# Patient Record
Sex: FEMALE | Race: AMERICAN INDIAN OR ALASKA NATIVE | NOT HISPANIC OR LATINO | Employment: FULL TIME | URBAN - METROPOLITAN AREA
[De-identification: names, ages, dates, MRNs, and addresses within clinical notes are randomized per-mention and may not be internally consistent; named-entity substitution may affect disease eponyms.]

---

## 2017-03-27 ENCOUNTER — TRANSCRIBE ORDERS (OUTPATIENT)
Dept: LAB | Facility: OTHER | Age: 62
End: 2017-03-27

## 2017-03-27 ENCOUNTER — APPOINTMENT (OUTPATIENT)
Dept: LAB | Facility: OTHER | Age: 62
End: 2017-03-27
Payer: COMMERCIAL

## 2017-03-27 DIAGNOSIS — E78.5 HYPERLIPIDEMIA, UNSPECIFIED HYPERLIPIDEMIA TYPE: Primary | ICD-10-CM

## 2017-03-27 DIAGNOSIS — E78.5 HYPERLIPIDEMIA, UNSPECIFIED HYPERLIPIDEMIA TYPE: ICD-10-CM

## 2017-03-27 DIAGNOSIS — E55.9 UNSPECIFIED VITAMIN D DEFICIENCY: ICD-10-CM

## 2017-03-27 LAB
25(OH)D3 SERPL-MCNC: 13.1 NG/ML (ref 30–100)
ALBUMIN SERPL BCP-MCNC: 3.8 G/DL (ref 3.5–5)
ALP SERPL-CCNC: 116 U/L (ref 46–116)
ALT SERPL W P-5'-P-CCNC: 17 U/L (ref 12–78)
ANION GAP SERPL CALCULATED.3IONS-SCNC: 8 MMOL/L (ref 4–13)
AST SERPL W P-5'-P-CCNC: 9 U/L (ref 5–45)
BILIRUB SERPL-MCNC: 0.84 MG/DL (ref 0.2–1)
BUN SERPL-MCNC: 9 MG/DL (ref 5–25)
CALCIUM SERPL-MCNC: 9 MG/DL (ref 8.3–10.1)
CHLORIDE SERPL-SCNC: 103 MMOL/L (ref 100–108)
CHOLEST SERPL-MCNC: 208 MG/DL (ref 50–200)
CO2 SERPL-SCNC: 31 MMOL/L (ref 21–32)
CREAT SERPL-MCNC: 0.79 MG/DL (ref 0.6–1.3)
GFR SERPL CREATININE-BSD FRML MDRD: >60 ML/MIN/1.73SQ M
GLUCOSE P FAST SERPL-MCNC: 78 MG/DL (ref 65–99)
HDLC SERPL-MCNC: 85 MG/DL (ref 40–60)
LDLC SERPL CALC-MCNC: 107 MG/DL (ref 0–100)
POTASSIUM SERPL-SCNC: 3.2 MMOL/L (ref 3.5–5.3)
PROT SERPL-MCNC: 7.7 G/DL (ref 6.4–8.2)
SODIUM SERPL-SCNC: 142 MMOL/L (ref 136–145)
TRIGL SERPL-MCNC: 79 MG/DL

## 2017-03-27 PROCEDURE — 36415 COLL VENOUS BLD VENIPUNCTURE: CPT

## 2017-03-27 PROCEDURE — 80053 COMPREHEN METABOLIC PANEL: CPT

## 2017-03-27 PROCEDURE — 80061 LIPID PANEL: CPT

## 2017-03-27 PROCEDURE — 82306 VITAMIN D 25 HYDROXY: CPT

## 2017-06-01 ENCOUNTER — HOSPITAL ENCOUNTER (EMERGENCY)
Facility: HOSPITAL | Age: 62
Discharge: HOME | End: 2017-06-01
Attending: EMERGENCY MEDICINE
Payer: COMMERCIAL

## 2017-06-01 VITALS
HEIGHT: 63 IN | RESPIRATION RATE: 17 BRPM | DIASTOLIC BLOOD PRESSURE: 79 MMHG | TEMPERATURE: 98 F | HEART RATE: 71 BPM | BODY MASS INDEX: 33.28 KG/M2 | OXYGEN SATURATION: 97 % | WEIGHT: 187.83 LBS | SYSTOLIC BLOOD PRESSURE: 163 MMHG

## 2017-06-01 DIAGNOSIS — R59.0 ANTERIOR CERVICAL ADENOPATHY: ICD-10-CM

## 2017-06-01 DIAGNOSIS — R42 VERTIGO: ICD-10-CM

## 2017-06-01 DIAGNOSIS — H92.09 EAR PAIN: Primary | ICD-10-CM

## 2017-06-01 LAB
ALBUMIN SERPL BCP-MCNC: 3.6 G/DL (ref 3.5–5)
ALP SERPL-CCNC: 132 U/L (ref 46–116)
ALT SERPL W P-5'-P-CCNC: 21 U/L (ref 12–78)
ANION GAP SERPL CALCULATED.3IONS-SCNC: 9 MMOL/L (ref 4–13)
AST SERPL W P-5'-P-CCNC: 13 U/L (ref 5–45)
BASOPHILS # BLD AUTO: 0.05 THOUSANDS/ΜL (ref 0–0.1)
BASOPHILS NFR BLD AUTO: 1 % (ref 0–1)
BILIRUB SERPL-MCNC: 0.5 MG/DL (ref 0.2–1)
BUN SERPL-MCNC: 17 MG/DL (ref 5–25)
CALCIUM SERPL-MCNC: 9.3 MG/DL (ref 8.3–10.1)
CHLORIDE SERPL-SCNC: 101 MMOL/L (ref 100–108)
CO2 SERPL-SCNC: 28 MMOL/L (ref 21–32)
CREAT SERPL-MCNC: 0.97 MG/DL (ref 0.6–1.3)
EOSINOPHIL # BLD AUTO: 0.12 THOUSAND/ΜL (ref 0–0.61)
EOSINOPHIL NFR BLD AUTO: 1 % (ref 0–6)
ERYTHROCYTE [DISTWIDTH] IN BLOOD BY AUTOMATED COUNT: 11.9 % (ref 11.6–15.1)
GFR SERPL CREATININE-BSD FRML MDRD: >60 ML/MIN/1.73SQ M
GLUCOSE SERPL-MCNC: 136 MG/DL (ref 65–140)
HCT VFR BLD AUTO: 43.9 % (ref 34.8–46.1)
HGB BLD-MCNC: 14.9 G/DL (ref 11.5–15.4)
LYMPHOCYTES # BLD AUTO: 1.72 THOUSANDS/ΜL (ref 0.6–4.47)
LYMPHOCYTES NFR BLD AUTO: 16 % (ref 14–44)
MCH RBC QN AUTO: 33 PG (ref 26.8–34.3)
MCHC RBC AUTO-ENTMCNC: 33.9 G/DL (ref 31.4–37.4)
MCV RBC AUTO: 97 FL (ref 82–98)
MONOCYTES # BLD AUTO: 0.43 THOUSAND/ΜL (ref 0.17–1.22)
MONOCYTES NFR BLD AUTO: 4 % (ref 4–12)
NEUTROPHILS # BLD AUTO: 8.72 THOUSANDS/ΜL (ref 1.85–7.62)
NEUTS SEG NFR BLD AUTO: 79 % (ref 43–75)
NRBC BLD AUTO-RTO: 0 /100 WBCS
PLATELET # BLD AUTO: 298 THOUSANDS/UL (ref 149–390)
PMV BLD AUTO: 9.9 FL (ref 8.9–12.7)
POTASSIUM SERPL-SCNC: 3.2 MMOL/L (ref 3.5–5.3)
PROT SERPL-MCNC: 7.9 G/DL (ref 6.4–8.2)
RBC # BLD AUTO: 4.52 MILLION/UL (ref 3.81–5.12)
S PYO AG THROAT QL: NEGATIVE
SODIUM SERPL-SCNC: 138 MMOL/L (ref 136–145)
WBC # BLD AUTO: 11.06 THOUSAND/UL (ref 4.31–10.16)

## 2017-06-01 PROCEDURE — 36415 COLL VENOUS BLD VENIPUNCTURE: CPT | Performed by: EMERGENCY MEDICINE

## 2017-06-01 PROCEDURE — 87070 CULTURE OTHR SPECIMN AEROBIC: CPT | Performed by: EMERGENCY MEDICINE

## 2017-06-01 PROCEDURE — 99283 EMERGENCY DEPT VISIT LOW MDM: CPT

## 2017-06-01 PROCEDURE — 96360 HYDRATION IV INFUSION INIT: CPT

## 2017-06-01 PROCEDURE — 85025 COMPLETE CBC W/AUTO DIFF WBC: CPT | Performed by: EMERGENCY MEDICINE

## 2017-06-01 PROCEDURE — 80053 COMPREHEN METABOLIC PANEL: CPT | Performed by: EMERGENCY MEDICINE

## 2017-06-01 PROCEDURE — 87430 STREP A AG IA: CPT | Performed by: EMERGENCY MEDICINE

## 2017-06-01 RX ORDER — POTASSIUM CHLORIDE 20 MEQ/1
40 TABLET, EXTENDED RELEASE ORAL ONCE
Status: COMPLETED | OUTPATIENT
Start: 2017-06-01 | End: 2017-06-01

## 2017-06-01 RX ORDER — AMOXICILLIN 500 MG/1
500 CAPSULE ORAL 3 TIMES DAILY
Qty: 30 CAPSULE | Refills: 0 | Status: SHIPPED | OUTPATIENT
Start: 2017-06-01 | End: 2017-06-11

## 2017-06-01 RX ADMIN — POTASSIUM CHLORIDE 40 MEQ: 1500 TABLET, EXTENDED RELEASE ORAL at 04:17

## 2017-06-01 RX ADMIN — SODIUM CHLORIDE 1000 ML: 0.9 INJECTION, SOLUTION INTRAVENOUS at 03:12

## 2017-06-03 LAB — BACTERIA THROAT CULT: NORMAL

## 2017-08-08 ENCOUNTER — TRANSCRIBE ORDERS (OUTPATIENT)
Dept: LAB | Facility: OTHER | Age: 62
End: 2017-08-08

## 2017-08-08 ENCOUNTER — APPOINTMENT (OUTPATIENT)
Dept: LAB | Facility: OTHER | Age: 62
End: 2017-08-08
Payer: COMMERCIAL

## 2017-08-08 DIAGNOSIS — Z00.8 HEALTH EXAMINATION IN POPULATION SURVEY: Primary | ICD-10-CM

## 2017-08-08 LAB
CHOLEST SERPL-MCNC: 190 MG/DL (ref 50–200)
EST. AVERAGE GLUCOSE BLD GHB EST-MCNC: 100 MG/DL
HBA1C MFR BLD: 5.1 % (ref 4.2–6.3)
HDLC SERPL-MCNC: 81 MG/DL (ref 40–60)
LDLC SERPL CALC-MCNC: 98 MG/DL (ref 0–100)
TRIGL SERPL-MCNC: 56 MG/DL

## 2017-08-08 PROCEDURE — 80061 LIPID PANEL: CPT

## 2017-08-08 PROCEDURE — 83036 HEMOGLOBIN GLYCOSYLATED A1C: CPT

## 2017-08-08 PROCEDURE — 36415 COLL VENOUS BLD VENIPUNCTURE: CPT

## 2017-12-27 ENCOUNTER — TRANSCRIBE ORDERS (OUTPATIENT)
Dept: LAB | Facility: OTHER | Age: 62
End: 2017-12-27

## 2017-12-27 ENCOUNTER — OFFICE VISIT (OUTPATIENT)
Dept: URGENT CARE | Facility: MEDICAL CENTER | Age: 62
End: 2017-12-27
Payer: COMMERCIAL

## 2017-12-27 ENCOUNTER — APPOINTMENT (OUTPATIENT)
Dept: LAB | Facility: OTHER | Age: 62
End: 2017-12-27
Payer: COMMERCIAL

## 2017-12-27 DIAGNOSIS — E87.6 HYPOPOTASSEMIA: Primary | ICD-10-CM

## 2017-12-27 LAB
ANION GAP SERPL CALCULATED.3IONS-SCNC: 6 MMOL/L (ref 4–13)
BUN SERPL-MCNC: 9 MG/DL (ref 5–25)
CALCIUM SERPL-MCNC: 9.2 MG/DL (ref 8.3–10.1)
CHLORIDE SERPL-SCNC: 106 MMOL/L (ref 100–108)
CO2 SERPL-SCNC: 29 MMOL/L (ref 21–32)
CREAT SERPL-MCNC: 0.8 MG/DL (ref 0.6–1.3)
GFR SERPL CREATININE-BSD FRML MDRD: 93 ML/MIN/1.73SQ M
GLUCOSE SERPL-MCNC: 78 MG/DL (ref 65–140)
POTASSIUM SERPL-SCNC: 3.3 MMOL/L (ref 3.5–5.3)
S PYO AG THROAT QL: NEGATIVE
SODIUM SERPL-SCNC: 141 MMOL/L (ref 136–145)

## 2017-12-27 PROCEDURE — 80048 BASIC METABOLIC PNL TOTAL CA: CPT

## 2017-12-27 PROCEDURE — 94640 AIRWAY INHALATION TREATMENT: CPT

## 2017-12-27 PROCEDURE — S9088 SERVICES PROVIDED IN URGENT: HCPCS

## 2017-12-27 PROCEDURE — 99213 OFFICE O/P EST LOW 20 MIN: CPT

## 2017-12-27 PROCEDURE — 36415 COLL VENOUS BLD VENIPUNCTURE: CPT

## 2018-01-01 NOTE — PROGRESS NOTES
Assessment   1  Acute bronchitis (466 0) (J20 9)   2  Laryngitis (464 00) (J04 0)    Plan   Cough    · Ipratropium-Albuterol 0 5-2 5 (3) MG/3ML Inhalation Solution   · Rapid StrepA- POC; Source:Throat; Status:Resulted - Requires Verification,Retrospective    Authorization;   Done: [id number] 10:24AM  Laryngitis    · Albuterol Sulfate (2 5 MG/3ML) 0 083% Inhalation Nebulization Solution; USE 1    UNIT DOSE EVERY 4-6 HOURS AS NEEDED FOR WHEEZING    · Benzonatate 100 MG Oral Capsule; TAKE 1 CAPSULE 2-3 TIMES DAILY AS    DIRECTED    Discussion/Summary   Discussion Summary:    Viral upper respiratory illness  Rapid strep negative in office, will call with culture results  with supportive therapy at home- Tylenol or ibuprofen for fever, salt water gargles for sore throat  Use tessalon and inhaler for cough  fluids to stay hydrated  to your PCP for continued symptoms or persistent high fevers  to the ER for any respiratory distress  Understands and agrees with treatment plan: The treatment plan was reviewed with the patient/guardian  The patient/guardian understands and agrees with the treatment plan      Chief Complaint   1  Cough   2  Sore Throat  Chief Complaint Free Text Note Form: Pt states on sunday she started losing her voice  Went home early from work yesterday  Pt c/o feeling hot and hold but reports no fevers  Pt states she has a mild sore throat and a cough  History of Present Illness   HPI: This is a 46-year-old female presenting for sore throat, cough, laryngitis times 5 days  She states that she works at [employer] and has been seeing many patients with similar symptoms  she does have a history of asthma  She reports chills but no fever, productive cough of phlegm, denies shortness of breath or difficulty breathing  She has not been using an inhaler at home  Hospital Based Practices Required Assessment:      Pain Assessment      the patient states they do not have pain        Abuse And Domestic Violence Screen       Yes, the patient is safe at home  -- The patient states no one is hurting them  Depression And Suicide Screen  No, the patient has not had thoughts of hurting themself  No, the patient has not felt depressed in the past 7 days  Primary Language is  english  Readiness To Learn: Receptive  Barriers To Learning: none  Preferred Learning: verbal      Education Completed: disease/condition-- and-- treatment/procedure      Teaching Method: verbal      Person Taught: patient      Evaluation Of Learning: verbalized/demonstrated understanding      Review of Systems   Focused-Female:      Constitutional: feeling poorly,-- chills-- and-- feeling tired, but-- as noted in HPI-- and-- no fever  ENT: sore throat-- and-- hoarseness, but-- no earache,-- no nosebleeds,-- no hearing loss-- and-- no nasal discharge  Cardiovascular: no complaints of slow or fast heart rate, no chest pain, no palpitations, no leg claudication or lower extremity edema  Respiratory: cough,-- wheezing-- and-- PND, but-- no shortness of breath,-- no orthopnea-- and-- no shortness of breath during exertion  Gastrointestinal: no complaints of abdominal pain, no constipation, no nausea or diarrhea, no vomiting, no bloody stools  Active Problems   1  Hypertension (401 9) (I10)    Family History   Mother    1  Family history of diabetes mellitus (V18 0) (Z83 3)    Social History    · Never a smoker   · None    Surgical History   1  History of Gastric Surgery For Morbid Obesity    Current Meds    1  Lisinopril TABS; Therapy: (Recorded:[id number]) to Recorded    Allergies   1   Aspir-81 TBEC    Vitals   Signs   Recorded: [id number] 09:48AM   Temperature: 97 7 F  Heart Rate: 67  Respiration: 16  Systolic: 922  Diastolic: 88  Height: 5 ft 3 in  Weight: 190 lb   BMI Calculated: 33 66  BSA Calculated: 1 89  O2 Saturation: 100  Pain Scale: 0    Physical Exam        Constitutional General appearance: Abnormal  -- well-appearing well-nourished in no apparent distress  The patient talks in a whisper due to losing her voice  Eyes      Conjunctiva and lids: No swelling, erythema or discharge  Pupils and irises: Equal, round and reactive to light  Ears, Nose, Mouth, and Throat      External inspection of ears and nose: Normal        Otoscopic examination: Tympanic membranes translucent with normal light reflex  Canals patent without erythema  Nasal mucosa, septum, and turbinates: Normal without edema or erythema  Oropharynx: Abnormal  -- Erythematous tonsils without edema exudates seen  Pulmonary      Respiratory effort: No increased work of breathing or signs of respiratory distress  Auscultation of lungs: Abnormal  -- Wheezes heard on right side, left side clear  Poor air movement  After nebulizer treatment: Air movement is improved, no adventitious sounds heard in the lungs, patient feels subjectively better  Cardiovascular      Auscultation of heart: Normal rate and rhythm, normal S1 and S2, without murmurs  Lymphatic      Palpation of lymph nodes in neck: Abnormal  -- Tenderness to palpation of lymph nodes cervical area  Psychiatric      Orientation to person, place, and time: Normal        Mood and affect: Normal        Results/Data   Rapid StrepA- POC [id number] 10:24AM Carlito Perez      Test Name Result Flag Reference   Rapid Strep Negative                      Procedure        Treatment #1 included Albuterol 2 5 mg and Ipratropium 0 5 mg  After treatment #1, the patient noted symptomatic improvement  Lung sounds were clear  Air exchange was improved  No wheezes were appreciated  No rhonchi were appreciated  No rales were appreciated  Message   Return to work or school:    Keri Jorgensen is under my professional care  She was seen in my office on 12/27/2017     She is not able to return to work until no fevers for 24 hours  Sivakumar Lugo PA-C        Signatures    Electronically signed by : DAVIS Virk; Dec 27 2017 12:14PM EST                       (Author)     Electronically signed by : ARISTEO Parker ; Dec 31 2017 12:00PM EST                       (Co-author)

## 2018-01-23 VITALS
WEIGHT: 190 LBS | OXYGEN SATURATION: 100 % | HEIGHT: 63 IN | RESPIRATION RATE: 16 BRPM | TEMPERATURE: 97.7 F | HEART RATE: 67 BPM | SYSTOLIC BLOOD PRESSURE: 134 MMHG | DIASTOLIC BLOOD PRESSURE: 88 MMHG | BODY MASS INDEX: 33.66 KG/M2

## 2018-01-24 NOTE — MISCELLANEOUS
Message  Return to work or school:   Spring Valle is under my professional care  She was seen in my office on 12/27/2017    She is not able to return to work until no fevers for 24 hours  Mariano Mccormack PA-C        Signatures   Electronically signed by : DAVIS Thomas; Dec 27 2017 12:14PM EST                       (Author)

## 2018-10-01 ENCOUNTER — APPOINTMENT (OUTPATIENT)
Dept: RADIOLOGY | Facility: CLINIC | Age: 63
End: 2018-10-01

## 2018-10-01 ENCOUNTER — TRANSCRIBE ORDERS (OUTPATIENT)
Dept: ADMINISTRATIVE | Facility: HOSPITAL | Age: 63
End: 2018-10-01

## 2018-10-01 DIAGNOSIS — Z22.7 INACTIVE TUBERCULOSIS: Primary | ICD-10-CM

## 2018-10-01 PROCEDURE — 71046 X-RAY EXAM CHEST 2 VIEWS: CPT

## 2018-11-02 ENCOUNTER — OFFICE VISIT (OUTPATIENT)
Dept: URGENT CARE | Facility: CLINIC | Age: 63
End: 2018-11-02
Payer: COMMERCIAL

## 2018-11-02 VITALS
SYSTOLIC BLOOD PRESSURE: 132 MMHG | WEIGHT: 187 LBS | OXYGEN SATURATION: 96 % | HEART RATE: 74 BPM | RESPIRATION RATE: 16 BRPM | DIASTOLIC BLOOD PRESSURE: 88 MMHG | BODY MASS INDEX: 31.92 KG/M2 | TEMPERATURE: 97.9 F | HEIGHT: 64 IN

## 2018-11-02 DIAGNOSIS — R21 RASH: Primary | ICD-10-CM

## 2018-11-02 PROCEDURE — 99213 OFFICE O/P EST LOW 20 MIN: CPT | Performed by: PHYSICIAN ASSISTANT

## 2018-11-02 RX ORDER — VALSARTAN AND HYDROCHLOROTHIAZIDE 80; 12.5 MG/1; MG/1
1 TABLET, FILM COATED ORAL
COMMUNITY
Start: 2018-02-07 | End: 2019-10-21

## 2018-11-02 RX ORDER — OMEPRAZOLE 20 MG/1
20 CAPSULE, DELAYED RELEASE ORAL
COMMUNITY
Start: 2018-02-07 | End: 2019-10-21

## 2018-11-02 NOTE — PATIENT INSTRUCTIONS
Patient Instructions        Can use hydrocortisone on her rash  This is not shingles as it is bilateral   Likely not allergic reaction to the flu shot due to distribution and no local reaction  Follow up with PCP in 3-5 days  Proceed to  ER if symptoms worsen

## 2018-11-02 NOTE — PROGRESS NOTES
[facility]        NAME: Ashley Garcia is a 66 y o  female  : 1955    MRN: [id number]  DATE: 2018  TIME: 11:08 AM    Assessment and Plan   Rash [R21]  1  Rash  hydrocortisone 2 5 % cream         Patient Instructions        Can use hydrocortisone on her rash  This is not shingles as it is bilateral   Likely not allergic reaction to the flu shot due to distribution and no local reaction  Follow up with PCP in 3-5 days  Proceed to  ER if symptoms worsen  Chief Complaint     Chief Complaint   Patient presents with    Rash     small pimple like areas on outter lower back x 3 day, received flu shot tuesday and rash appeared that night         History of Present Illness        73-year-old female complains itchy rash across her lower back several days  She got a flu shot in the next day she got this rash  She has no pain at the site of the injection  She is not treated the rash with anything  It is a little bit burning when she scratches it  No fever or chills  No trouble swallowing or breathing  No new exposures          Review of Systems   Review of Systems      Current Medications       Current Outpatient Prescriptions:     Ca Phosphate-Cholecalciferol 115-2000 MG-UNIT TABS, Take 2,000 Units by mouth, Disp: , Rfl:     omeprazole (PriLOSEC) 20 mg delayed release capsule, Take 20 mg by mouth, Disp: , Rfl:     valsartan-hydrochlorothiazide (DIOVAN-HCT) 80-12 5 MG per tablet, Take 1 tablet by mouth, Disp: , Rfl:     hydrocortisone 2 5 % cream, Apply topically 3 (three) times a day, Disp: 30 g, Rfl: 0    Current Allergies     Allergies as of 2018 - Reviewed 2018   Allergen Reaction Noted    Aspirin Diarrhea 2016            The following portions of the patient's history were reviewed and updated as appropriate: allergies, current medications, past family history, past medical history, past social history, past surgical history and problem list      Past Medical History:   Diagnosis Date    Hypertension        Past Surgical History:   Procedure Laterality Date    GASTROSTOMY         Family History   Problem Relation Age of Onset    Hypertension Mother     Diabetes Mother     No Known Problems Father          Medications have been verified  Objective   /88 (BP Location: Left arm, Patient Position: Sitting, Cuff Size: Standard)   Pulse 74   Temp 97 9 °F (36 6 °C) (Tympanic)   Resp 16   Ht 5' 4" (1 626 m)   Wt 84 8 kg (187 lb)   SpO2 96%   BMI 32 10 kg/m²        Physical Exam     Physical Exam   Constitutional: She appears well-developed and well-nourished  Cardiovascular: Normal rate and regular rhythm  Pulmonary/Chest: Effort normal and breath sounds normal    Skin:     Left deltoid injection site no erythema or swelling or tenderness  12-15 scattered red base vesicles and papules across the bilateral lower back lumbar region  These are nontender  No drainage is expressed

## 2019-02-09 ENCOUNTER — APPOINTMENT (OUTPATIENT)
Dept: RADIOLOGY | Facility: CLINIC | Age: 64
End: 2019-02-09

## 2019-02-09 ENCOUNTER — APPOINTMENT (OUTPATIENT)
Dept: URGENT CARE | Facility: CLINIC | Age: 64
End: 2019-02-09

## 2019-02-09 DIAGNOSIS — M25.552 LEFT HIP PAIN: Primary | ICD-10-CM

## 2019-02-09 DIAGNOSIS — M25.552 LEFT HIP PAIN: ICD-10-CM

## 2019-02-09 PROCEDURE — 73502 X-RAY EXAM HIP UNI 2-3 VIEWS: CPT

## 2019-02-09 PROCEDURE — 99213 OFFICE O/P EST LOW 20 MIN: CPT | Performed by: PHYSICIAN ASSISTANT

## 2019-09-03 ENCOUNTER — TRANSCRIBE ORDERS (OUTPATIENT)
Dept: LAB | Facility: HOSPITAL | Age: 64
End: 2019-09-03

## 2019-09-03 ENCOUNTER — APPOINTMENT (OUTPATIENT)
Dept: LAB | Facility: HOSPITAL | Age: 64
End: 2019-09-03

## 2019-09-03 DIAGNOSIS — Z00.8 HEALTH EXAMINATION IN POPULATION SURVEY: ICD-10-CM

## 2019-09-03 DIAGNOSIS — Z00.8 HEALTH EXAMINATION IN POPULATION SURVEY: Primary | ICD-10-CM

## 2019-09-03 LAB
CHOLEST SERPL-MCNC: 219 MG/DL (ref 50–200)
EST. AVERAGE GLUCOSE BLD GHB EST-MCNC: 94 MG/DL
HBA1C MFR BLD: 4.9 % (ref 4.2–6.3)
HDLC SERPL-MCNC: 76 MG/DL (ref 40–60)
LDLC SERPL CALC-MCNC: 119 MG/DL (ref 0–100)
NONHDLC SERPL-MCNC: 143 MG/DL
TRIGL SERPL-MCNC: 119 MG/DL

## 2019-09-03 PROCEDURE — 80061 LIPID PANEL: CPT

## 2019-09-03 PROCEDURE — 83036 HEMOGLOBIN GLYCOSYLATED A1C: CPT

## 2019-09-03 PROCEDURE — 36415 COLL VENOUS BLD VENIPUNCTURE: CPT

## 2019-09-27 ENCOUNTER — OFFICE VISIT (OUTPATIENT)
Dept: URGENT CARE | Facility: CLINIC | Age: 64
End: 2019-09-27
Payer: COMMERCIAL

## 2019-09-27 VITALS
DIASTOLIC BLOOD PRESSURE: 85 MMHG | BODY MASS INDEX: 35.48 KG/M2 | TEMPERATURE: 98 F | HEIGHT: 62 IN | HEART RATE: 70 BPM | SYSTOLIC BLOOD PRESSURE: 180 MMHG | OXYGEN SATURATION: 98 % | RESPIRATION RATE: 18 BRPM | WEIGHT: 192.8 LBS

## 2019-09-27 DIAGNOSIS — H65.02 NON-RECURRENT ACUTE SEROUS OTITIS MEDIA OF LEFT EAR: Primary | ICD-10-CM

## 2019-09-27 PROCEDURE — G0382 LEV 3 HOSP TYPE B ED VISIT: HCPCS | Performed by: PHYSICIAN ASSISTANT

## 2019-09-27 PROCEDURE — S9083 URGENT CARE CENTER GLOBAL: HCPCS | Performed by: PHYSICIAN ASSISTANT

## 2019-09-27 RX ORDER — PREDNISONE 50 MG/1
50 TABLET ORAL DAILY
Qty: 5 TABLET | Refills: 0 | Status: SHIPPED | OUTPATIENT
Start: 2019-09-27 | End: 2019-10-02

## 2019-09-27 RX ORDER — CETIRIZINE HYDROCHLORIDE 10 MG/1
10 TABLET ORAL DAILY
Qty: 30 TABLET | Refills: 0 | Status: SHIPPED | OUTPATIENT
Start: 2019-09-27 | End: 2019-10-21

## 2019-09-27 NOTE — PATIENT INSTRUCTIONS
Take prednisone as directed until completed  Take Zyrtec as directed at night  Motrin and/or Tylenol as needed for fevers aches and pains  Make sure you take rib daily medications as directed  Follow up with PCP in 3-5 days  Proceed to  ER if symptoms worsen  Serous Otitis Media   WHAT YOU NEED TO KNOW:   Serous otitis media is fluid trapped behind your tympanic membrane (eardrum), without an ear infection  Your eardrum is in your middle ear  Serous otitis media is also called otitis media with effusion  You may have fluid in your ear for months, but it usually goes away on its own  The fluid may be in one or both ears  The fluid may cause muffled sounds, and you may feel like your ears are full  Serous otitis media may be caused by an upper respiratory infection or allergies  It is most common in the fall and early spring  DISCHARGE INSTRUCTIONS:   Return to the emergency department if:   · You have a fever  · You have a sudden loss of hearing in your affected ear  · You develop a severe headache and stiff neck  · You have a seizure  Contact your healthcare provider if:   · You have fluid draining from your ear  · You have new symptoms  · You have questions or concerns about your condition or care  Follow up with your healthcare provider as directed: Your ears will need to be checked regularly  You may need to see a specialist  Write down your questions so you remember to ask them during your visits  © 2017 [employer] Information is for End User's use only and may not be sold, redistributed or otherwise used for commercial purposes  All illustrations and images included in CareNotes® are the copyrighted property of A [employer] M [employer] Inc  or Steve Fontaine  The above information is an  only  It is not intended as medical advice for individual conditions or treatments   Talk to your doctor, nurse or pharmacist before following any medical regimen to see if it is safe and effective for you

## 2019-09-27 NOTE — PROGRESS NOTES
[facility]        NAME: Evens Nova is a 60 y o  female  : 1955    MRN: [id number]  DATE: 2019  TIME: 8:35 AM    Assessment and Plan   Non-recurrent acute serous otitis media of left ear [H65 02]  1  Non-recurrent acute serous otitis media of left ear  predniSONE 50 mg tablet    cetirizine (ZyrTEC) 10 mg tablet    Ambulatory referral to [facility]         Patient Instructions     Take prednisone as directed until completed  Take Zyrtec as directed at night  Motrin and/or Tylenol as needed for fevers aches and pains  Make sure you take rib daily medications as directed  Follow up with PCP in 3-5 days  Proceed to  ER if symptoms worsen  Chief Complaint     Chief Complaint   Patient presents with   Divya Sood     pt states that she has pain and ringing in her left ear, started a few days ago  History of Present Illness       28-year-old female presents with couple day history of ringing in left ear and started have some left ear discomfort  Denies any fevers or chills  Denies any runny nose sore throats  No chest pain shortness of breath  Denies any cough  Patient denies any abdominal pain nausea vomiting or diarrhea  No drainage from the ear  Denies any decrease in hearing reports pain more as a pressure  Patient reports she has not taking her blood pressure medications today  Earache    There is pain in the left ear  This is a new problem  The current episode started in the past 7 days  The problem occurs constantly  The problem has been waxing and waning  There has been no fever  The pain is mild  Pertinent negatives include no abdominal pain, coughing, headaches, hearing loss, rhinorrhea or sore throat  She has tried nothing for the symptoms  The treatment provided no relief  Review of Systems   Review of Systems   Constitutional: Negative  HENT: Positive for ear pain  Negative for hearing loss, rhinorrhea and sore throat  Eyes: Negative  Respiratory: Negative  Negative for cough  Cardiovascular: Negative  Gastrointestinal: Negative  Negative for abdominal pain  Musculoskeletal: Negative  Skin: Negative  Neurological: Negative  Negative for headaches  Current Medications       Current Outpatient Medications:     valsartan-hydrochlorothiazide (DIOVAN-HCT) 80-12 5 MG per tablet, Take 1 tablet by mouth, Disp: , Rfl:     Ca Phosphate-Cholecalciferol 115-2000 MG-UNIT TABS, Take 2,000 Units by mouth, Disp: , Rfl:     cetirizine (ZyrTEC) 10 mg tablet, Take 1 tablet (10 mg total) by mouth daily, Disp: 30 tablet, Rfl: 0    hydrocortisone 2 5 % cream, Apply topically 3 (three) times a day (Patient not taking: Reported on 9/27/2019), Disp: 30 g, Rfl: 0    omeprazole (PriLOSEC) 20 mg delayed release capsule, Take 20 mg by mouth, Disp: , Rfl:     predniSONE 50 mg tablet, Take 1 tablet (50 mg total) by mouth daily for 5 days, Disp: 5 tablet, Rfl: 0    Current Allergies     Allergies as of 09/27/2019 - Reviewed 09/27/2019   Allergen Reaction Noted    Aspirin Diarrhea 11/23/2016            The following portions of the patient's history were reviewed and updated as appropriate: allergies, current medications, past family history, past medical history, past social history, past surgical history and problem list      Past Medical History:   Diagnosis Date    Hypertension        Past Surgical History:   Procedure Laterality Date    GASTROSTOMY         Family History   Problem Relation Age of Onset    Hypertension Mother     Diabetes Mother     No Known Problems Father          Medications have been verified  Objective   BP (!) 180/85   Pulse 70   Temp 98 °F (36 7 °C) (Tympanic)   Resp 18   Ht 5' 2" (1 575 m)   Wt 87 5 kg (192 lb 12 8 oz)   SpO2 98%   BMI 35 26 kg/m²        Physical Exam     Physical Exam   Constitutional: She is oriented to person, place, and time  She appears well-developed and well-nourished   No distress  HENT:   Head: Normocephalic and atraumatic  Right Ear: Hearing, tympanic membrane and external ear normal    Left Ear: Hearing, external ear and ear canal normal  No lacerations  No drainage, swelling or tenderness  No foreign bodies  No mastoid tenderness  Tympanic membrane is not injected, not scarred, not perforated, not erythematous, not retracted and not bulging  Tympanic membrane mobility is normal  A middle ear effusion is present  No hemotympanum  No decreased hearing is noted  Nose: Nose normal    Mouth/Throat: Oropharynx is clear and moist  No oropharyngeal exudate  Eyes: Conjunctivae and EOM are normal  Right eye exhibits no discharge  Left eye exhibits no discharge  Neck: Normal range of motion  Neck supple  Cardiovascular: Normal rate, regular rhythm, normal heart sounds and intact distal pulses  No murmur heard  Pulmonary/Chest: Effort normal and breath sounds normal  No respiratory distress  She has no wheezes  She has no rales  Abdominal: Soft  Bowel sounds are normal  There is no tenderness  Musculoskeletal: Normal range of motion  Lymphadenopathy:     She has no cervical adenopathy  Neurological: She is alert and oriented to person, place, and time  Skin: Skin is warm and dry  Psychiatric: She has a normal mood and affect  Nursing note and vitals reviewed

## 2019-10-21 ENCOUNTER — APPOINTMENT (OUTPATIENT)
Dept: LAB | Facility: CLINIC | Age: 64
End: 2019-10-21
Payer: COMMERCIAL

## 2019-10-21 ENCOUNTER — OFFICE VISIT (OUTPATIENT)
Dept: FAMILY MEDICINE CLINIC | Facility: CLINIC | Age: 64
End: 2019-10-21
Payer: COMMERCIAL

## 2019-10-21 VITALS
SYSTOLIC BLOOD PRESSURE: 126 MMHG | WEIGHT: 189 LBS | OXYGEN SATURATION: 99 % | DIASTOLIC BLOOD PRESSURE: 90 MMHG | TEMPERATURE: 97.5 F | BODY MASS INDEX: 34.78 KG/M2 | HEIGHT: 62 IN | HEART RATE: 88 BPM

## 2019-10-21 DIAGNOSIS — Z12.39 SCREENING FOR BREAST CANCER: ICD-10-CM

## 2019-10-21 DIAGNOSIS — E04.1 NONTOXIC UNINODULAR GOITER: ICD-10-CM

## 2019-10-21 DIAGNOSIS — I10 ESSENTIAL HYPERTENSION: ICD-10-CM

## 2019-10-21 DIAGNOSIS — I10 ESSENTIAL HYPERTENSION: Primary | ICD-10-CM

## 2019-10-21 DIAGNOSIS — E78.2 MIXED HYPERLIPIDEMIA: ICD-10-CM

## 2019-10-21 PROBLEM — E87.6 HYPOKALEMIA: Status: RESOLVED | Noted: 2017-12-21 | Resolved: 2019-10-21

## 2019-10-21 PROBLEM — R11.10 VOMITING: Status: ACTIVE | Noted: 2017-12-21

## 2019-10-21 PROBLEM — E66.9 OBESITY (BMI 30-39.9): Status: ACTIVE | Noted: 2018-02-07

## 2019-10-21 PROBLEM — E78.5 HYPERLIPIDEMIA: Status: ACTIVE | Noted: 2018-02-07

## 2019-10-21 PROBLEM — R05.9 COUGH: Status: RESOLVED | Noted: 2017-12-27 | Resolved: 2019-10-21

## 2019-10-21 PROBLEM — J04.0 LARYNGITIS: Status: ACTIVE | Noted: 2017-12-27

## 2019-10-21 PROBLEM — E87.6 HYPOKALEMIA: Status: ACTIVE | Noted: 2017-12-21

## 2019-10-21 PROBLEM — J04.0 LARYNGITIS: Status: RESOLVED | Noted: 2017-12-27 | Resolved: 2019-10-21

## 2019-10-21 PROBLEM — J20.9 ACUTE BRONCHITIS: Status: ACTIVE | Noted: 2017-12-27

## 2019-10-21 PROBLEM — M25.559 HIP PAIN: Status: RESOLVED | Noted: 2019-10-21 | Resolved: 2019-10-21

## 2019-10-21 PROBLEM — R06.02 SOB (SHORTNESS OF BREATH) ON EXERTION: Status: RESOLVED | Noted: 2017-12-27 | Resolved: 2019-10-21

## 2019-10-21 PROBLEM — E55.9 VITAMIN D DEFICIENCY: Status: ACTIVE | Noted: 2018-02-07

## 2019-10-21 PROBLEM — R05.9 COUGH: Status: ACTIVE | Noted: 2017-12-27

## 2019-10-21 PROBLEM — R06.02 SOB (SHORTNESS OF BREATH) ON EXERTION: Status: ACTIVE | Noted: 2017-12-27

## 2019-10-21 PROBLEM — K21.9 CHRONIC GERD: Status: ACTIVE | Noted: 2018-02-07

## 2019-10-21 PROBLEM — J45.909 ASTHMA: Status: ACTIVE | Noted: 2018-02-07

## 2019-10-21 PROBLEM — M25.559 HIP PAIN: Status: ACTIVE | Noted: 2019-10-21

## 2019-10-21 PROBLEM — J20.9 ACUTE BRONCHITIS: Status: RESOLVED | Noted: 2017-12-27 | Resolved: 2019-10-21

## 2019-10-21 PROBLEM — R11.10 VOMITING: Status: RESOLVED | Noted: 2017-12-21 | Resolved: 2019-10-21

## 2019-10-21 PROBLEM — M70.60 TROCHANTERIC BURSITIS: Status: ACTIVE | Noted: 2019-10-21

## 2019-10-21 LAB
ALBUMIN SERPL BCP-MCNC: 3.6 G/DL (ref 3.5–5)
ALP SERPL-CCNC: 126 U/L (ref 46–116)
ALT SERPL W P-5'-P-CCNC: 17 U/L (ref 12–78)
ANION GAP SERPL CALCULATED.3IONS-SCNC: 9 MMOL/L (ref 4–13)
AST SERPL W P-5'-P-CCNC: 12 U/L (ref 5–45)
BILIRUB SERPL-MCNC: 0.63 MG/DL (ref 0.2–1)
BUN SERPL-MCNC: 13 MG/DL (ref 5–25)
CALCIUM SERPL-MCNC: 9.3 MG/DL (ref 8.3–10.1)
CHLORIDE SERPL-SCNC: 107 MMOL/L (ref 100–108)
CO2 SERPL-SCNC: 28 MMOL/L (ref 21–32)
CREAT SERPL-MCNC: 0.95 MG/DL (ref 0.6–1.3)
GFR SERPL CREATININE-BSD FRML MDRD: 74 ML/MIN/1.73SQ M
GLUCOSE P FAST SERPL-MCNC: 79 MG/DL (ref 65–99)
POTASSIUM SERPL-SCNC: 3.4 MMOL/L (ref 3.5–5.3)
PROT SERPL-MCNC: 7.8 G/DL (ref 6.4–8.2)
SODIUM SERPL-SCNC: 144 MMOL/L (ref 136–145)
TSH SERPL DL<=0.05 MIU/L-ACNC: 0.81 UIU/ML (ref 0.36–3.74)

## 2019-10-21 PROCEDURE — 84443 ASSAY THYROID STIM HORMONE: CPT

## 2019-10-21 PROCEDURE — 3008F BODY MASS INDEX DOCD: CPT | Performed by: FAMILY MEDICINE

## 2019-10-21 PROCEDURE — 36415 COLL VENOUS BLD VENIPUNCTURE: CPT

## 2019-10-21 PROCEDURE — 80053 COMPREHEN METABOLIC PANEL: CPT

## 2019-10-21 PROCEDURE — 99204 OFFICE O/P NEW MOD 45 MIN: CPT | Performed by: FAMILY MEDICINE

## 2019-10-21 RX ORDER — CYCLOBENZAPRINE HCL 10 MG
TABLET ORAL 3 TIMES DAILY
COMMUNITY
End: 2019-10-21

## 2019-10-21 RX ORDER — HYDROCODONE BITARTRATE AND ACETAMINOPHEN 5; 300 MG/1; MG/1
TABLET ORAL
COMMUNITY
End: 2019-10-21

## 2019-10-21 RX ORDER — NICOTINE POLACRILEX 4 MG/1
GUM, CHEWING ORAL
COMMUNITY
End: 2019-10-21

## 2019-10-21 RX ORDER — HYDROCHLOROTHIAZIDE 12.5 MG/1
25 CAPSULE, GELATIN COATED ORAL EVERY MORNING
Qty: 90 CAPSULE | Refills: 1 | Status: SHIPPED | OUTPATIENT
Start: 2019-10-21 | End: 2020-07-15 | Stop reason: SDUPTHER

## 2019-10-21 RX ORDER — BACLOFEN 10 MG/1
TABLET ORAL
COMMUNITY
End: 2019-10-21

## 2019-10-21 RX ORDER — MELOXICAM 15 MG/1
TABLET ORAL DAILY
COMMUNITY
End: 2019-10-21

## 2019-10-21 NOTE — PROGRESS NOTES
[address] [place] [address] 1955 female MRN: [id number]      ASSESSMENT/PLAN  Problem List Items Addressed This Visit        Endocrine    Nontoxic uninodular goiter    Relevant Orders    TSH, 3rd generation with Free T4 reflex       Cardiovascular and Mediastinum    Essential hypertension - Primary    Relevant Medications    hydrochlorothiazide (MICROZIDE) 12 5 mg capsule    Other Relevant Orders    Comprehensive metabolic panel       Other    Hyperlipidemia    BMI 34 0-34 9,adult      Other Visit Diagnoses     Screening for breast cancer        Relevant Orders    Mammo screening bilateral w 3d & cad        HTN: Within goal, check CMP   HLD: Discussed diet modifications, no medication required at this time (ASCVD 5%)   Hx of Goiter: Check TSH    HM:   Mammo script given   CRC screening UTD -- will request records from Dr Ian BOWDEN UTD (benign in 2018 with [facility])         Future Appointments   Date Time Provider Miguel Martinez   4/20/2020  8:20 AM DO JUVE [facility]          SUBJECTIVE  CC: No chief complaint on file  HPI:  [facility] is a 60 y o  female who presents to [facility] care  History reviewed and updated as below  HTN: ROS benign as below, needs a refill   HLD: Recent lipid panel Total 219, , HDL 76,    Goiter: Pt states she never had an US and is on no medication     Diet: Chicken, fish; lots of veggies    Exercise: Very active at work; walks at home     Review of Systems   Constitutional: Negative for unexpected weight change  HENT: Negative for congestion, ear pain, rhinorrhea and sore throat  Eyes: Negative for visual disturbance  Respiratory: Negative for cough and shortness of breath  Cardiovascular: Negative for chest pain, palpitations and leg swelling  Gastrointestinal: Negative for abdominal pain, constipation and diarrhea  Genitourinary: Negative for difficulty urinating and vaginal bleeding     Neurological: Negative for dizziness and headaches  Historical Information   The patient history was reviewed and updated as follows:    Past Medical History:   Diagnosis Date    Hypertension      Past Surgical History:   Procedure Laterality Date    LAPAROSCOPIC GASTRIC BANDING      UTERINE FIBROID SURGERY       Family History   Problem Relation Age of Onset    Hypertension Mother     Diabetes Mother     No Known Problems Father       Social History   Social History     Substance and Sexual Activity   Alcohol Use No     Social History     Substance and Sexual Activity   Drug Use No     Social History     Tobacco Use   Smoking Status Never Smoker   Smokeless Tobacco Never Used       Medications:     Current Outpatient Medications:     Ca Phosphate-Cholecalciferol 115-2000 MG-UNIT TABS, Take 2,000 Units by mouth, Disp: , Rfl:     hydrochlorothiazide (MICROZIDE) 12 5 mg capsule, Take 2 capsules (25 mg total) by mouth every morning, Disp: 90 capsule, Rfl: 1  Allergies   Allergen Reactions    Aspirin Diarrhea and Vomiting       OBJECTIVE    Vitals:   Vitals:    10/21/19 0810   BP: 126/90   Pulse: 88   Temp: 97 5 °F (36 4 °C)   SpO2: 99%   Weight: 85 7 kg (189 lb)   Height: 5' 2" (1 575 m)           Physical Exam   Constitutional: She appears well-developed and well-nourished  No distress  HENT:   Head: Normocephalic and atraumatic  Right Ear: External ear normal    Left Ear: External ear normal    Nose: Nose normal    Mouth/Throat: Oropharynx is clear and moist    Eyes: Conjunctivae are normal    Neck: No thyromegaly present  Cardiovascular: Normal rate and regular rhythm  Pulmonary/Chest: Effort normal and breath sounds normal  No respiratory distress  Abdominal: Soft  Bowel sounds are normal  She exhibits no distension  There is no tenderness  Musculoskeletal: She exhibits no edema  Lymphadenopathy:     She has no cervical adenopathy  Neurological: She is alert  Skin: Skin is warm and dry     Psychiatric: She has a normal mood and affect  Vitals reviewed  DO [facility]   10/21/2019  8:33 AM        BMI Counseling: Body mass index is 34 57 kg/m²  The BMI is above normal  Nutrition recommendations include 3-5 servings of fruits/vegetables daily, reducing fast food intake, consuming healthier snacks, decreasing soda and/or juice intake and moderation in carbohydrate intake  Exercise recommendations include exercising 3-5 times per week

## 2019-11-29 ENCOUNTER — TELEPHONE (OUTPATIENT)
Dept: FAMILY MEDICINE CLINIC | Facility: CLINIC | Age: 64
End: 2019-11-29

## 2019-11-29 NOTE — TELEPHONE ENCOUNTER
Please call pt and remind her to schedule mammogram      Also, pt had a colonoscopy with Dr Mary Jo Story -- can we request those records  Thanks!

## 2020-01-06 ENCOUNTER — OFFICE VISIT (OUTPATIENT)
Dept: URGENT CARE | Facility: CLINIC | Age: 65
End: 2020-01-06
Payer: COMMERCIAL

## 2020-01-06 VITALS
RESPIRATION RATE: 18 BRPM | SYSTOLIC BLOOD PRESSURE: 155 MMHG | WEIGHT: 192.6 LBS | DIASTOLIC BLOOD PRESSURE: 74 MMHG | BODY MASS INDEX: 35.23 KG/M2 | HEART RATE: 76 BPM | OXYGEN SATURATION: 95 % | TEMPERATURE: 98.2 F

## 2020-01-06 DIAGNOSIS — S29.012A MUSCLE STRAIN OF RIGHT UPPER BACK, INITIAL ENCOUNTER: Primary | ICD-10-CM

## 2020-01-06 PROCEDURE — G0382 LEV 3 HOSP TYPE B ED VISIT: HCPCS | Performed by: PHYSICIAN ASSISTANT

## 2020-01-06 PROCEDURE — S9083 URGENT CARE CENTER GLOBAL: HCPCS | Performed by: PHYSICIAN ASSISTANT

## 2020-01-06 RX ORDER — MELOXICAM 7.5 MG/1
7.5 TABLET ORAL DAILY
Qty: 30 TABLET | Refills: 0 | Status: SHIPPED | OUTPATIENT
Start: 2020-01-06 | End: 2020-09-04 | Stop reason: ALTCHOICE

## 2020-01-06 NOTE — PATIENT INSTRUCTIONS
Shoulder has full range of motion  No signs of rotator cuff injury  She has periscapular upper back pain  Would benefit from physical therapy and anti-inflammatories  Take Mobic with food

## 2020-01-06 NOTE — PROGRESS NOTES
[facility] [employer] [facility]        NAME: Monse Lacey is a 60 y o  female  : 1955    MRN: [id number]  DATE: 2020  TIME: 10:11 AM    Assessment and Plan   Muscle strain of right upper back, initial encounter [S29 012A]  1  Muscle strain of right upper back, initial encounter  meloxicam (MOBIC) 7 5 mg tablet    Ambulatory referral to Physical Therapy         Patient Instructions     Patient Instructions    Shoulder has full range of motion  No signs of rotator cuff injury  She has periscapular upper back pain  Would benefit from physical therapy and anti-inflammatories  Take Mobic with food  Follow up with PCP in 3-5 days  Proceed to  ER if symptoms worsen  Chief Complaint     Chief Complaint   Patient presents with    Shoulder Pain     c/o of right shoulder pain for a week  History of Present Illness         A 43-year-old right-hand-dominant female complains of right shoulder pain for last week  No fall or injury  She was moving her shoulder at work  She had no pain  The next day she had some pain when she woke up  No numbness or tingling in the arm  No neck pain  No headache  No pain down the arm  No weakness  Review of Systems   Review of Systems   Constitutional: Negative  HENT: Negative  Respiratory: Negative  Musculoskeletal: Positive for arthralgias           Current Medications       Current Outpatient Medications:     Ca Phosphate-Cholecalciferol 115-2000 MG-UNIT TABS, Take 2,000 Units by mouth, Disp: , Rfl:     hydrochlorothiazide (MICROZIDE) 12 5 mg capsule, Take 2 capsules (25 mg total) by mouth every morning, Disp: 90 capsule, Rfl: 1    meloxicam (MOBIC) 7 5 mg tablet, Take 1 tablet (7 5 mg total) by mouth daily, Disp: 30 tablet, Rfl: 0    Current Allergies     Allergies as of 2020 - Reviewed 2020   Allergen Reaction Noted    Aspirin Diarrhea and Vomiting 2016            The following portions of the patient's history were reviewed and updated as appropriate: allergies, current medications, past family history, past medical history, past social history, past surgical history and problem list      Past Medical History:   Diagnosis Date    Hypertension        Past Surgical History:   Procedure Laterality Date    LAPAROSCOPIC GASTRIC BANDING      UTERINE FIBROID SURGERY         Family History   Problem Relation Age of Onset    Hypertension Mother     Diabetes Mother     No Known Problems Father          Medications have been verified  Objective   /74   Pulse 76   Temp 98 2 °F (36 8 °C) (Temporal)   Resp 18   Wt 87 4 kg (192 lb 9 6 oz)   SpO2 95%   BMI 35 23 kg/m²        Physical Exam     Physical Exam   Constitutional: She is oriented to person, place, and time  She appears well-developed and well-nourished  No distress  Musculoskeletal:     Right shoulder nontender full range of motion  She has pain with internal rotation at 90 of abduction  She has tenderness and pain along the right medial scapular border  Mild muscle spasm there  Negative supraspinatus  Negative drop arm  Negative injury  Able to resist me in abduction  Neurological: She is alert and oriented to person, place, and time

## 2020-02-03 ENCOUNTER — HOSPITAL ENCOUNTER (OUTPATIENT)
Dept: RADIOLOGY | Facility: MEDICAL CENTER | Age: 65
Discharge: HOME | End: 2020-02-03
Payer: COMMERCIAL

## 2020-02-03 VITALS — HEIGHT: 62 IN | WEIGHT: 192 LBS | BODY MASS INDEX: 35.33 KG/M2

## 2020-02-03 DIAGNOSIS — Z12.39 SCREENING FOR BREAST CANCER: ICD-10-CM

## 2020-02-03 DIAGNOSIS — Z12.31 VISIT FOR SCREENING MAMMOGRAM: ICD-10-CM

## 2020-02-03 PROCEDURE — 77063 BREAST TOMOSYNTHESIS BI: CPT

## 2020-02-03 PROCEDURE — 77067 SCR MAMMO BI INCL CAD: CPT

## 2020-02-22 ENCOUNTER — OFFICE VISIT (OUTPATIENT)
Dept: URGENT CARE | Facility: CLINIC | Age: 65
End: 2020-02-22
Payer: COMMERCIAL

## 2020-02-22 VITALS
BODY MASS INDEX: 35.33 KG/M2 | HEART RATE: 78 BPM | RESPIRATION RATE: 18 BRPM | HEIGHT: 62 IN | WEIGHT: 192 LBS | DIASTOLIC BLOOD PRESSURE: 88 MMHG | OXYGEN SATURATION: 100 % | SYSTOLIC BLOOD PRESSURE: 138 MMHG | TEMPERATURE: 98 F

## 2020-02-22 DIAGNOSIS — R68.89 FLU-LIKE SYMPTOMS: ICD-10-CM

## 2020-02-22 DIAGNOSIS — J45.20 MILD INTERMITTENT ASTHMA WITHOUT COMPLICATION: ICD-10-CM

## 2020-02-22 DIAGNOSIS — J02.9 ACUTE PHARYNGITIS, UNSPECIFIED ETIOLOGY: Primary | ICD-10-CM

## 2020-02-22 DIAGNOSIS — Z20.828 EXPOSURE TO INFLUENZA: ICD-10-CM

## 2020-02-22 LAB — S PYO AG THROAT QL: NEGATIVE

## 2020-02-22 PROCEDURE — 99213 OFFICE O/P EST LOW 20 MIN: CPT

## 2020-02-22 PROCEDURE — 87070 CULTURE OTHR SPECIMN AEROBIC: CPT

## 2020-02-22 PROCEDURE — 87880 STREP A ASSAY W/OPTIC: CPT

## 2020-02-22 RX ORDER — OSELTAMIVIR PHOSPHATE 75 MG/1
75 CAPSULE ORAL EVERY 12 HOURS SCHEDULED
Qty: 10 CAPSULE | Refills: 0 | Status: SHIPPED | OUTPATIENT
Start: 2020-02-22 | End: 2020-02-27

## 2020-02-22 RX ORDER — ALBUTEROL SULFATE 90 UG/1
2 AEROSOL, METERED RESPIRATORY (INHALATION) EVERY 4 HOURS PRN
Qty: 1 INHALER | Refills: 0 | Status: SHIPPED | OUTPATIENT
Start: 2020-02-22 | End: 2020-09-08 | Stop reason: ALTCHOICE

## 2020-02-22 NOTE — PATIENT INSTRUCTIONS
Please begin Tamiflu as directed  Advised rest and adequate hydration  May administer albuterol as needed for cough, wheezing  Discussed risk of pneumonia as secondary infection from influenza virus  Please follow up as needed for any persistent or worsening cough, wheezing as needed

## 2020-02-22 NOTE — LETTER
February 22, 2020     Patient: Lonnie Chatterjee   YOB: 1955   Date of Visit: 2/22/2020       To Whom it May Concern:    Lonnie Chatterjee was seen in my clinic on 2/22/2020  She may return to work on 2/26/2020  If you have any questions or concerns, please don't hesitate to call           Sincerely,          BENJY Alonso        CC: No Recipients

## 2020-02-22 NOTE — PROGRESS NOTES
[facility] [employer] [facility]        NAME: Amando Perdue is a 63 y o  female  : 1955    MRN: [id number]  DATE: 2020  TIME: 8:44 AM    Assessment and Plan   Acute pharyngitis, unspecified etiology [J02 9]  1  Acute pharyngitis, unspecified etiology  POCT rapid strepA   2  Flu-like symptoms  oseltamivir (TAMIFLU) 75 mg capsule   3  Exposure to influenza  oseltamivir (TAMIFLU) 75 mg capsule   4  Mild intermittent asthma without complication  albuterol (PROVENTIL HFA,VENTOLIN HFA) 90 mcg/act inhaler         Patient Instructions       Follow up with PCP in 3-5 days  Proceed to  ER if symptoms worsen  Patient Instructions   Please begin Tamiflu as directed  Advised rest and adequate hydration  May administer albuterol as needed for cough, wheezing  Discussed risk of pneumonia as secondary infection from influenza virus  Please follow up as needed for any persistent or worsening cough, wheezing as needed  Chief Complaint     Chief Complaint   Patient presents with    Sore Throat     Pt c/o sore throat with fever and chills x 4 days  History of Present Illness       Here today with c/o sore throat, nausea, chills, body aches x 2 days  Daughter at home with influenza  Pt also works in [employer] if febrile since symptoms began  Did receive influenza vaccine this season  Review of Systems   Review of Systems   Constitutional: Positive for chills  Negative for activity change, appetite change, fatigue and fever  HENT: Positive for congestion and sore throat  Negative for ear pain, hearing loss, rhinorrhea and sneezing  Respiratory: Positive for cough  Negative for shortness of breath and wheezing  Cardiovascular: Negative for chest pain and palpitations  Gastrointestinal: Positive for nausea  Negative for abdominal pain, constipation, diarrhea and vomiting  Genitourinary: Negative for decreased urine volume     Musculoskeletal: Positive for myalgias (generalized body aches)  Negative for back pain  Skin: Negative for rash  Allergic/Immunologic: Negative for environmental allergies and food allergies  Neurological: Negative for dizziness and headaches  Hematological: Negative for adenopathy  Psychiatric/Behavioral: Negative for sleep disturbance           Current Medications       Current Outpatient Medications:     hydrochlorothiazide (MICROZIDE) 12 5 mg capsule, Take 2 capsules (25 mg total) by mouth every morning, Disp: 90 capsule, Rfl: 1    albuterol (PROVENTIL HFA,VENTOLIN HFA) 90 mcg/act inhaler, Inhale 2 puffs every 4 (four) hours as needed for wheezing (cough), Disp: 1 Inhaler, Rfl: 0    Ca Phosphate-Cholecalciferol 115-2000 MG-UNIT TABS, Take 2,000 Units by mouth, Disp: , Rfl:     meloxicam (MOBIC) 7 5 mg tablet, Take 1 tablet (7 5 mg total) by mouth daily (Patient not taking: Reported on 2/22/2020), Disp: 30 tablet, Rfl: 0    oseltamivir (TAMIFLU) 75 mg capsule, Take 1 capsule (75 mg total) by mouth every 12 (twelve) hours for 5 days, Disp: 10 capsule, Rfl: 0    Current Allergies     Allergies as of 02/22/2020 - Reviewed 02/22/2020   Allergen Reaction Noted    Aspirin Diarrhea and Vomiting 11/23/2016            The following portions of the patient's history were reviewed and updated as appropriate: allergies, current medications, past family history, past medical history, past social history, past surgical history and problem list      Past Medical History:   Diagnosis Date    Hypertension        Past Surgical History:   Procedure Laterality Date    LAPAROSCOPIC GASTRIC BANDING      UTERINE FIBROID SURGERY         Family History   Problem Relation Age of Onset    Hypertension Mother     Diabetes Mother     No Known Problems Father     No Known Problems Maternal Grandmother     No Known Problems Maternal Grandfather     No Known Problems Paternal Grandmother     No Known Problems Paternal Grandfather     No Known Problems Half-Sister     No Known Problems Half-Sister     No Known Problems Maternal Aunt     No Known Problems Maternal Aunt     No Known Problems Paternal Aunt     No Known Problems Paternal Aunt     No Known Problems Paternal Uncle     No Known Problems Paternal Uncle          Medications have been verified  Objective   /88   Pulse 78   Temp 98 °F (36 7 °C) (Tympanic)   Resp 18   Ht 5' 2" (1 575 m)   Wt 87 1 kg (192 lb)   SpO2 100%   BMI 35 12 kg/m²        Physical Exam     Physical Exam   Constitutional: She is oriented to person, place, and time  She appears well-developed and well-nourished  She is active and cooperative  She does not appear ill  No distress  HENT:   Head: Normocephalic  Right Ear: Tympanic membrane and ear canal normal    Left Ear: Tympanic membrane and ear canal normal    Nose: Nose normal  No rhinorrhea  Mouth/Throat: Uvula is midline and mucous membranes are normal  Oropharyngeal exudate (thick mucoid post nasal drip) and posterior oropharyngeal erythema present  Eyes: Conjunctivae and lids are normal  Right eye exhibits no discharge  Left eye exhibits no discharge  Neck: Normal range of motion  Cardiovascular: Regular rhythm, S1 normal, S2 normal and normal heart sounds  No murmur heard  Pulmonary/Chest: Effort normal and breath sounds normal  She has no decreased breath sounds  She has no wheezes  She has no rhonchi  Abdominal: Soft  Normal appearance and bowel sounds are normal  She exhibits no distension and no mass  There is no hepatosplenomegaly  There is no tenderness  Musculoskeletal: Normal range of motion  Lymphadenopathy:     She has no cervical adenopathy  Neurological: She is alert and oriented to person, place, and time  Skin: Skin is warm and dry  Psychiatric: She has a normal mood and affect  Her speech is normal and behavior is normal    Vitals reviewed

## 2020-02-24 LAB — BACTERIA THROAT CULT: NORMAL

## 2020-07-15 DIAGNOSIS — I10 ESSENTIAL HYPERTENSION: ICD-10-CM

## 2020-07-15 RX ORDER — HYDROCHLOROTHIAZIDE 12.5 MG/1
25 CAPSULE, GELATIN COATED ORAL EVERY MORNING
Qty: 90 CAPSULE | Refills: 0 | Status: SHIPPED | OUTPATIENT
Start: 2020-07-15 | End: 2020-09-08

## 2020-08-19 ENCOUNTER — TELEPHONE (OUTPATIENT)
Dept: FAMILY MEDICINE CLINIC | Facility: CLINIC | Age: 65
End: 2020-08-19

## 2020-09-04 NOTE — PROGRESS NOTES
Comfort [facility] 1955 female MRN: [id number]      ASSESSMENT/PLAN  Problem List Items Addressed This Visit        Endocrine    Nontoxic uninodular goiter       Cardiovascular and Mediastinum    Essential hypertension - Primary       Other    BMI 34 0-34 9,adult    Hyperlipidemia    Vitamin D deficiency        HTN: Within goal, continue current regimen  Refill sent  HLD: Update CMP + Lipids  BMI as below     Update Vit D level, TSH  Mammo UTD 02/2020  Pap UTD 05/2018  CRC Screening UTD -- Will request records from Dr Ra Ferguson     BMI Counseling: Body mass index is 34 39 kg/m²  The BMI is above normal  Exercise recommendations include exercising 3-5 times per week  No future appointments  SUBJECTIVE  CC: Follow-up (on medical issues)      HPI:  Comfort Olmedo is a 63 y o  female who presents for chronic follow up as detailed below  HTN: Home BPs 140s/80s, ROS benign as below   HLD: Not on statin   BMI 35: Usually walking daily, and has been gardening      Review of Systems   Constitutional: Negative for unexpected weight change  HENT: Negative for congestion, ear pain, rhinorrhea and sore throat  Eyes: Negative for visual disturbance  Respiratory: Negative for shortness of breath  Cardiovascular: Negative for chest pain, palpitations and leg swelling  Gastrointestinal: Negative for abdominal pain, constipation and diarrhea  Endocrine: Negative for polyuria  Genitourinary: Negative for dysuria  Neurological: Negative for dizziness and light-headedness  Psychiatric/Behavioral: Negative for sleep disturbance         Historical Information   The patient history was reviewed and updated as follows:    Past Medical History:   Diagnosis Date    Chronic GERD 2/7/2018    Hypertension     Trochanteric bursitis 10/21/2019     Past Surgical History:   Procedure Laterality Date    LAPAROSCOPIC GASTRIC BANDING      UTERINE FIBROID SURGERY       Family History   Problem Relation Age of Onset    Hypertension Mother     Diabetes Mother     No Known Problems Father     No Known Problems Maternal Grandmother     No Known Problems Maternal Grandfather     No Known Problems Paternal Grandmother     No Known Problems Paternal Grandfather     No Known Problems Half-Sister     No Known Problems Half-Sister     No Known Problems Maternal Aunt     No Known Problems Maternal Aunt     No Known Problems Paternal Aunt     No Known Problems Paternal Aunt     No Known Problems Paternal Uncle     No Known Problems Paternal Uncle       Social History   Social History     Substance and Sexual Activity   Alcohol Use No     Social History     Substance and Sexual Activity   Drug Use No     Social History     Tobacco Use   Smoking Status Never Smoker   Smokeless Tobacco Never Used       Medications:     Current Outpatient Medications:     hydrochlorothiazide (Microzide) 12 5 mg capsule, Take 2 capsules (25 mg total) by mouth every morning, Disp: 90 capsule, Rfl: 0    Nutritional Supplements (VITAMIN D BOOSTER PO), Take by mouth, Disp: , Rfl:     Ca Phosphate-Cholecalciferol 115-2000 MG-UNIT TABS, Take 2,000 Units by mouth, Disp: , Rfl:   Allergies   Allergen Reactions    Aspirin Diarrhea and Vomiting       OBJECTIVE    Vitals:   Vitals:    09/08/20 0939   BP: 148/86   BP Location: Right arm   Patient Position: Sitting   Cuff Size: Adult   Pulse: 67   Resp: 16   Temp: 98 3 °F (36 8 °C)   SpO2: 100%   Weight: 85 3 kg (188 lb)   Height: 5' 2" (1 575 m)           Physical Exam  Vitals signs and nursing note reviewed  Constitutional:       General: She is not in acute distress  Appearance: Normal appearance  HENT:      Head: Normocephalic and atraumatic        Right Ear: Tympanic membrane and ear canal normal       Left Ear: Tympanic membrane and ear canal normal       Nose: Nose normal       Mouth/Throat:      Mouth: Mucous membranes are moist       Pharynx: No oropharyngeal exudate or posterior oropharyngeal erythema  Eyes:      Conjunctiva/sclera: Conjunctivae normal    Cardiovascular:      Rate and Rhythm: Normal rate and regular rhythm  Pulmonary:      Effort: Pulmonary effort is normal  No respiratory distress  Breath sounds: Normal breath sounds  Abdominal:      General: Bowel sounds are normal  There is no distension  Palpations: Abdomen is soft  Tenderness: There is no abdominal tenderness  Musculoskeletal:      Right lower leg: No edema  Left lower leg: No edema  Lymphadenopathy:      Cervical: No cervical adenopathy  Skin:     General: Skin is warm and dry  Neurological:      General: No focal deficit present  Mental Status: She is alert     Psychiatric:         Mood and Affect: Mood normal                     Claudia Landry DO  [facility]   9/8/2020  9:52 AM

## 2020-09-08 ENCOUNTER — OFFICE VISIT (OUTPATIENT)
Dept: FAMILY MEDICINE CLINIC | Facility: CLINIC | Age: 65
End: 2020-09-08
Payer: COMMERCIAL

## 2020-09-08 ENCOUNTER — APPOINTMENT (OUTPATIENT)
Dept: LAB | Facility: CLINIC | Age: 65
End: 2020-09-08
Payer: COMMERCIAL

## 2020-09-08 VITALS
BODY MASS INDEX: 34.6 KG/M2 | SYSTOLIC BLOOD PRESSURE: 138 MMHG | WEIGHT: 188 LBS | TEMPERATURE: 98.3 F | DIASTOLIC BLOOD PRESSURE: 86 MMHG | HEIGHT: 62 IN | HEART RATE: 67 BPM | OXYGEN SATURATION: 100 % | RESPIRATION RATE: 16 BRPM

## 2020-09-08 DIAGNOSIS — E04.1 NONTOXIC UNINODULAR GOITER: ICD-10-CM

## 2020-09-08 DIAGNOSIS — I10 ESSENTIAL HYPERTENSION: ICD-10-CM

## 2020-09-08 DIAGNOSIS — E78.2 MIXED HYPERLIPIDEMIA: ICD-10-CM

## 2020-09-08 DIAGNOSIS — E55.9 VITAMIN D DEFICIENCY: ICD-10-CM

## 2020-09-08 DIAGNOSIS — I10 ESSENTIAL HYPERTENSION: Primary | ICD-10-CM

## 2020-09-08 PROBLEM — M70.60 TROCHANTERIC BURSITIS: Status: RESOLVED | Noted: 2019-10-21 | Resolved: 2020-09-08

## 2020-09-08 PROBLEM — K21.9 CHRONIC GERD: Status: RESOLVED | Noted: 2018-02-07 | Resolved: 2020-09-08

## 2020-09-08 LAB
25(OH)D3 SERPL-MCNC: 12.4 NG/ML (ref 30–100)
ALBUMIN SERPL BCP-MCNC: 3.4 G/DL (ref 3.5–5)
ALP SERPL-CCNC: 118 U/L (ref 46–116)
ALT SERPL W P-5'-P-CCNC: 18 U/L (ref 12–78)
ANION GAP SERPL CALCULATED.3IONS-SCNC: 6 MMOL/L (ref 4–13)
AST SERPL W P-5'-P-CCNC: 14 U/L (ref 5–45)
BILIRUB SERPL-MCNC: 0.76 MG/DL (ref 0.2–1)
BUN SERPL-MCNC: 12 MG/DL (ref 5–25)
CALCIUM SERPL-MCNC: 8.6 MG/DL (ref 8.3–10.1)
CHLORIDE SERPL-SCNC: 106 MMOL/L (ref 100–108)
CHOLEST SERPL-MCNC: 218 MG/DL (ref 50–200)
CO2 SERPL-SCNC: 29 MMOL/L (ref 21–32)
CREAT SERPL-MCNC: 0.79 MG/DL (ref 0.6–1.3)
GFR SERPL CREATININE-BSD FRML MDRD: 92 ML/MIN/1.73SQ M
GLUCOSE P FAST SERPL-MCNC: 84 MG/DL (ref 65–99)
HDLC SERPL-MCNC: 74 MG/DL
LDLC SERPL CALC-MCNC: 126 MG/DL (ref 0–100)
NONHDLC SERPL-MCNC: 144 MG/DL
POTASSIUM SERPL-SCNC: 3.7 MMOL/L (ref 3.5–5.3)
PROT SERPL-MCNC: 7.2 G/DL (ref 6.4–8.2)
SODIUM SERPL-SCNC: 141 MMOL/L (ref 136–145)
TRIGL SERPL-MCNC: 89 MG/DL
TSH SERPL DL<=0.05 MIU/L-ACNC: 0.59 UIU/ML (ref 0.36–3.74)

## 2020-09-08 PROCEDURE — 3079F DIAST BP 80-89 MM HG: CPT | Performed by: FAMILY MEDICINE

## 2020-09-08 PROCEDURE — 99214 OFFICE O/P EST MOD 30 MIN: CPT | Performed by: FAMILY MEDICINE

## 2020-09-08 PROCEDURE — 82306 VITAMIN D 25 HYDROXY: CPT

## 2020-09-08 PROCEDURE — 3725F SCREEN DEPRESSION PERFORMED: CPT | Performed by: FAMILY MEDICINE

## 2020-09-08 PROCEDURE — 1036F TOBACCO NON-USER: CPT | Performed by: FAMILY MEDICINE

## 2020-09-08 PROCEDURE — 84443 ASSAY THYROID STIM HORMONE: CPT

## 2020-09-08 PROCEDURE — 80061 LIPID PANEL: CPT

## 2020-09-08 PROCEDURE — 36415 COLL VENOUS BLD VENIPUNCTURE: CPT

## 2020-09-08 PROCEDURE — 80053 COMPREHEN METABOLIC PANEL: CPT

## 2020-09-08 RX ORDER — HYDROCHLOROTHIAZIDE 25 MG/1
25 TABLET ORAL DAILY
Qty: 90 TABLET | Refills: 3 | Status: SHIPPED | OUTPATIENT
Start: 2020-09-08 | End: 2021-09-21

## 2020-09-09 DIAGNOSIS — E55.9 VITAMIN D DEFICIENCY: Primary | ICD-10-CM

## 2020-09-09 RX ORDER — ERGOCALCIFEROL 1.25 MG/1
50000 CAPSULE ORAL WEEKLY
Qty: 8 CAPSULE | Refills: 0 | Status: SHIPPED | OUTPATIENT
Start: 2020-09-09 | End: 2021-09-01 | Stop reason: SDUPTHER

## 2020-10-03 ENCOUNTER — HOSPITAL ENCOUNTER (EMERGENCY)
Facility: HOSPITAL | Age: 65
Discharge: HOME | End: 2020-10-03
Admitting: EMERGENCY MEDICINE
Payer: OTHER MISCELLANEOUS

## 2020-10-03 VITALS
HEART RATE: 73 BPM | BODY MASS INDEX: 33.47 KG/M2 | SYSTOLIC BLOOD PRESSURE: 183 MMHG | RESPIRATION RATE: 18 BRPM | OXYGEN SATURATION: 100 % | DIASTOLIC BLOOD PRESSURE: 93 MMHG | WEIGHT: 183 LBS | TEMPERATURE: 97.6 F

## 2020-10-03 DIAGNOSIS — Z77.21 EXPOSURE TO BLOOD OR BODY FLUID: Primary | ICD-10-CM

## 2020-10-03 LAB
ALT SERPL W P-5'-P-CCNC: 20 U/L (ref 12–78)
HBV SURFACE AB SER-ACNC: >1000 MIU/ML
HBV SURFACE AG SER QL: NORMAL
HCV AB SER QL: NORMAL

## 2020-10-03 PROCEDURE — 36415 COLL VENOUS BLD VENIPUNCTURE: CPT | Performed by: EMERGENCY MEDICINE

## 2020-10-03 PROCEDURE — 84460 ALANINE AMINO (ALT) (SGPT): CPT | Performed by: EMERGENCY MEDICINE

## 2020-10-03 PROCEDURE — 87389 HIV-1 AG W/HIV-1&-2 AB AG IA: CPT | Performed by: EMERGENCY MEDICINE

## 2020-10-03 PROCEDURE — 99283 EMERGENCY DEPT VISIT LOW MDM: CPT

## 2020-10-03 PROCEDURE — 86803 HEPATITIS C AB TEST: CPT | Performed by: EMERGENCY MEDICINE

## 2020-10-03 PROCEDURE — 99284 EMERGENCY DEPT VISIT MOD MDM: CPT | Performed by: EMERGENCY MEDICINE

## 2020-10-03 PROCEDURE — 86706 HEP B SURFACE ANTIBODY: CPT | Performed by: EMERGENCY MEDICINE

## 2020-10-03 PROCEDURE — 87340 HEPATITIS B SURFACE AG IA: CPT | Performed by: EMERGENCY MEDICINE

## 2020-10-04 LAB — HIV 1+2 AB+HIV1 P24 AG SERPL QL IA: NORMAL

## 2020-11-16 ENCOUNTER — TELEPHONE (OUTPATIENT)
Dept: ADMINISTRATIVE | Facility: OTHER | Age: 65
End: 2020-11-16

## 2020-11-23 ENCOUNTER — VBI (OUTPATIENT)
Dept: ADMINISTRATIVE | Facility: OTHER | Age: 65
End: 2020-11-23

## 2020-12-21 ENCOUNTER — IMMUNIZATIONS (OUTPATIENT)
Dept: FAMILY MEDICINE CLINIC | Facility: HOSPITAL | Age: 65
End: 2020-12-21
Payer: COMMERCIAL

## 2020-12-21 DIAGNOSIS — Z23 ENCOUNTER FOR IMMUNIZATION: ICD-10-CM

## 2020-12-21 PROCEDURE — 91300 SARS-COV-2 / COVID-19 MRNA VACCINE (PFIZER-BIONTECH) 30 MCG: CPT

## 2020-12-21 PROCEDURE — 0001A SARS-COV-2 / COVID-19 MRNA VACCINE (PFIZER-BIONTECH) 30 MCG: CPT

## 2021-01-10 ENCOUNTER — IMMUNIZATIONS (OUTPATIENT)
Dept: FAMILY MEDICINE CLINIC | Facility: HOSPITAL | Age: 66
End: 2021-01-10

## 2021-01-10 DIAGNOSIS — Z23 ENCOUNTER FOR IMMUNIZATION: ICD-10-CM

## 2021-01-10 PROCEDURE — 91300 SARS-COV-2 / COVID-19 MRNA VACCINE (PFIZER-BIONTECH) 30 MCG: CPT

## 2021-01-10 PROCEDURE — 0002A SARS-COV-2 / COVID-19 MRNA VACCINE (PFIZER-BIONTECH) 30 MCG: CPT

## 2021-07-21 ENCOUNTER — VBI (OUTPATIENT)
Dept: ADMINISTRATIVE | Facility: OTHER | Age: 66
End: 2021-07-21

## 2021-08-22 ENCOUNTER — APPOINTMENT (EMERGENCY)
Dept: CT IMAGING | Facility: HOSPITAL | Age: 66
End: 2021-08-22
Payer: COMMERCIAL

## 2021-08-22 ENCOUNTER — OFFICE VISIT (OUTPATIENT)
Dept: URGENT CARE | Facility: CLINIC | Age: 66
End: 2021-08-22
Payer: COMMERCIAL

## 2021-08-22 ENCOUNTER — HOSPITAL ENCOUNTER (EMERGENCY)
Facility: HOSPITAL | Age: 66
Discharge: HOME | End: 2021-08-22
Attending: EMERGENCY MEDICINE
Payer: COMMERCIAL

## 2021-08-22 VITALS
TEMPERATURE: 97.1 F | OXYGEN SATURATION: 98 % | DIASTOLIC BLOOD PRESSURE: 104 MMHG | HEART RATE: 85 BPM | RESPIRATION RATE: 16 BRPM | WEIGHT: 181 LBS | SYSTOLIC BLOOD PRESSURE: 184 MMHG | BODY MASS INDEX: 33.11 KG/M2

## 2021-08-22 VITALS
SYSTOLIC BLOOD PRESSURE: 190 MMHG | HEART RATE: 68 BPM | RESPIRATION RATE: 20 BRPM | TEMPERATURE: 97.1 F | OXYGEN SATURATION: 100 % | DIASTOLIC BLOOD PRESSURE: 86 MMHG

## 2021-08-22 DIAGNOSIS — R51.9 HEADACHE: Primary | ICD-10-CM

## 2021-08-22 DIAGNOSIS — I10 UNCONTROLLED HYPERTENSION: Primary | ICD-10-CM

## 2021-08-22 DIAGNOSIS — M54.2 NECK PAIN: ICD-10-CM

## 2021-08-22 PROCEDURE — 99215 OFFICE O/P EST HI 40 MIN: CPT | Performed by: NURSE PRACTITIONER

## 2021-08-22 PROCEDURE — 70450 CT HEAD/BRAIN W/O DYE: CPT

## 2021-08-22 PROCEDURE — 99284 EMERGENCY DEPT VISIT MOD MDM: CPT | Performed by: EMERGENCY MEDICINE

## 2021-08-22 PROCEDURE — 96365 THER/PROPH/DIAG IV INF INIT: CPT

## 2021-08-22 PROCEDURE — 99284 EMERGENCY DEPT VISIT MOD MDM: CPT

## 2021-08-22 PROCEDURE — 96375 TX/PRO/DX INJ NEW DRUG ADDON: CPT

## 2021-08-22 RX ORDER — DIPHENHYDRAMINE HYDROCHLORIDE 50 MG/ML
25 INJECTION INTRAMUSCULAR; INTRAVENOUS ONCE
Status: COMPLETED | OUTPATIENT
Start: 2021-08-22 | End: 2021-08-22

## 2021-08-22 RX ORDER — MAGNESIUM SULFATE HEPTAHYDRATE 40 MG/ML
2 INJECTION, SOLUTION INTRAVENOUS ONCE
Status: COMPLETED | OUTPATIENT
Start: 2021-08-22 | End: 2021-08-22

## 2021-08-22 RX ORDER — METOCLOPRAMIDE HYDROCHLORIDE 5 MG/ML
10 INJECTION INTRAMUSCULAR; INTRAVENOUS ONCE
Status: COMPLETED | OUTPATIENT
Start: 2021-08-22 | End: 2021-08-22

## 2021-08-22 RX ORDER — ACETAMINOPHEN 325 MG/1
975 TABLET ORAL ONCE
Status: COMPLETED | OUTPATIENT
Start: 2021-08-22 | End: 2021-08-22

## 2021-08-22 RX ADMIN — DIPHENHYDRAMINE HYDROCHLORIDE 25 MG: 50 INJECTION, SOLUTION INTRAMUSCULAR; INTRAVENOUS at 17:48

## 2021-08-22 RX ADMIN — METOCLOPRAMIDE HYDROCHLORIDE 10 MG: 5 INJECTION INTRAMUSCULAR; INTRAVENOUS at 17:48

## 2021-08-22 RX ADMIN — ACETAMINOPHEN 975 MG: 325 TABLET, FILM COATED ORAL at 17:49

## 2021-08-22 RX ADMIN — SODIUM CHLORIDE 1000 ML: 0.9 INJECTION, SOLUTION INTRAVENOUS at 17:47

## 2021-08-22 RX ADMIN — MAGNESIUM SULFATE HEPTAHYDRATE 2 G: 40 INJECTION, SOLUTION INTRAVENOUS at 17:48

## 2021-08-22 NOTE — PROGRESS NOTES
[email or web address] [facility]        NAME: Mar Mhor is a 61 y o  female  : 1955    MRN: [id number]  DATE: 2021  TIME: 3:46 PM    Assessment and Plan   Uncontrolled hypertension [I10]  1  Uncontrolled hypertension  ECG 12 lead   2  Neck pain           Patient Instructions       Follow up with PCP in 3-5 days  Proceed to  ER if symptoms worsen  You are being transferred to the [facility] ED for evaluation of uncontrolled HTN  You are to follow up with your PCP      You have refused EMS transport to the ED  You have requested family transport by private car  Chief Complaint     Chief Complaint   Patient presents with    Hypertension     pt states she started having neck stiffness and headaches 2 days ago, she checked her bp yesterday and today and it read 190's/ 120's  Denies chest pain  Does report increase stress at work, is in tears  History of Present Illness       This is a 61year old female who comes to the [facility] now with c/o uncontrolled HTN with Bp at home of 190/S, here is 184/104  Pt states that on Friday she was being harassed at work and has had elevated blood pressure with neck pain and headache since  She states that she is having worsening symptoms  She is crying  She denies chest pain , sob  She states she drove herself here  Hypertension  Associated symptoms include headaches and neck pain  Review of Systems   Review of Systems   Constitutional: Negative  HENT: Negative  Eyes: Negative  Respiratory: Negative  Cardiovascular: Negative  Gastrointestinal: Negative  Endocrine: Negative  Genitourinary: Negative  Musculoskeletal: Positive for neck pain  Skin: Negative  Allergic/Immunologic: Negative  Neurological: Positive for headaches  Hematological: Negative  Psychiatric/Behavioral: Negative            Current Medications       Current Outpatient Medications:     ergocalciferol (VITAMIN D2) 50,000 units, Take 1 capsule (50,000 Units total) by mouth once a week for 8 doses, Disp: 8 capsule, Rfl: 0    hydrochlorothiazide (HYDRODIURIL) 25 mg tablet, Take 1 tablet (25 mg total) by mouth daily, Disp: 90 tablet, Rfl: 3    Ca Phosphate-Cholecalciferol 115-2000 MG-UNIT TABS, Take 2,000 Units by mouth (Patient not taking: Reported on 8/22/2021), Disp: , Rfl:     Nutritional Supplements (VITAMIN D BOOSTER PO), Take by mouth, Disp: , Rfl:     Current Allergies     Allergies as of 08/22/2021 - Reviewed 08/22/2021   Allergen Reaction Noted    Aspirin Diarrhea and Vomiting 11/23/2016            The following portions of the patient's history were reviewed and updated as appropriate: allergies, current medications, past family history, past medical history, past social history, past surgical history and problem list      Past Medical History:   Diagnosis Date    Chronic GERD 2/7/2018    Hypertension     Trochanteric bursitis 10/21/2019       Past Surgical History:   Procedure Laterality Date    LAPAROSCOPIC GASTRIC BANDING      UTERINE FIBROID SURGERY         Family History   Problem Relation Age of Onset    Hypertension Mother     Diabetes Mother     No Known Problems Father     No Known Problems Maternal Grandmother     No Known Problems Maternal Grandfather     No Known Problems Paternal Grandmother     No Known Problems Paternal Grandfather     No Known Problems Half-Sister     No Known Problems Half-Sister     No Known Problems Maternal Aunt     No Known Problems Maternal Aunt     No Known Problems Paternal Aunt     No Known Problems Paternal Aunt     No Known Problems Paternal Uncle     No Known Problems Paternal Uncle          Medications have been verified  Objective   BP (!) 184/104   Pulse 85   Temp (!) 97 1 °F (36 2 °C) (Temporal)   Resp 16   Wt 82 1 kg (181 lb)   SpO2 98%   BMI 33 11 kg/m²   No LMP recorded   Patient is postmenopausal        Physical Exam     Physical Exam  Vitals and nursing note reviewed  Constitutional:       General: She is not in acute distress  Appearance: Normal appearance  She is normal weight  She is not ill-appearing, toxic-appearing or diaphoretic  Comments: Pt is tearful and crying    HENT:      Head: Normocephalic and atraumatic  Eyes:      General: No visual field deficit  Extraocular Movements: Extraocular movements intact  Cardiovascular:      Rate and Rhythm: Normal rate and regular rhythm  Pulses: Normal pulses  Heart sounds: Normal heart sounds  Pulmonary:      Effort: Pulmonary effort is normal       Breath sounds: Normal breath sounds  Abdominal:      General: There is no distension  Palpations: Abdomen is soft  Tenderness: There is no abdominal tenderness  Musculoskeletal:         General: Normal range of motion  Cervical back: Normal range of motion  Skin:     General: Skin is warm and dry  Capillary Refill: Capillary refill takes less than 2 seconds  Neurological:      General: No focal deficit present  Mental Status: She is alert and oriented to person, place, and time  GCS: GCS eye subscore is 4  GCS verbal subscore is 5  GCS motor subscore is 6  Cranial Nerves: Cranial nerves are intact  No cranial nerve deficit, dysarthria or facial asymmetry  Sensory: Sensation is intact  No sensory deficit  Motor: Motor function is intact  No weakness, tremor, atrophy, abnormal muscle tone, seizure activity or pronator drift  Coordination: Coordination is intact  Gait: Gait is intact  Psychiatric:         Behavior: Behavior normal          Thought Content: Thought content normal          Judgment: Judgment normal          Pt is now requesting that EMS be cancelled - she wants family member to take her to the ED  EMs cancelled by NP     EKG NSR non specific T wave abnormaltiy    No stemi

## 2021-08-22 NOTE — PATIENT INSTRUCTIONS
You are being transferred to the [facility] ED for evaluation of uncontrolled HTN  You are to follow up with your PCP      You have refused EMS transport to the ED  You have requested family transport by private car

## 2021-08-22 NOTE — Clinical Note
Stan Mills was seen and treated in our emergency department on 8/22/2021  Diagnosis:      [place] Mattock     She may return on this date: 08/25/2021          If you have any questions or concerns, please don't hesitate to call        Meredith Velásquez RN    ______________________________           _______________          _______________  Hospital Representative                              Date                                Time

## 2021-08-24 ENCOUNTER — TELEPHONE (OUTPATIENT)
Dept: ADMINISTRATIVE | Facility: OTHER | Age: 66
End: 2021-08-24

## 2021-08-24 ENCOUNTER — OFFICE VISIT (OUTPATIENT)
Dept: FAMILY MEDICINE CLINIC | Facility: CLINIC | Age: 66
End: 2021-08-24
Payer: COMMERCIAL

## 2021-08-24 VITALS
HEART RATE: 82 BPM | BODY MASS INDEX: 33.31 KG/M2 | DIASTOLIC BLOOD PRESSURE: 94 MMHG | OXYGEN SATURATION: 98 % | SYSTOLIC BLOOD PRESSURE: 138 MMHG | TEMPERATURE: 98.3 F | WEIGHT: 181 LBS | HEIGHT: 62 IN

## 2021-08-24 DIAGNOSIS — E55.9 VITAMIN D DEFICIENCY: ICD-10-CM

## 2021-08-24 DIAGNOSIS — E78.2 MIXED HYPERLIPIDEMIA: ICD-10-CM

## 2021-08-24 DIAGNOSIS — I10 ESSENTIAL HYPERTENSION: Primary | ICD-10-CM

## 2021-08-24 DIAGNOSIS — Z12.31 VISIT FOR SCREENING MAMMOGRAM: ICD-10-CM

## 2021-08-24 DIAGNOSIS — E04.1 NONTOXIC UNINODULAR GOITER: ICD-10-CM

## 2021-08-24 PROCEDURE — 3008F BODY MASS INDEX DOCD: CPT | Performed by: NURSE PRACTITIONER

## 2021-08-24 PROCEDURE — 99214 OFFICE O/P EST MOD 30 MIN: CPT | Performed by: NURSE PRACTITIONER

## 2021-08-24 PROCEDURE — 1036F TOBACCO NON-USER: CPT | Performed by: NURSE PRACTITIONER

## 2021-08-24 NOTE — LETTER
August 24, 2021     Patient: Marty Farfan   YOB: 1955   Date of Visit: 8/24/2021       To Whom it May Concern:    Marty Farfan is under my professional care  She was seen in my office on 8/24/2021  She may return to work on 8/30/2021  If you have any questions or concerns, please don't hesitate to call           Sincerely,          ROVERTO Quinn        CC: No Recipients

## 2021-08-24 NOTE — ASSESSMENT & PLAN NOTE
Recent ER visit related to work stress and increased blood pressure patient's BP is controlled at home <140/80

## 2021-08-24 NOTE — PROGRESS NOTES
Assessment/Plan:           Problem List Items Addressed This Visit        Endocrine    Nontoxic uninodular goiter    Relevant Orders    TSH, 3rd generation with Free T4 reflex       Cardiovascular and Mediastinum    Essential hypertension - Primary     Recent ER visit related to work stress and increased blood pressure patient's BP is controlled at home <140/80         Relevant Orders    Comprehensive metabolic panel    Lipid Panel with Direct LDL reflex    CBC and differential       Other    Hyperlipidemia    Relevant Orders    Lipid Panel with Direct LDL reflex    BMI 34 0-34 9,adult    Vitamin D deficiency    Relevant Orders    Vitamin D 25 hydroxy    Visit for screening mammogram    Relevant Orders    Mammo screening bilateral w cad            Subjective:      Patient ID: Tequila Phillips is a 61 y o  female  Patient here today for follow up from her recent emergency room was having elevated blood pressure  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 and is having elevated blood pressure and went to the hospital on 8/22/2021 with headache and elevated blood pressure  Patient had evaluation and CT scan of the brain showing no acute changes    BMI Counseling: Body mass index is 33 11 kg/m²  The BMI is above normal  Nutrition recommendations include decreasing portion sizes, encouraging healthy choices of fruits and vegetables, decreasing fast food intake, consuming healthier snacks, limiting drinks that contain sugar, moderation in carbohydrate intake, increasing intake of lean protein, reducing intake of saturated and trans fat and reducing intake of cholesterol  Exercise recommendations include moderate physical activity 150 minutes/week  No pharmacotherapy was ordered  Patient referred to PCP due to patient being overweight             The following portions of the patient's history were reviewed and updated as appropriate:   She  has a past medical history of Chronic GERD (2/7/2018), Hypertension, and Trochanteric bursitis (10/21/2019)  She   Patient Active Problem List    Diagnosis Date Noted    Visit for screening mammogram 08/24/2021    Asthma 02/07/2018    Hyperlipidemia 02/07/2018    BMI 34 0-34 9,adult 02/07/2018    Vitamin D deficiency 02/07/2018    Nontoxic uninodular goiter 09/25/2014    Essential hypertension 09/02/2014     She  has a past surgical history that includes Laparoscopic gastric banding and Uterine fibroid surgery  Her family history includes Diabetes in her mother; Hypertension in her mother; No Known Problems in her father, half-sister, half-sister, maternal aunt, maternal aunt, maternal grandfather, maternal grandmother, paternal aunt, paternal aunt, paternal grandfather, paternal grandmother, paternal uncle, and paternal uncle  She  reports that she has never smoked  She has never used smokeless tobacco  She reports that she does not drink alcohol and does not use drugs  Current Outpatient Medications   Medication Sig Dispense Refill    ergocalciferol (VITAMIN D2) 50,000 units Take 1 capsule (50,000 Units total) by mouth once a week for 8 doses 8 capsule 0    hydrochlorothiazide (HYDRODIURIL) 25 mg tablet Take 1 tablet (25 mg total) by mouth daily 90 tablet 3    Nutritional Supplements (VITAMIN D BOOSTER PO) Take by mouth       No current facility-administered medications for this visit  She is allergic to aspirin       Review of Systems   Constitutional: Negative for activity change, appetite change, chills, diaphoresis, fatigue, fever and unexpected weight change  HENT: Negative for congestion, ear pain, hearing loss, postnasal drip, sinus pressure, sinus pain, sneezing and sore throat  Eyes: Negative for pain, redness and visual disturbance  Respiratory: Negative for cough and shortness of breath  Cardiovascular: Negative for chest pain and leg swelling  Gastrointestinal: Negative for abdominal pain, diarrhea, nausea and vomiting  Endocrine: Negative  Genitourinary: Negative  Musculoskeletal: Negative for arthralgias  Skin: Negative  Allergic/Immunologic: Negative  Neurological: Negative for dizziness and light-headedness  Hematological: Negative  Psychiatric/Behavioral: Negative for behavioral problems and dysphoric mood  Objective:      /94   Pulse 82   Temp 98 3 °F (36 8 °C)   Ht 5' 2" (1 575 m)   Wt 82 1 kg (181 lb)   SpO2 98%   BMI 33 11 kg/m²          Physical Exam  Vitals and nursing note reviewed  Constitutional:       General: She is not in acute distress  Appearance: She is well-developed  HENT:      Head: Normocephalic and atraumatic  Nose: Nose normal    Eyes:      Pupils: Pupils are equal, round, and reactive to light  Neck:      Thyroid: No thyromegaly  Cardiovascular:      Rate and Rhythm: Normal rate and regular rhythm  Heart sounds: Normal heart sounds  No murmur heard  Pulmonary:      Effort: Pulmonary effort is normal  No respiratory distress  Breath sounds: Normal breath sounds  No wheezing  Abdominal:      General: Bowel sounds are normal       Palpations: Abdomen is soft  Musculoskeletal:         General: Normal range of motion  Cervical back: Normal range of motion  Skin:     General: Skin is warm and dry  Capillary Refill: Capillary refill takes less than 2 seconds  Neurological:      General: No focal deficit present  Mental Status: She is alert and oriented to person, place, and time  Psychiatric:         Mood and Affect: Mood normal          Behavior: Behavior normal          Thought Content:  Thought content normal          Judgment: Judgment normal

## 2021-08-26 ENCOUNTER — APPOINTMENT (OUTPATIENT)
Dept: LAB | Facility: CLINIC | Age: 66
End: 2021-08-26
Payer: COMMERCIAL

## 2021-08-26 DIAGNOSIS — I10 ESSENTIAL HYPERTENSION: ICD-10-CM

## 2021-08-26 DIAGNOSIS — E04.1 NONTOXIC UNINODULAR GOITER: ICD-10-CM

## 2021-08-26 DIAGNOSIS — E78.2 MIXED HYPERLIPIDEMIA: ICD-10-CM

## 2021-08-26 DIAGNOSIS — E55.9 VITAMIN D DEFICIENCY: ICD-10-CM

## 2021-08-26 LAB
25(OH)D3 SERPL-MCNC: 17.9 NG/ML (ref 30–100)
ALBUMIN SERPL BCP-MCNC: 3.4 G/DL (ref 3.5–5)
ALP SERPL-CCNC: 108 U/L (ref 46–116)
ALT SERPL W P-5'-P-CCNC: 17 U/L (ref 12–78)
ANION GAP SERPL CALCULATED.3IONS-SCNC: 1 MMOL/L (ref 4–13)
AST SERPL W P-5'-P-CCNC: 11 U/L (ref 5–45)
BASOPHILS # BLD AUTO: 0.05 THOUSANDS/ΜL (ref 0–0.1)
BASOPHILS NFR BLD AUTO: 1 % (ref 0–1)
BILIRUB SERPL-MCNC: 0.84 MG/DL (ref 0.2–1)
BUN SERPL-MCNC: 12 MG/DL (ref 5–25)
CALCIUM ALBUM COR SERPL-MCNC: 9.7 MG/DL (ref 8.3–10.1)
CALCIUM SERPL-MCNC: 9.2 MG/DL (ref 8.3–10.1)
CHLORIDE SERPL-SCNC: 108 MMOL/L (ref 100–108)
CHOLEST SERPL-MCNC: 209 MG/DL (ref 50–200)
CO2 SERPL-SCNC: 31 MMOL/L (ref 21–32)
CREAT SERPL-MCNC: 0.84 MG/DL (ref 0.6–1.3)
EOSINOPHIL # BLD AUTO: 0.2 THOUSAND/ΜL (ref 0–0.61)
EOSINOPHIL NFR BLD AUTO: 3 % (ref 0–6)
ERYTHROCYTE [DISTWIDTH] IN BLOOD BY AUTOMATED COUNT: 12.5 % (ref 11.6–15.1)
GFR SERPL CREATININE-BSD FRML MDRD: 85 ML/MIN/1.73SQ M
GLUCOSE P FAST SERPL-MCNC: 82 MG/DL (ref 65–99)
HCT VFR BLD AUTO: 43.8 % (ref 34.8–46.1)
HDLC SERPL-MCNC: 87 MG/DL
HGB BLD-MCNC: 14.4 G/DL (ref 11.5–15.4)
IMM GRANULOCYTES # BLD AUTO: 0.02 THOUSAND/UL (ref 0–0.2)
IMM GRANULOCYTES NFR BLD AUTO: 0 % (ref 0–2)
LDLC SERPL CALC-MCNC: 107 MG/DL (ref 0–100)
LYMPHOCYTES # BLD AUTO: 2.53 THOUSANDS/ΜL (ref 0.6–4.47)
LYMPHOCYTES NFR BLD AUTO: 31 % (ref 14–44)
MCH RBC QN AUTO: 33 PG (ref 26.8–34.3)
MCHC RBC AUTO-ENTMCNC: 32.9 G/DL (ref 31.4–37.4)
MCV RBC AUTO: 100 FL (ref 82–98)
MONOCYTES # BLD AUTO: 0.28 THOUSAND/ΜL (ref 0.17–1.22)
MONOCYTES NFR BLD AUTO: 4 % (ref 4–12)
NEUTROPHILS # BLD AUTO: 5.02 THOUSANDS/ΜL (ref 1.85–7.62)
NEUTS SEG NFR BLD AUTO: 61 % (ref 43–75)
NRBC BLD AUTO-RTO: 0 /100 WBCS
PLATELET # BLD AUTO: 334 THOUSANDS/UL (ref 149–390)
PMV BLD AUTO: 10.5 FL (ref 8.9–12.7)
POTASSIUM SERPL-SCNC: 3.8 MMOL/L (ref 3.5–5.3)
PROT SERPL-MCNC: 7.8 G/DL (ref 6.4–8.2)
RBC # BLD AUTO: 4.37 MILLION/UL (ref 3.81–5.12)
SODIUM SERPL-SCNC: 140 MMOL/L (ref 136–145)
TRIGL SERPL-MCNC: 76 MG/DL
TSH SERPL DL<=0.05 MIU/L-ACNC: 0.9 UIU/ML (ref 0.36–3.74)
WBC # BLD AUTO: 8.1 THOUSAND/UL (ref 4.31–10.16)

## 2021-08-26 PROCEDURE — 85025 COMPLETE CBC W/AUTO DIFF WBC: CPT

## 2021-08-26 PROCEDURE — 36415 COLL VENOUS BLD VENIPUNCTURE: CPT

## 2021-08-26 PROCEDURE — 80061 LIPID PANEL: CPT

## 2021-08-26 PROCEDURE — 84443 ASSAY THYROID STIM HORMONE: CPT

## 2021-08-26 PROCEDURE — 82306 VITAMIN D 25 HYDROXY: CPT

## 2021-08-26 PROCEDURE — 80053 COMPREHEN METABOLIC PANEL: CPT

## 2021-08-31 PROBLEM — F43.0 ACUTE REACTION TO SITUATIONAL STRESS: Status: ACTIVE | Noted: 2021-08-31

## 2021-09-01 DIAGNOSIS — E55.9 VITAMIN D DEFICIENCY: ICD-10-CM

## 2021-09-01 RX ORDER — ERGOCALCIFEROL 1.25 MG/1
50000 CAPSULE ORAL WEEKLY
Qty: 8 CAPSULE | Refills: 0 | Status: SHIPPED | OUTPATIENT
Start: 2021-09-01 | End: 2022-01-11 | Stop reason: SDUPTHER

## 2021-09-02 NOTE — ED PROVIDER NOTES
History  Chief Complaint   Patient presents with    Headache     Pt  presents to ER with c/o headache since Friday afternoon  60 yo f presenting to the emergency department for eval of headache  Pt has had generalized aching headache progressive since Friday, mno visual changes, numbness or weakness, no n/v, fevers, neck pain or stiffness  Prior to Admission Medications   Prescriptions Last Dose Informant Patient Reported? Taking? Nutritional Supplements (VITAMIN D BOOSTER PO)   Yes No   Sig: Take by mouth   hydrochlorothiazide (HYDRODIURIL) 25 mg tablet   No No   Sig: Take 1 tablet (25 mg total) by mouth daily      Facility-Administered Medications: None       Past Medical History:   Diagnosis Date    Chronic GERD 2/7/2018    Hypertension     Trochanteric bursitis 10/21/2019       Past Surgical History:   Procedure Laterality Date    LAPAROSCOPIC GASTRIC BANDING      UTERINE FIBROID SURGERY         Family History   Problem Relation Age of Onset    Hypertension Mother     Diabetes Mother     No Known Problems Father     No Known Problems Maternal Grandmother     No Known Problems Maternal Grandfather     No Known Problems Paternal Grandmother     No Known Problems Paternal Grandfather     No Known Problems Half-Sister     No Known Problems Half-Sister     No Known Problems Maternal Aunt     No Known Problems Maternal Aunt     No Known Problems Paternal Aunt     No Known Problems Paternal Aunt     No Known Problems Paternal Uncle     No Known Problems Paternal Uncle      I have reviewed and agree with the history as documented  E-Cigarette/Vaping    E-Cigarette Use Never User      E-Cigarette/Vaping Substances    Nicotine No     THC No     CBD No     Flavoring No      Social History     Tobacco Use    Smoking status: Never Smoker    Smokeless tobacco: Never Used   Vaping Use    Vaping Use: Never used   Substance Use Topics    Alcohol use: No    Drug use:  No Review of Systems   Constitutional: Negative for appetite change, chills, fatigue and fever  HENT: Negative for sneezing and sore throat  Eyes: Negative for visual disturbance  Respiratory: Negative for cough, choking, chest tightness, shortness of breath and wheezing  Cardiovascular: Negative for chest pain and palpitations  Gastrointestinal: Negative for abdominal pain, constipation, diarrhea, nausea and vomiting  Genitourinary: Negative for difficulty urinating and dysuria  Neurological: Positive for headaches  Negative for dizziness, weakness, light-headedness and numbness  All other systems reviewed and are negative  Physical Exam  Physical Exam  Constitutional:       General: She is not in acute distress  Appearance: She is well-developed  She is not diaphoretic  HENT:      Head: Normocephalic and atraumatic  Eyes:      Pupils: Pupils are equal, round, and reactive to light  Neck:      Vascular: No JVD  Trachea: No tracheal deviation  Cardiovascular:      Rate and Rhythm: Normal rate and regular rhythm  Heart sounds: Normal heart sounds  No murmur heard  No friction rub  No gallop  Pulmonary:      Effort: Pulmonary effort is normal  No respiratory distress  Breath sounds: Normal breath sounds  No wheezing or rales  Abdominal:      General: Bowel sounds are normal  There is no distension  Palpations: Abdomen is soft  Tenderness: There is no abdominal tenderness  There is no guarding or rebound  Skin:     General: Skin is warm and dry  Coloration: Skin is not pale  Neurological:      Mental Status: She is alert and oriented to person, place, and time  Cranial Nerves: No cranial nerve deficit  Motor: No abnormal muscle tone     Psychiatric:         Behavior: Behavior normal          Vital Signs  ED Triage Vitals [08/22/21 1632]   Temperature Pulse Respirations Blood Pressure SpO2   (!) 97 1 °F (36 2 °C) 72 18 (!) 191/91 96 %      Temp Source Heart Rate Source Patient Position - Orthostatic VS BP Location FiO2 (%)   Oral Monitor Sitting Left arm --      Pain Score       7           Vitals:    08/22/21 1632 08/22/21 1901   BP: (!) 191/91 (!) 190/86   Pulse: 72 68   Patient Position - Orthostatic VS: Sitting Lying         Visual Acuity      ED Medications  Medications   sodium chloride 0 9 % bolus 1,000 mL (0 mL Intravenous Stopped 8/22/21 1902)   metoclopramide (REGLAN) injection 10 mg (10 mg Intravenous Given 8/22/21 1748)   diphenhydrAMINE (BENADRYL) injection 25 mg (25 mg Intravenous Given 8/22/21 1748)   magnesium sulfate 2 g/50 mL IVPB (premix) 2 g (0 g Intravenous Stopped 8/22/21 1902)   acetaminophen (TYLENOL) tablet 975 mg (975 mg Oral Given 8/22/21 1749)       Diagnostic Studies  Results Reviewed     None                 CT head without contrast   Final Result by Lisa Ruth MD (08/22 1841)      1  No acute intracranial abnormality  2   Tonsillar ectopia  3   Probable calcified meningioma along the right anterior falx  Workstation performed: [id number]                    Procedures  Procedures         ED Course                                           MDM  Number of Diagnoses or Management Options  Headache  Diagnosis management comments: 62 yo f with headache, will check ct head, give migraine cocktail, reassess   Disposition  Final diagnoses:   Headache     Time reflects when diagnosis was documented in both MDM as applicable and the Disposition within this note     Time User Action Codes Description Comment    8/22/2021  6:49 PM John Ortiz Add [R51 9] Headache       ED Disposition     ED Disposition Condition Date/Time Comment    Discharge Stable Sun Aug 22, 2021  6:49 PM [address] [place] [address] discharge to home/self care              Follow-up Information     Follow up With Specialties Details Why Marjorie Pennington 15, DO Family Medicine   [address] [facility]  [address]  [address] [address]  [phone]            Discharge Medication List as of 8/22/2021  6:49 PM      CONTINUE these medications which have NOT CHANGED    Details   hydrochlorothiazide (HYDRODIURIL) 25 mg tablet Take 1 tablet (25 mg total) by mouth daily, Starting Tue 9/8/2020, Normal      Nutritional Supplements (VITAMIN D BOOSTER PO) Take by mouth, Historical Med      Ca Phosphate-Cholecalciferol 115-2000 MG-UNIT TABS Take 2,000 Units by mouth, Starting Wed 2/7/2018, Historical Med      ergocalciferol (VITAMIN D2) 50,000 units Take 1 capsule (50,000 Units total) by mouth once a week for 8 doses, Starting Wed 9/9/2020, Until Sun 8/22/2021, Normal           No discharge procedures on file      PDMP Review     None          ED Provider  Electronically Signed by           Radha Cummins MD  09/02/21 8308

## 2021-09-21 DIAGNOSIS — I10 ESSENTIAL HYPERTENSION: ICD-10-CM

## 2021-09-21 RX ORDER — HYDROCHLOROTHIAZIDE 25 MG/1
TABLET ORAL
Qty: 90 TABLET | Refills: 3 | Status: SHIPPED | OUTPATIENT
Start: 2021-09-21 | End: 2022-01-11 | Stop reason: SDUPTHER

## 2021-10-14 ENCOUNTER — TELEPHONE (OUTPATIENT)
Dept: FAMILY MEDICINE CLINIC | Facility: CLINIC | Age: 66
End: 2021-10-14

## 2021-10-14 ENCOUNTER — OFFICE VISIT (OUTPATIENT)
Dept: URGENT CARE | Facility: CLINIC | Age: 66
End: 2021-10-14
Payer: COMMERCIAL

## 2021-10-14 VITALS
WEIGHT: 180 LBS | TEMPERATURE: 97.3 F | OXYGEN SATURATION: 99 % | RESPIRATION RATE: 16 BRPM | HEART RATE: 68 BPM | DIASTOLIC BLOOD PRESSURE: 78 MMHG | SYSTOLIC BLOOD PRESSURE: 130 MMHG | BODY MASS INDEX: 32.92 KG/M2

## 2021-10-14 DIAGNOSIS — L05.91 PILONIDAL CYST: Primary | ICD-10-CM

## 2021-10-14 DIAGNOSIS — L05.91 CHRONIC RECURRENT PILONIDAL CYST: Primary | ICD-10-CM

## 2021-10-14 PROCEDURE — G0382 LEV 3 HOSP TYPE B ED VISIT: HCPCS | Performed by: PHYSICIAN ASSISTANT

## 2021-10-14 PROCEDURE — S9083 URGENT CARE CENTER GLOBAL: HCPCS | Performed by: PHYSICIAN ASSISTANT

## 2021-10-14 RX ORDER — SULFAMETHOXAZOLE AND TRIMETHOPRIM 800; 160 MG/1; MG/1
1 TABLET ORAL EVERY 12 HOURS SCHEDULED
Qty: 10 TABLET | Refills: 0 | Status: SHIPPED | OUTPATIENT
Start: 2021-10-14 | End: 2021-10-19

## 2021-10-20 ENCOUNTER — CONSULT (OUTPATIENT)
Dept: SURGERY | Facility: CLINIC | Age: 66
End: 2021-10-20
Payer: COMMERCIAL

## 2021-10-20 VITALS
BODY MASS INDEX: 33.13 KG/M2 | SYSTOLIC BLOOD PRESSURE: 138 MMHG | DIASTOLIC BLOOD PRESSURE: 86 MMHG | TEMPERATURE: 98.2 F | RESPIRATION RATE: 16 BRPM | HEIGHT: 62 IN | WEIGHT: 180 LBS | HEART RATE: 65 BPM

## 2021-10-20 DIAGNOSIS — K60.3 PERIANAL FISTULA: ICD-10-CM

## 2021-10-20 DIAGNOSIS — K61.1 PERIRECTAL ABSCESS: Primary | ICD-10-CM

## 2021-10-20 PROBLEM — K60.30 PERIANAL FISTULA: Status: ACTIVE | Noted: 2021-10-20

## 2021-10-20 PROCEDURE — 99204 OFFICE O/P NEW MOD 45 MIN: CPT | Performed by: STUDENT IN AN ORGANIZED HEALTH CARE EDUCATION/TRAINING PROGRAM

## 2021-12-30 ENCOUNTER — HOSPITAL ENCOUNTER (EMERGENCY)
Facility: HOSPITAL | Age: 66
Discharge: HOME | End: 2021-12-30
Attending: EMERGENCY MEDICINE | Admitting: EMERGENCY MEDICINE
Payer: OTHER MISCELLANEOUS

## 2021-12-30 VITALS
TEMPERATURE: 98.7 F | SYSTOLIC BLOOD PRESSURE: 191 MMHG | HEART RATE: 68 BPM | DIASTOLIC BLOOD PRESSURE: 91 MMHG | RESPIRATION RATE: 18 BRPM | OXYGEN SATURATION: 97 %

## 2021-12-30 DIAGNOSIS — S39.91XA BLUNT ABDOMINAL TRAUMA, INITIAL ENCOUNTER: Primary | ICD-10-CM

## 2021-12-30 PROCEDURE — 99283 EMERGENCY DEPT VISIT LOW MDM: CPT

## 2021-12-30 PROCEDURE — 99282 EMERGENCY DEPT VISIT SF MDM: CPT | Performed by: EMERGENCY MEDICINE

## 2021-12-30 NOTE — DISCHARGE INSTRUCTIONS
Thank you for coming to the emergency department  At this time, I do not believe any imaging is warranted for your acute blunt abdominal trauma  There is no bruising to the abdomen and there is no focal tenderness  If you experience any incidence of pain is not controlled with over-the-counter therapy such as Motrin and Tylenol, please return to the ER for evaluation  Please follow-up at the direction of your supervisor regarding workmen's Comp
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
## 2022-01-11 ENCOUNTER — OFFICE VISIT (OUTPATIENT)
Dept: FAMILY MEDICINE CLINIC | Facility: CLINIC | Age: 67
End: 2022-01-11
Payer: COMMERCIAL

## 2022-01-11 VITALS
HEIGHT: 62 IN | TEMPERATURE: 97.9 F | DIASTOLIC BLOOD PRESSURE: 84 MMHG | BODY MASS INDEX: 34.34 KG/M2 | HEART RATE: 90 BPM | WEIGHT: 186.6 LBS | OXYGEN SATURATION: 93 % | SYSTOLIC BLOOD PRESSURE: 140 MMHG

## 2022-01-11 DIAGNOSIS — E04.1 NONTOXIC UNINODULAR GOITER: ICD-10-CM

## 2022-01-11 DIAGNOSIS — Z12.31 ENCOUNTER FOR SCREENING MAMMOGRAM FOR MALIGNANT NEOPLASM OF BREAST: ICD-10-CM

## 2022-01-11 DIAGNOSIS — Z00.00 ANNUAL PHYSICAL EXAM: Primary | ICD-10-CM

## 2022-01-11 DIAGNOSIS — E78.2 MIXED HYPERLIPIDEMIA: ICD-10-CM

## 2022-01-11 DIAGNOSIS — I10 ESSENTIAL HYPERTENSION: ICD-10-CM

## 2022-01-11 DIAGNOSIS — J45.909 UNCOMPLICATED ASTHMA, UNSPECIFIED ASTHMA SEVERITY, UNSPECIFIED WHETHER PERSISTENT: ICD-10-CM

## 2022-01-11 DIAGNOSIS — E55.9 VITAMIN D DEFICIENCY: ICD-10-CM

## 2022-01-11 DIAGNOSIS — H93.A2 PULSATILE TINNITUS OF LEFT EAR: ICD-10-CM

## 2022-01-11 DIAGNOSIS — Z12.11 SCREENING FOR COLON CANCER: ICD-10-CM

## 2022-01-11 PROBLEM — K61.1 PERIRECTAL ABSCESS: Status: RESOLVED | Noted: 2021-10-20 | Resolved: 2022-01-11

## 2022-01-11 PROBLEM — K60.3 PERIANAL FISTULA: Status: RESOLVED | Noted: 2021-10-20 | Resolved: 2022-01-11

## 2022-01-11 PROBLEM — F43.0 ACUTE REACTION TO SITUATIONAL STRESS: Status: RESOLVED | Noted: 2021-08-31 | Resolved: 2022-01-11

## 2022-01-11 PROBLEM — K60.30 PERIANAL FISTULA: Status: RESOLVED | Noted: 2021-10-20 | Resolved: 2022-01-11

## 2022-01-11 PROCEDURE — 3008F BODY MASS INDEX DOCD: CPT | Performed by: NURSE PRACTITIONER

## 2022-01-11 PROCEDURE — 1036F TOBACCO NON-USER: CPT | Performed by: NURSE PRACTITIONER

## 2022-01-11 PROCEDURE — 99396 PREV VISIT EST AGE 40-64: CPT | Performed by: NURSE PRACTITIONER

## 2022-01-11 RX ORDER — ERGOCALCIFEROL 1.25 MG/1
50000 CAPSULE ORAL WEEKLY
Qty: 12 CAPSULE | Refills: 1 | Status: SHIPPED | OUTPATIENT
Start: 2022-01-11 | End: 2022-07-15

## 2022-01-11 RX ORDER — HYDROCHLOROTHIAZIDE 25 MG/1
25 TABLET ORAL DAILY
Qty: 90 TABLET | Refills: 3 | Status: SHIPPED | OUTPATIENT
Start: 2022-01-11 | End: 2022-05-06 | Stop reason: SDUPTHER

## 2022-01-11 NOTE — PROGRESS NOTES
Assessment/Plan:           Problem List Items Addressed This Visit        Endocrine    Nontoxic uninodular goiter    Relevant Orders    TSH, 3rd generation with Free T4 reflex       Respiratory    Asthma       Cardiovascular and Mediastinum    Essential hypertension    Relevant Orders    Comprehensive metabolic panel    CBC and differential       Other    Hyperlipidemia    Relevant Orders    Lipid Panel with Direct LDL reflex    BMI 34 0-34 9,adult    Vitamin D deficiency    Relevant Orders    Vitamin D 25 hydroxy      Other Visit Diagnoses     Screening for colon cancer    -  Primary    Relevant Orders    Ambulatory Referral to Gastroenterology            Subjective:      Patient ID: Alexandra Begum is a 61 y o  female  HPI    The following portions of the patient's history were reviewed and updated as appropriate:   She  has a past medical history of Chronic GERD (2/7/2018), Hypertension, and Trochanteric bursitis (10/21/2019)  She   Patient Active Problem List    Diagnosis Date Noted    Asthma 02/07/2018    Hyperlipidemia 02/07/2018    BMI 34 0-34 9,adult 02/07/2018    Vitamin D deficiency 02/07/2018    Nontoxic uninodular goiter 09/25/2014    Essential hypertension 09/02/2014     She  has a past surgical history that includes Laparoscopic gastric banding and Uterine fibroid surgery  Her family history includes Diabetes in her mother; Hypertension in her mother; No Known Problems in her father, half-sister, half-sister, maternal aunt, maternal aunt, maternal grandfather, maternal grandmother, paternal aunt, paternal aunt, paternal grandfather, paternal grandmother, paternal uncle, and paternal uncle  She  reports that she has never smoked  She has never used smokeless tobacco  She reports that she does not drink alcohol and does not use drugs    Current Outpatient Medications   Medication Sig Dispense Refill    hydrochlorothiazide (HYDRODIURIL) 25 mg tablet take 1 tablet by mouth daily 90 tablet 3  Nutritional Supplements (VITAMIN D BOOSTER PO) Take by mouth      ergocalciferol (VITAMIN D2) 50,000 units Take 1 capsule (50,000 Units total) by mouth once a week for 8 doses (Patient not taking: Reported on 1/11/2022 ) 8 capsule 0     No current facility-administered medications for this visit  She is allergic to aspirin       Review of Systems      Objective:      /84 (BP Location: Left arm, Patient Position: Sitting)   Pulse 90   Temp 97 9 °F (36 6 °C) (Temporal)   Ht 5' 2" (1 575 m)   Wt 84 6 kg (186 lb 9 6 oz)   SpO2 93%   BMI 34 13 kg/m²          Physical Exam

## 2022-01-11 NOTE — PROGRESS NOTES
[address] [address] [place] [address]    NAME: Mita Poe  AGE: 61 y o  SEX: female  : 1955     DATE: 2022     Assessment and Plan:     Problem List Items Addressed This Visit        Endocrine    Nontoxic uninodular goiter    Relevant Orders    TSH, 3rd generation with Free T4 reflex       Respiratory    Asthma       Cardiovascular and Mediastinum    Essential hypertension    Relevant Medications    hydrochlorothiazide (HYDRODIURIL) 25 mg tablet    Other Relevant Orders    Comprehensive metabolic panel    CBC and differential       Nervous and Auditory    Pulsatile tinnitus of left ear     Will update carotid US with her complaints of tinnitus          Relevant Orders    VAS carotid complete study       Other    Hyperlipidemia    Relevant Orders    Lipid Panel with Direct LDL reflex    BMI 34 0-34 9,adult    Vitamin D deficiency    Relevant Medications    ergocalciferol (VITAMIN D2) 50,000 units    Other Relevant Orders    Vitamin D 25 hydroxy    Annual physical exam - Primary    Encounter for screening mammogram for malignant neoplasm of breast    Relevant Orders    Mammo screening bilateral w 3d & cad    Screening for colon cancer    Relevant Orders    Ambulatory Referral to Gastroenterology          Immunizations and preventive care screenings were discussed with patient today  Appropriate education was printed on patient's after visit summary  Counseling:  Dental Health: discussed importance of regular tooth brushing, flossing, and dental visits  · Exercise: the importance of regular exercise/physical activity was discussed  Recommend exercise 3-5 times per week for at least 30 minutes  BMI Counseling: Body mass index is 34 13 kg/m²   The BMI is above normal  Nutrition recommendations include decreasing portion sizes, encouraging healthy choices of fruits and vegetables, decreasing fast food intake, consuming healthier snacks, limiting drinks that contain sugar, moderation in carbohydrate intake, increasing intake of lean protein, reducing intake of saturated and trans fat and reducing intake of cholesterol  Exercise recommendations include moderate physical activity 150 minutes/week  No pharmacotherapy was ordered  Patient referred to PCP  Rationale for BMI follow-up plan is due to patient being overweight or obese  Return in 1 year (on 1/11/2023)  Chief Complaint:     Chief Complaint   Patient presents with    Follow-up     needs lab slip      History of Present Illness:     Adult Annual Physical   Patient here for a comprehensive physical exam  The patient reports no problems  Patient does report that she is having left ear tinnitus and is having a pulsation in her left ear at night   Diet and Physical Activity  · Diet/Nutrition: well balanced diet  HX of gastric sleeve   · Exercise: walking  Depression Screening  PHQ-2/9 Depression Screening         General Health  · Sleep: sleeps well and gets 7-8 hours of sleep on average  · Hearing: normal - bilateral   · Vision: goes for regular eye exams, most recent eye exam <1 year ago and most recent eye exam >1 year ago  Right eye retina tear   · Dental: regular dental visits and brushes teeth twice daily  /GYN Health  · Patient is: postmenopausal  · Last menstrual period: 10 years   · Contraceptive method: none  Review of Systems:     Review of Systems   Constitutional: Negative for activity change, appetite change, chills, diaphoresis, fatigue, fever and unexpected weight change  HENT: Positive for tinnitus  Negative for congestion, ear pain, hearing loss, postnasal drip, sinus pressure, sinus pain, sneezing and sore throat  Eyes: Negative for pain, redness and visual disturbance  Respiratory: Negative for cough and shortness of breath  Cardiovascular: Negative for chest pain and leg swelling     Gastrointestinal: Negative for abdominal pain, diarrhea, nausea and vomiting  Endocrine: Negative  Genitourinary: Negative  Musculoskeletal: Negative for arthralgias  Allergic/Immunologic: Negative  Neurological: Negative for dizziness and light-headedness  Hematological: Negative  Psychiatric/Behavioral: Negative for behavioral problems and dysphoric mood        Past Medical History:     Past Medical History:   Diagnosis Date    Chronic GERD 2/7/2018    Hypertension     Trochanteric bursitis 10/21/2019      Past Surgical History:     Past Surgical History:   Procedure Laterality Date    LAPAROSCOPIC GASTRIC BANDING      UTERINE FIBROID SURGERY        Social History:     Social History     Socioeconomic History    Marital status: Single     Spouse name: None    Number of children: None    Years of education: None    Highest education level: None   Occupational History    None   Tobacco Use    Smoking status: Never Smoker    Smokeless tobacco: Never Used   Vaping Use    Vaping Use: Never used   Substance and Sexual Activity    Alcohol use: No    Drug use: No    Sexual activity: None   Other Topics Concern    None   Social History Narrative    Lives with boyfriend     [address] [facility]      Social Determinants of Health     Financial Resource Strain: Not on file   Food Insecurity: Not on file   Transportation Needs: Not on file   Physical Activity: Not on file   Stress: Not on file   Social Connections: Not on file   Intimate Partner Violence: Not on file   Housing Stability: Not on file      Family History:     Family History   Problem Relation Age of Onset    Hypertension Mother     Diabetes Mother     No Known Problems Father     No Known Problems Maternal Grandmother     No Known Problems Maternal Grandfather     No Known Problems Paternal Grandmother     No Known Problems Paternal Grandfather     No Known Problems Half-Sister     No Known Problems Half-Sister     No Known Problems Maternal Aunt     No Known Problems Maternal Aunt     No Known Problems Paternal Aunt     No Known Problems Paternal Aunt     No Known Problems Paternal Uncle     No Known Problems Paternal Uncle       Current Medications:     Current Outpatient Medications   Medication Sig Dispense Refill    hydrochlorothiazide (HYDRODIURIL) 25 mg tablet Take 1 tablet (25 mg total) by mouth daily 90 tablet 3    Nutritional Supplements (VITAMIN D BOOSTER PO) Take by mouth      ergocalciferol (VITAMIN D2) 50,000 units Take 1 capsule (50,000 Units total) by mouth once a week 12 capsule 1     No current facility-administered medications for this visit  Allergies: Allergies   Allergen Reactions    Aspirin Diarrhea and Vomiting      Physical Exam:     /84 (BP Location: Left arm, Patient Position: Sitting)   Pulse 90   Temp 97 9 °F (36 6 °C) (Temporal)   Ht 5' 2" (1 575 m)   Wt 84 6 kg (186 lb 9 6 oz)   SpO2 93%   BMI 34 13 kg/m²     Physical Exam  Vitals and nursing note reviewed  Constitutional:       General: She is not in acute distress  Appearance: She is well-developed  HENT:      Head: Normocephalic and atraumatic  Right Ear: Tympanic membrane normal       Left Ear: Tympanic membrane normal       Nose: Nose normal       Mouth/Throat:      Mouth: Mucous membranes are moist    Eyes:      Conjunctiva/sclera: Conjunctivae normal    Cardiovascular:      Rate and Rhythm: Normal rate and regular rhythm  Heart sounds: No murmur heard  Pulmonary:      Effort: Pulmonary effort is normal  No respiratory distress  Breath sounds: Normal breath sounds  Abdominal:      Palpations: Abdomen is soft  Tenderness: There is no abdominal tenderness  Musculoskeletal:         General: Normal range of motion  Cervical back: Neck supple  Skin:     General: Skin is warm and dry  Neurological:      General: No focal deficit present  Mental Status: She is alert and oriented to person, place, and time  Psychiatric:         Mood and Affect: Mood normal           Willian Claire, [address]

## 2022-01-11 NOTE — PATIENT INSTRUCTIONS

## 2022-02-01 ENCOUNTER — HOSPITAL ENCOUNTER (OUTPATIENT)
Dept: RADIOLOGY | Facility: MEDICAL CENTER | Age: 67
Discharge: HOME | End: 2022-02-01
Payer: COMMERCIAL

## 2022-02-01 DIAGNOSIS — H93.A2 PULSATILE TINNITUS OF LEFT EAR: ICD-10-CM

## 2022-02-01 PROCEDURE — 93880 EXTRACRANIAL BILAT STUDY: CPT

## 2022-02-01 PROCEDURE — 93880 EXTRACRANIAL BILAT STUDY: CPT | Performed by: SURGERY

## 2022-02-15 ENCOUNTER — OFFICE VISIT (OUTPATIENT)
Dept: GASTROENTEROLOGY | Facility: CLINIC | Age: 67
End: 2022-02-15
Payer: COMMERCIAL

## 2022-02-15 VITALS
BODY MASS INDEX: 34.04 KG/M2 | DIASTOLIC BLOOD PRESSURE: 90 MMHG | WEIGHT: 185 LBS | OXYGEN SATURATION: 99 % | HEIGHT: 62 IN | HEART RATE: 71 BPM | SYSTOLIC BLOOD PRESSURE: 148 MMHG

## 2022-02-15 DIAGNOSIS — Z12.11 SCREENING FOR COLON CANCER: ICD-10-CM

## 2022-02-15 PROCEDURE — 1036F TOBACCO NON-USER: CPT | Performed by: INTERNAL MEDICINE

## 2022-02-15 PROCEDURE — 99203 OFFICE O/P NEW LOW 30 MIN: CPT | Performed by: INTERNAL MEDICINE

## 2022-02-15 PROCEDURE — 3008F BODY MASS INDEX DOCD: CPT | Performed by: INTERNAL MEDICINE

## 2022-02-15 NOTE — LETTER
February 15, 2022     Danielle [facility] [address]    Patient: Luis Enrique Lowery   YOB: 1955   Date of Visit: 2/15/2022       Dear Dr Nessa Steven: Thank you for referring Luis Enrique Lowery to me for evaluation  Below are my notes for this consultation  If you have questions, please do not hesitate to call me  I look forward to following your patient along with you  Sincerely,        James Luke MD        CC: No Recipients  James Luke MD  2/15/2022  2:00 PM  [facility]    Dear Leslee Reynolds,     I had the pleasure of seeing your patient Luis Enrique Lowery in the office today and I thank you for this kind referral        Chief Complaint:  Need for screening      HPI:  Luis Enrique Lowery is a 74 y o  female who presents with need for average risk screening colonoscopy  Last colonoscopy more than 10 years ago  The patient denies any abdominal pain, change in bowel habits, change in stool caliber, melena, hematochezia, rectal bleeding, tenesmus, or weight loss  She has no family history of colon cancer  The patient denies any chest pain shortness of breath, dizziness, or any other symptomatology  She is not having any GERD symptomatology at present  She feels quite well  Review of Systems:   Constitutional: No fever or chills, feels well, no tiredness, no recent weight gain or weight loss  HENT: No complaints of earache, no hearing loss, no nosebleeds, no nasal discharge, no sore throat, no hoarseness  Eyes: No complaints of eye pain, no red eyes, no discharge from eyes, no itchy eyes  Cardiovascular: No complaints of slow heart rate, no fast heart rate, no chest pain, no palpitations, no leg claudication, no lower extremity edema  Respiratory: No complaints of shortness of breath, no wheezing, no cough, no SOB on exertion, no orthopnea     Gastrointestinal: As noted in HPI  Genitourinary: No complaints of dysuria, no incontinence, no hesitancy, no nocturia  Musculoskeletal: No complaints of arthralgia, no myalgias, no joint swelling or stiffness, no limb pain or swelling  Neurological: No complaints of headache, no confusion, no convulsions, no numbness or tingling, no dizziness or fainting, no limb weakness, no difficulty walking  Skin: No complaints of skin rash or skin lesions, no itching, no skin wound, no dry skin  Hematological/Lymphatic: No complaints of swollen glands, does not bleed easy  Allergic/Immunologic: No immunocompromised state  Endocrine:  No complaints of polyuria, no polydipsia  Psychiatric/Behavioral: is not suicidal, no sleep disturbances, no anxiety or depression, no change in personality, no emotional problems         Historical Information   Past Medical History:   Diagnosis Date    Chronic GERD 2/7/2018    Hypertension     Trochanteric bursitis 10/21/2019     Past Surgical History:   Procedure Laterality Date    LAPAROSCOPIC GASTRIC BANDING      UTERINE FIBROID SURGERY       Social History   Social History     Substance and Sexual Activity   Alcohol Use No     Social History     Substance and Sexual Activity   Drug Use No     Social History     Tobacco Use   Smoking Status Never Smoker   Smokeless Tobacco Never Used     Family History   Problem Relation Age of Onset    Hypertension Mother     Diabetes Mother     No Known Problems Father     No Known Problems Maternal Grandmother     No Known Problems Maternal Grandfather     No Known Problems Paternal Grandmother     No Known Problems Paternal Grandfather     No Known Problems Half-Sister     No Known Problems Half-Sister     No Known Problems Maternal Aunt     No Known Problems Maternal Aunt     No Known Problems Paternal Aunt     No Known Problems Paternal Aunt     No Known Problems Paternal Uncle     No Known Problems Paternal Uncle          Current Medications: has a current medication list which includes the following prescription(s): ergocalciferol, hydrochlorothiazide, and nutritional supplements  Vital Signs: /90   Pulse 71   Ht 5' 2" (1 575 m)   Wt 83 9 kg (185 lb)   SpO2 99%   BMI 33 84 kg/m²     Physical Exam:   Constitutional  General Appearance: No acute distress, well appearing and well nourished  Head  Normocephalic  Eyes  Conjunctivae and lids: No swelling, erythema, or discharge  Pupils and irises: Equal, round and reactive to light  Ears, Nose, Mouth, and Throat  External inspection of ears and nose: Normal  Nasal mucosa, septum and turbinates: Normal without edema or erythema/   Oropharynx: Normal with no erythema, edema, exudate or lesions  Neck  Normal range of motion  Neck supple  Cardiovascular  Auscultation of the heart: Normal rate and rhythm, normal S1 and S2 without murmurs  Examination of the extremities for edema and/or varicosities: Normal  Pulmonary/Chest  Respiratory effort: No increased work of breathing or signs of respiratory distress  Auscultation of lungs: Clear to auscultation, equal breath sounds bilaterally, no wheezes, rales, no rhonchi  Abdomen  Abdomen: Non-tender, no masses  Liver and spleen: No hepatomegaly or splenomegaly  Musculoskeletal  Gait and station: normal   Digits and Nails: normal without clubbing or cyanosis  Inspection/palpation of joints, bones, and muscles: Normal  Neurological  No nystagmus or asterixis  Skin  Skin and subcutaneous tissue: Normal without rashes or lesions  Lymphatic  Palpation of the lymph nodes in neck: No lymphadenopathy     Psychiatric  Orientation to person, place and time: Normal   Mood and affect: Normal          Labs:   Lab Results   Component Value Date    ALT 17 08/26/2021    AST 11 08/26/2021    BUN 12 08/26/2021    CALCIUM 9 2 08/26/2021     08/26/2021    CO2 31 08/26/2021    CREATININE 0 84 08/26/2021    HDL 87 08/26/2021    HCT 43 8 08/26/2021    HGB 14 4 08/26/2021 HGBA1C 4 9 09/03/2019     08/26/2021    K 3 8 08/26/2021    TRIG 76 08/26/2021    WBC 8 10 08/26/2021         X-Rays & Procedures:   No orders to display         ______________________________________________________________________      Assessment & Plan:      Diagnoses and all orders for this visit:    Screening for colon cancer  -     Ambulatory Referral to Gastroenterology  -     Colonoscopy; Future          I have taken liberty of scheduling the patient for colonoscopy  I will be happy to inform you of her results and further recommendations      I Would like to thank you for allowing me to participate care          With warmest regards,    Gabi Ruth MD, [facility]

## 2022-02-15 NOTE — PROGRESS NOTES
[facility]    Dear Tapioca Mobile,     I had the pleasure of seeing your patient Kandice Paz in the office today and I thank you for this kind referral        Chief Complaint:  Need for screening      HPI:  Kandice Paz is a 74 y o  female who presents with need for average risk screening colonoscopy  Last colonoscopy more than 10 years ago  The patient denies any abdominal pain, change in bowel habits, change in stool caliber, melena, hematochezia, rectal bleeding, tenesmus, or weight loss  She has no family history of colon cancer  The patient denies any chest pain shortness of breath, dizziness, or any other symptomatology  She is not having any GERD symptomatology at present  She feels quite well  Review of Systems:   Constitutional: No fever or chills, feels well, no tiredness, no recent weight gain or weight loss  HENT: No complaints of earache, no hearing loss, no nosebleeds, no nasal discharge, no sore throat, no hoarseness  Eyes: No complaints of eye pain, no red eyes, no discharge from eyes, no itchy eyes  Cardiovascular: No complaints of slow heart rate, no fast heart rate, no chest pain, no palpitations, no leg claudication, no lower extremity edema  Respiratory: No complaints of shortness of breath, no wheezing, no cough, no SOB on exertion, no orthopnea  Gastrointestinal: As noted in HPI  Genitourinary: No complaints of dysuria, no incontinence, no hesitancy, no nocturia  Musculoskeletal: No complaints of arthralgia, no myalgias, no joint swelling or stiffness, no limb pain or swelling  Neurological: No complaints of headache, no confusion, no convulsions, no numbness or tingling, no dizziness or fainting, no limb weakness, no difficulty walking  Skin: No complaints of skin rash or skin lesions, no itching, no skin wound, no dry skin  Hematological/Lymphatic: No complaints of swollen glands, does not bleed easy     Allergic/Immunologic: No immunocompromised state  Endocrine:  No complaints of polyuria, no polydipsia  Psychiatric/Behavioral: is not suicidal, no sleep disturbances, no anxiety or depression, no change in personality, no emotional problems  Historical Information   Past Medical History:   Diagnosis Date    Chronic GERD 2/7/2018    Hypertension     Trochanteric bursitis 10/21/2019     Past Surgical History:   Procedure Laterality Date    LAPAROSCOPIC GASTRIC BANDING      UTERINE FIBROID SURGERY       Social History   Social History     Substance and Sexual Activity   Alcohol Use No     Social History     Substance and Sexual Activity   Drug Use No     Social History     Tobacco Use   Smoking Status Never Smoker   Smokeless Tobacco Never Used     Family History   Problem Relation Age of Onset    Hypertension Mother     Diabetes Mother     No Known Problems Father     No Known Problems Maternal Grandmother     No Known Problems Maternal Grandfather     No Known Problems Paternal Grandmother     No Known Problems Paternal Grandfather     No Known Problems Half-Sister     No Known Problems Half-Sister     No Known Problems Maternal Aunt     No Known Problems Maternal Aunt     No Known Problems Paternal Aunt     No Known Problems Paternal Aunt     No Known Problems Paternal Uncle     No Known Problems Paternal Uncle          Current Medications: has a current medication list which includes the following prescription(s): ergocalciferol, hydrochlorothiazide, and nutritional supplements  Vital Signs: /90   Pulse 71   Ht 5' 2" (1 575 m)   Wt 83 9 kg (185 lb)   SpO2 99%   BMI 33 84 kg/m²     Physical Exam:   Constitutional  General Appearance: No acute distress, well appearing and well nourished  Head  Normocephalic  Eyes  Conjunctivae and lids: No swelling, erythema, or discharge  Pupils and irises: Equal, round and reactive to light     Ears, Nose, Mouth, and Throat  External inspection of ears and nose: Normal  Nasal mucosa, septum and turbinates: Normal without edema or erythema/   Oropharynx: Normal with no erythema, edema, exudate or lesions  Neck  Normal range of motion  Neck supple  Cardiovascular  Auscultation of the heart: Normal rate and rhythm, normal S1 and S2 without murmurs  Examination of the extremities for edema and/or varicosities: Normal  Pulmonary/Chest  Respiratory effort: No increased work of breathing or signs of respiratory distress  Auscultation of lungs: Clear to auscultation, equal breath sounds bilaterally, no wheezes, rales, no rhonchi  Abdomen  Abdomen: Non-tender, no masses  Liver and spleen: No hepatomegaly or splenomegaly  Musculoskeletal  Gait and station: normal   Digits and Nails: normal without clubbing or cyanosis  Inspection/palpation of joints, bones, and muscles: Normal  Neurological  No nystagmus or asterixis  Skin  Skin and subcutaneous tissue: Normal without rashes or lesions  Lymphatic  Palpation of the lymph nodes in neck: No lymphadenopathy  Psychiatric  Orientation to person, place and time: Normal   Mood and affect: Normal          Labs:   Lab Results   Component Value Date    ALT 17 08/26/2021    AST 11 08/26/2021    BUN 12 08/26/2021    CALCIUM 9 2 08/26/2021     08/26/2021    CO2 31 08/26/2021    CREATININE 0 84 08/26/2021    HDL 87 08/26/2021    HCT 43 8 08/26/2021    HGB 14 4 08/26/2021    HGBA1C 4 9 09/03/2019     08/26/2021    K 3 8 08/26/2021    TRIG 76 08/26/2021    WBC 8 10 08/26/2021         X-Rays & Procedures:   No orders to display         ______________________________________________________________________      Assessment & Plan:      Diagnoses and all orders for this visit:    Screening for colon cancer  -     Ambulatory Referral to Gastroenterology  -     Colonoscopy; Future          I have taken liberty of scheduling the patient for colonoscopy    I will be happy to inform you of her results and further recommendations      I Would like to thank you for allowing me to participate care          With warmest regards,    Les Gould MD, [facility]

## 2022-02-15 NOTE — PATIENT INSTRUCTIONS
Scheduled date of colonoscopy (as of today):2/22  Physician performing colonoscopy: Carmencita Huber   Location of colonoscopy:[place]  Bowel prep reviewed with patient:MIRALAX  Instructions reviewed with patient by:ANGELO  Clearances:

## 2022-02-16 ENCOUNTER — TELEPHONE (OUTPATIENT)
Dept: GASTROENTEROLOGY | Facility: CLINIC | Age: 67
End: 2022-02-16

## 2022-04-05 ENCOUNTER — HOSPITAL ENCOUNTER (OUTPATIENT)
Dept: GASTROENTEROLOGY | Facility: HOSPITAL | Age: 67
Setting detail: OUTPATIENT SURGERY
Discharge: HOME | End: 2022-04-05
Attending: INTERNAL MEDICINE
Payer: COMMERCIAL

## 2022-04-05 ENCOUNTER — ANESTHESIA (OUTPATIENT)
Dept: GASTROENTEROLOGY | Facility: HOSPITAL | Age: 67
End: 2022-04-05

## 2022-04-05 ENCOUNTER — ANESTHESIA EVENT (OUTPATIENT)
Dept: GASTROENTEROLOGY | Facility: HOSPITAL | Age: 67
End: 2022-04-05

## 2022-04-05 VITALS
RESPIRATION RATE: 18 BRPM | BODY MASS INDEX: 34.14 KG/M2 | TEMPERATURE: 98.1 F | HEART RATE: 65 BPM | WEIGHT: 192.68 LBS | HEIGHT: 63 IN | OXYGEN SATURATION: 100 % | DIASTOLIC BLOOD PRESSURE: 77 MMHG | SYSTOLIC BLOOD PRESSURE: 150 MMHG

## 2022-04-05 DIAGNOSIS — Z12.11 SCREENING FOR COLON CANCER: ICD-10-CM

## 2022-04-05 PROCEDURE — G0121 COLON CA SCRN NOT HI RSK IND: HCPCS | Performed by: INTERNAL MEDICINE

## 2022-04-05 RX ORDER — SODIUM CHLORIDE, SODIUM LACTATE, POTASSIUM CHLORIDE, CALCIUM CHLORIDE 600; 310; 30; 20 MG/100ML; MG/100ML; MG/100ML; MG/100ML
INJECTION, SOLUTION INTRAVENOUS CONTINUOUS PRN
Status: DISCONTINUED | OUTPATIENT
Start: 2022-04-05 | End: 2022-04-05

## 2022-04-05 RX ORDER — SODIUM CHLORIDE, SODIUM LACTATE, POTASSIUM CHLORIDE, CALCIUM CHLORIDE 600; 310; 30; 20 MG/100ML; MG/100ML; MG/100ML; MG/100ML
20 INJECTION, SOLUTION INTRAVENOUS CONTINUOUS
Status: CANCELLED | OUTPATIENT
Start: 2022-04-05

## 2022-04-05 RX ORDER — SODIUM CHLORIDE, SODIUM LACTATE, POTASSIUM CHLORIDE, CALCIUM CHLORIDE 600; 310; 30; 20 MG/100ML; MG/100ML; MG/100ML; MG/100ML
125 INJECTION, SOLUTION INTRAVENOUS CONTINUOUS
Status: DISCONTINUED | OUTPATIENT
Start: 2022-04-05 | End: 2022-04-09 | Stop reason: HOSPADM

## 2022-04-05 RX ORDER — PROPOFOL 10 MG/ML
INJECTION, EMULSION INTRAVENOUS AS NEEDED
Status: DISCONTINUED | OUTPATIENT
Start: 2022-04-05 | End: 2022-04-05

## 2022-04-05 RX ADMIN — PROPOFOL 100 MG: 10 INJECTION, EMULSION INTRAVENOUS at 08:03

## 2022-04-05 RX ADMIN — PROPOFOL 50 MG: 10 INJECTION, EMULSION INTRAVENOUS at 08:14

## 2022-04-05 RX ADMIN — SODIUM CHLORIDE, POTASSIUM CHLORIDE, SODIUM LACTATE AND CALCIUM CHLORIDE 125 ML/HR: 600; 310; 30; 20 INJECTION, SOLUTION INTRAVENOUS at 07:29

## 2022-04-05 RX ADMIN — PROPOFOL 50 MG: 10 INJECTION, EMULSION INTRAVENOUS at 08:07

## 2022-04-05 RX ADMIN — SODIUM CHLORIDE, SODIUM LACTATE, POTASSIUM CHLORIDE, AND CALCIUM CHLORIDE: .6; .31; .03; .02 INJECTION, SOLUTION INTRAVENOUS at 08:00

## 2022-04-05 NOTE — ANESTHESIA POSTPROCEDURE EVALUATION
Post-Op Assessment Note    CV Status:  Stable  Pain Score: 0    Pain management: adequate     Mental Status:  Alert and awake   Hydration Status:  Euvolemic   PONV Controlled:  Controlled   Airway Patency:  Patent      Post Op Vitals Reviewed: Yes      Staff: CRNA         No complications documented      BP   126/56   Temp   97 6   Pulse  68   Resp   14   SpO2   99

## 2022-04-05 NOTE — H&P
History and Physical -  [facility]  Shyla Rushing 74 y o  female MRN: [id number]                  HPI: Shyla Rushing is a 74y o  year old female who presents for colonoscopy for average risk screening  Last colonoscopy more than 10 years ago      REVIEW OF SYSTEMS: Per the HPI, and otherwise unremarkable  Historical Information   Past Medical History:   Diagnosis Date    Chronic GERD 2/7/2018    Hypertension     Trochanteric bursitis 10/21/2019     Past Surgical History:   Procedure Laterality Date    LAPAROSCOPIC GASTRIC BANDING      UTERINE FIBROID SURGERY       Social History   Social History     Substance and Sexual Activity   Alcohol Use No     Social History     Substance and Sexual Activity   Drug Use No     Social History     Tobacco Use   Smoking Status Never Smoker   Smokeless Tobacco Never Used     Family History   Problem Relation Age of Onset    Hypertension Mother     Diabetes Mother     No Known Problems Father     No Known Problems Maternal Grandmother     No Known Problems Maternal Grandfather     No Known Problems Paternal Grandmother     No Known Problems Paternal Grandfather     No Known Problems Half-Sister     No Known Problems Half-Sister     No Known Problems Maternal Aunt     No Known Problems Maternal Aunt     No Known Problems Paternal Aunt     No Known Problems Paternal Aunt     No Known Problems Paternal Uncle     No Known Problems Paternal Uncle        Meds/Allergies     (Not in a hospital admission)      Allergies   Allergen Reactions    Aspirin Diarrhea and Vomiting       Objective     There were no vitals taken for this visit        PHYSICAL EXAM    Gen: NAD  CV: RRR  CHEST: Clear  ABD: soft, NT/ND  EXT: no edema  Neuro: AAO      ASSESSMENT/PLAN:  This is a 74y o  year old female here for average risk screening    PLAN:   Procedure:  Colonoscopy

## 2022-04-05 NOTE — INTERVAL H&P NOTE
H&P reviewed  After examining the patient I find no changes in the patients condition since the H&P had been written      Vitals:    04/05/22 0714   BP: 170/82   Pulse: 70   Resp: 16   Temp: 97 5 °F (36 4 °C)   SpO2: 99%

## 2022-04-05 NOTE — ANESTHESIA PREPROCEDURE EVALUATION
Procedure:  COLONOSCOPY    Relevant Problems   CARDIO   (+) Essential hypertension   (+) Hyperlipidemia      PULMONARY   (+) Asthma        Physical Exam    Airway    Mallampati score: II  TM Distance: >3 FB  Neck ROM: full     Dental   No notable dental hx     Cardiovascular      Pulmonary      Other Findings        Anesthesia Plan  ASA Score- 2     Anesthesia Type- IV sedation with anesthesia with ASA Monitors  Additional Monitors:   Airway Plan:           Plan Factors-Exercise tolerance (METS): >4 METS  Chart reviewed  Existing labs reviewed  Patient summary reviewed  Patient is not a current smoker  Induction- intravenous  Postoperative Plan-     Informed Consent- Anesthetic plan and risks discussed with patient  I personally reviewed this patient with the CRNA  Discussed and agreed on the Anesthesia Plan with the CRNA  Sahra Bustamante

## 2022-05-06 PROBLEM — M21.611 BUNION, RIGHT: Status: ACTIVE | Noted: 2022-05-06

## 2022-05-16 ENCOUNTER — APPOINTMENT (OUTPATIENT)
Dept: LAB | Facility: CLINIC | Age: 67
End: 2022-05-16
Payer: COMMERCIAL

## 2022-05-16 DIAGNOSIS — K61.1 PERIRECTAL ABSCESS: ICD-10-CM

## 2022-05-16 DIAGNOSIS — E04.1 NONTOXIC UNINODULAR GOITER: ICD-10-CM

## 2022-05-16 DIAGNOSIS — E55.9 VITAMIN D DEFICIENCY: ICD-10-CM

## 2022-05-16 DIAGNOSIS — K60.3 PERIANAL FISTULA: ICD-10-CM

## 2022-05-16 DIAGNOSIS — I10 ESSENTIAL HYPERTENSION: ICD-10-CM

## 2022-05-16 DIAGNOSIS — E78.2 MIXED HYPERLIPIDEMIA: ICD-10-CM

## 2022-05-16 LAB
25(OH)D3 SERPL-MCNC: 34.1 NG/ML (ref 30–100)
ALBUMIN SERPL BCP-MCNC: 3.4 G/DL (ref 3.5–5)
ALP SERPL-CCNC: 109 U/L (ref 46–116)
ALT SERPL W P-5'-P-CCNC: 28 U/L (ref 12–78)
ANION GAP SERPL CALCULATED.3IONS-SCNC: 7 MMOL/L (ref 4–13)
AST SERPL W P-5'-P-CCNC: 21 U/L (ref 5–45)
BASOPHILS # BLD AUTO: 0.04 THOUSANDS/ΜL (ref 0–0.1)
BASOPHILS NFR BLD AUTO: 1 % (ref 0–1)
BILIRUB SERPL-MCNC: 0.84 MG/DL (ref 0.2–1)
BUN SERPL-MCNC: 10 MG/DL (ref 5–25)
CALCIUM ALBUM COR SERPL-MCNC: 9.2 MG/DL (ref 8.3–10.1)
CALCIUM SERPL-MCNC: 8.7 MG/DL (ref 8.3–10.1)
CHLORIDE SERPL-SCNC: 107 MMOL/L (ref 100–108)
CHOLEST SERPL-MCNC: 188 MG/DL
CO2 SERPL-SCNC: 27 MMOL/L (ref 21–32)
CREAT SERPL-MCNC: 0.85 MG/DL (ref 0.6–1.3)
EOSINOPHIL # BLD AUTO: 0.14 THOUSAND/ΜL (ref 0–0.61)
EOSINOPHIL NFR BLD AUTO: 2 % (ref 0–6)
ERYTHROCYTE [DISTWIDTH] IN BLOOD BY AUTOMATED COUNT: 12.9 % (ref 11.6–15.1)
GFR SERPL CREATININE-BSD FRML MDRD: 72 ML/MIN/1.73SQ M
GLUCOSE P FAST SERPL-MCNC: 87 MG/DL (ref 65–99)
HCT VFR BLD AUTO: 41.6 % (ref 34.8–46.1)
HDLC SERPL-MCNC: 72 MG/DL
HGB BLD-MCNC: 13.7 G/DL (ref 11.5–15.4)
IMM GRANULOCYTES # BLD AUTO: 0.02 THOUSAND/UL (ref 0–0.2)
IMM GRANULOCYTES NFR BLD AUTO: 0 % (ref 0–2)
LDLC SERPL CALC-MCNC: 98 MG/DL (ref 0–100)
LYMPHOCYTES # BLD AUTO: 2.22 THOUSANDS/ΜL (ref 0.6–4.47)
LYMPHOCYTES NFR BLD AUTO: 28 % (ref 14–44)
MCH RBC QN AUTO: 32.7 PG (ref 26.8–34.3)
MCHC RBC AUTO-ENTMCNC: 32.9 G/DL (ref 31.4–37.4)
MCV RBC AUTO: 99 FL (ref 82–98)
MONOCYTES # BLD AUTO: 0.29 THOUSAND/ΜL (ref 0.17–1.22)
MONOCYTES NFR BLD AUTO: 4 % (ref 4–12)
NEUTROPHILS # BLD AUTO: 5.23 THOUSANDS/ΜL (ref 1.85–7.62)
NEUTS SEG NFR BLD AUTO: 65 % (ref 43–75)
NRBC BLD AUTO-RTO: 0 /100 WBCS
PLATELET # BLD AUTO: 323 THOUSANDS/UL (ref 149–390)
PMV BLD AUTO: 10.5 FL (ref 8.9–12.7)
POTASSIUM SERPL-SCNC: 3.3 MMOL/L (ref 3.5–5.3)
PROT SERPL-MCNC: 7.5 G/DL (ref 6.4–8.2)
RBC # BLD AUTO: 4.19 MILLION/UL (ref 3.81–5.12)
SODIUM SERPL-SCNC: 141 MMOL/L (ref 136–145)
TRIGL SERPL-MCNC: 89 MG/DL
TSH SERPL DL<=0.05 MIU/L-ACNC: 0.63 UIU/ML (ref 0.45–4.5)
WBC # BLD AUTO: 7.94 THOUSAND/UL (ref 4.31–10.16)

## 2022-05-16 PROCEDURE — 80053 COMPREHEN METABOLIC PANEL: CPT

## 2022-05-16 PROCEDURE — 82306 VITAMIN D 25 HYDROXY: CPT

## 2022-05-16 PROCEDURE — 84443 ASSAY THYROID STIM HORMONE: CPT

## 2022-05-16 PROCEDURE — 85025 COMPLETE CBC W/AUTO DIFF WBC: CPT

## 2022-05-16 PROCEDURE — 80061 LIPID PANEL: CPT

## 2022-05-16 PROCEDURE — 36415 COLL VENOUS BLD VENIPUNCTURE: CPT

## 2022-06-08 ENCOUNTER — OFFICE VISIT (OUTPATIENT)
Dept: PODIATRY | Facility: CLINIC | Age: 67
End: 2022-06-08
Payer: COMMERCIAL

## 2022-06-08 ENCOUNTER — PREP FOR PROCEDURE (OUTPATIENT)
Dept: PODIATRY | Facility: CLINIC | Age: 67
End: 2022-06-08

## 2022-06-08 ENCOUNTER — APPOINTMENT (OUTPATIENT)
Dept: RADIOLOGY | Facility: CLINIC | Age: 67
End: 2022-06-08
Payer: COMMERCIAL

## 2022-06-08 VITALS
BODY MASS INDEX: 34.02 KG/M2 | DIASTOLIC BLOOD PRESSURE: 84 MMHG | HEIGHT: 63 IN | WEIGHT: 192 LBS | HEART RATE: 84 BPM | SYSTOLIC BLOOD PRESSURE: 148 MMHG

## 2022-06-08 DIAGNOSIS — M21.619 BUNION: ICD-10-CM

## 2022-06-08 DIAGNOSIS — M21.611 BUNION, RIGHT: ICD-10-CM

## 2022-06-08 DIAGNOSIS — M21.619 BUNION: Primary | ICD-10-CM

## 2022-06-08 PROCEDURE — 99244 OFF/OP CNSLTJ NEW/EST MOD 40: CPT | Performed by: PODIATRIST

## 2022-06-08 PROCEDURE — 73630 X-RAY EXAM OF FOOT: CPT

## 2022-06-08 PROCEDURE — 1036F TOBACCO NON-USER: CPT | Performed by: PODIATRIST

## 2022-06-08 PROCEDURE — 3008F BODY MASS INDEX DOCD: CPT | Performed by: PODIATRIST

## 2022-06-08 RX ORDER — CEFAZOLIN SODIUM 2 G/50ML
2000 SOLUTION INTRAVENOUS ONCE
Status: CANCELLED | OUTPATIENT
Start: 2022-07-22 | End: 2022-06-08

## 2022-06-08 RX ORDER — CHLORHEXIDINE GLUCONATE 0.12 MG/ML
15 RINSE ORAL ONCE
Status: CANCELLED | OUTPATIENT
Start: 2022-07-22 | End: 2022-06-08

## 2022-06-08 NOTE — PROGRESS NOTES
HISTORY AND PHYSICAL EXAM  - [facility]    PATIENT:  Wilner Reyes    1955      Assessment/Plan     Problem List Items Addressed This Visit        Musculoskeletal and Integument    Bunion, right    Relevant Orders    X-ray foot right 3+ views      Other Visit Diagnoses     Bunion    -  Primary    Relevant Orders    X-ray foot right 3+ views    X-ray foot left 3+ views           Diagnoses and all orders for this visit:    Bunion  -     X-ray foot right 3+ views; Future  -     X-ray foot left 3+ views; Future    Bunion, right  -     Ambulatory Referral to Podiatry  -     X-ray foot right 3+ views; Future     - x-rays three views taken reviewed bilateral feet and show slight decreased medial longitudinal arch bilaterally with plantar and posterior calcaneal spurring  There is also significant IM angle increase 1-2 bilateral feet with the right being worse  There is significant arthritic change in 6 to change the bilateral 1st metatarsal head   -will plan for right 1st MTPJ fusion, discussed the risks, and we will plan for this due to decrease the risk of recurrence with her significant metatarsus adductus    Patient was counseled and educated on the condition and the diagnosis  The diagnosis, treatment options and prognosis were discussed with the patient  The patient failed conservative care at this time and wished to proceed with surgical treatment  Explained surgical details and post-op course  Discussed all risks and complications related to the patient's condition and surgery  The benefits of surgery were also discussed  The patient understood that the surgery would not guarantee desirable outcome  All questions and concerns were addressed and the consent was signed     -she has failed conservative care with attempting wider shoes anti-inflammatories and preventing her shoes from rubbing      History of Present Illness   Wilner Reyes is a 74 y o  female who presents with pain in her bilateral feet for duration of multiple years, she is tried wider shoes shoe gear changes and anti-inflammatories  She is having significant bump pain  She works in acute rehab, and is on her feet all the time and is having significant pain at work and inhibition of her active daily living  She would like to have this corrected       Review of Systems   Constitutional: Negative for chills and fever  HENT: Negative for ear pain and sore throat  Eyes: Negative for pain and visual disturbance  Respiratory: Negative for cough and shortness of breath  Cardiovascular: Negative for chest pain and palpitations  Gastrointestinal: Negative for abdominal pain and vomiting  Genitourinary: Negative for dysuria and hematuria  Musculoskeletal: Negative for arthralgias and back pain  Skin: Negative for color change and rash  Neurological: Negative for seizures and syncope  All other systems reviewed and are negative  Historical Information   Past Medical History:   Diagnosis Date    Chronic GERD 2/7/2018    Hypertension     Trochanteric bursitis 10/21/2019     Past Surgical History:   Procedure Laterality Date    LAPAROSCOPIC GASTRIC BANDING      UTERINE FIBROID SURGERY       Social History   Social History     Substance and Sexual Activity   Alcohol Use No     Social History     Substance and Sexual Activity   Drug Use No     Social History     Tobacco Use   Smoking Status Never Smoker   Smokeless Tobacco Never Used     Family History: non-contributory    Meds/Allergies   all medications and allergies reviewed  Allergies   Allergen Reactions    Aspirin Diarrhea and Vomiting       Objective   Vitals: Blood pressure 148/84, pulse 84, height 5' 3" (1 6 m), weight 87 1 kg (192 lb)  ,Body mass index is 34 01 kg/m²  Physical Exam  Vitals reviewed  Constitutional:       Appearance: Normal appearance  She is obese  HENT:      Head: Normocephalic and atraumatic        Nose: Nose normal  Mouth/Throat:      Mouth: Mucous membranes are moist    Eyes:      Pupils: Pupils are equal, round, and reactive to light  Cardiovascular:      Pulses: Normal pulses  Pulmonary:      Effort: Pulmonary effort is normal    Musculoskeletal:      Cervical back: Normal range of motion  Comments: There is significant bunion formation bilateral feet with erythema overlying the dorsal medial eminence of bilateral feet  There is significant metatarsus adductus to bilateral feet  There is hypermobility to bilateral 1st rays  With minimal crepitus with range of motion of bilateral hallux joints  There is pain with palpation overlying the dorsal medial eminence, but none at in range of joint bilaterally   Skin:     Capillary Refill: Capillary refill takes less than 2 seconds  Neurological:      General: No focal deficit present  Mental Status: She is alert and oriented to person, place, and time           Ortho Exam

## 2022-06-08 NOTE — LETTER
July 25, 2022     Patient: Maxime Chi  YOB: 1955  Date of Visit: 7/22/2022       To Whom it May Concern:    Maxime Chi is under my professional care  Lindalee Phoenix was seen in my office on 6/8/2022  Lindalee Phoenix underwent surgery on her right foot on 7/22/2022 and is to be nonweightbearing on that foot at least 6 weeks  She is not to return to work until cleared by me  Her post-operative appointment is scheduled for 7/29/2022  If you have any questions or concerns, please don't hesitate to call           Sincerely,          Osiris Hampton DPM        CC: [place]

## 2022-06-08 NOTE — LETTER
June 13, 2022     Ester Bill, [address] [address]    Patient: Garcia Bray   YOB: 1955   Date of Visit: 6/8/2022       Dear Dr Keeley Villarreal: Thank you for referring Garcia Bray to me for evaluation  Below are my notes for this consultation  If you have questions, please do not hesitate to call me  I look forward to following your patient along with you  Sincerely,        Be Feliciano DPM        CC: No Recipients  Marvin Lopez  6/8/2022 10:17 AM  Signed      HISTORY AND PHYSICAL EXAM  - [facility]    PATIENT:  Garcia Bray    1955      Assessment/Plan     Problem List Items Addressed This Visit        Musculoskeletal and Integument    Bunion, right    Relevant Orders    X-ray foot right 3+ views      Other Visit Diagnoses     Bunion    -  Primary    Relevant Orders    X-ray foot right 3+ views    X-ray foot left 3+ views           Diagnoses and all orders for this visit:    Bunion  -     X-ray foot right 3+ views; Future  -     X-ray foot left 3+ views; Future    Bunion, right  -     Ambulatory Referral to Podiatry  -     X-ray foot right 3+ views; Future     - x-rays three views taken reviewed bilateral feet and show slight decreased medial longitudinal arch bilaterally with plantar and posterior calcaneal spurring  There is also significant IM angle increase 1-2 bilateral feet with the right being worse  There is significant arthritic change in 6 to change the bilateral 1st metatarsal head   -will plan for right 1st MTPJ fusion, discussed the risks, and we will plan for this due to decrease the risk of recurrence with her significant metatarsus adductus    Patient was counseled and educated on the condition and the diagnosis  The diagnosis, treatment options and prognosis were discussed with the patient    The patient failed conservative care at this time and wished to proceed with surgical treatment  Explained surgical details and post-op course  Discussed all risks and complications related to the patient's condition and surgery  The benefits of surgery were also discussed  The patient understood that the surgery would not guarantee desirable outcome  All questions and concerns were addressed and the consent was signed     -she has failed conservative care with attempting wider shoes anti-inflammatories and preventing her shoes from rubbing  History of Present Illness   Viridiana Mcfarlane is a 74 y o  female who presents with pain in her bilateral feet for duration of multiple years, she is tried wider shoes shoe gear changes and anti-inflammatories  She is having significant bump pain  She works in acute rehab, and is on her feet all the time and is having significant pain at work and inhibition of her active daily living  She would like to have this corrected       Review of Systems   Constitutional: Negative for chills and fever  HENT: Negative for ear pain and sore throat  Eyes: Negative for pain and visual disturbance  Respiratory: Negative for cough and shortness of breath  Cardiovascular: Negative for chest pain and palpitations  Gastrointestinal: Negative for abdominal pain and vomiting  Genitourinary: Negative for dysuria and hematuria  Musculoskeletal: Negative for arthralgias and back pain  Skin: Negative for color change and rash  Neurological: Negative for seizures and syncope  All other systems reviewed and are negative        Historical Information   Past Medical History:   Diagnosis Date    Chronic GERD 2/7/2018    Hypertension     Trochanteric bursitis 10/21/2019     Past Surgical History:   Procedure Laterality Date    LAPAROSCOPIC GASTRIC BANDING      UTERINE FIBROID SURGERY       Social History   Social History     Substance and Sexual Activity   Alcohol Use No     Social History     Substance and Sexual Activity   Drug Use No     Social History     Tobacco Use   Smoking Status Never Smoker   Smokeless Tobacco Never Used     Family History: non-contributory    Meds/Allergies   all medications and allergies reviewed  Allergies   Allergen Reactions    Aspirin Diarrhea and Vomiting       Objective   Vitals: Blood pressure 148/84, pulse 84, height 5' 3" (1 6 m), weight 87 1 kg (192 lb)  ,Body mass index is 34 01 kg/m²  Physical Exam  Vitals reviewed  Constitutional:       Appearance: Normal appearance  She is obese  HENT:      Head: Normocephalic and atraumatic  Nose: Nose normal       Mouth/Throat:      Mouth: Mucous membranes are moist    Eyes:      Pupils: Pupils are equal, round, and reactive to light  Cardiovascular:      Pulses: Normal pulses  Pulmonary:      Effort: Pulmonary effort is normal    Musculoskeletal:      Cervical back: Normal range of motion  Comments: There is significant bunion formation bilateral feet with erythema overlying the dorsal medial eminence of bilateral feet  There is significant metatarsus adductus to bilateral feet  There is hypermobility to bilateral 1st rays  With minimal crepitus with range of motion of bilateral hallux joints  There is pain with palpation overlying the dorsal medial eminence, but none at in range of joint bilaterally   Skin:     Capillary Refill: Capillary refill takes less than 2 seconds  Neurological:      General: No focal deficit present  Mental Status: She is alert and oriented to person, place, and time           Ortho Exam

## 2022-06-13 ENCOUNTER — HOSPITAL ENCOUNTER (OUTPATIENT)
Dept: RADIOLOGY | Facility: MEDICAL CENTER | Age: 67
Discharge: HOME | End: 2022-06-13
Payer: COMMERCIAL

## 2022-06-13 ENCOUNTER — APPOINTMENT (OUTPATIENT)
Dept: RADIOLOGY | Facility: MEDICAL CENTER | Age: 67
End: 2022-06-13
Payer: COMMERCIAL

## 2022-06-13 VITALS — WEIGHT: 189 LBS | BODY MASS INDEX: 33.49 KG/M2 | HEIGHT: 63 IN

## 2022-06-13 DIAGNOSIS — Z12.31 ENCOUNTER FOR SCREENING MAMMOGRAM FOR MALIGNANT NEOPLASM OF BREAST: ICD-10-CM

## 2022-06-13 PROCEDURE — 77063 BREAST TOMOSYNTHESIS BI: CPT

## 2022-06-13 PROCEDURE — 77067 SCR MAMMO BI INCL CAD: CPT

## 2022-06-24 ENCOUNTER — TELEPHONE (OUTPATIENT)
Dept: PAIN MEDICINE | Facility: CLINIC | Age: 67
End: 2022-06-24

## 2022-06-24 NOTE — TELEPHONE ENCOUNTER
Pt called wanting to r/s her sx with Dr Christopher Jefferson  I s/w pt and r/s her to 7/22  I made her aware that she is now to get her labs done the week of 7/4 and that she has to call her PCP to have them r/s her clearance to the week of 7/11  Pt verbalized understanding  Pt's f/u was also r/s to 7/29

## 2022-06-28 ENCOUNTER — TELEPHONE (OUTPATIENT)
Dept: PODIATRY | Facility: CLINIC | Age: 67
End: 2022-06-28

## 2022-06-28 NOTE — TELEPHONE ENCOUNTER
Ashtyn Morris called, she needs to change her surgery date as her  cannot take her to the hospital on 7/22/22  She can get a ride on 7/21/22 or 7/28/22  She is asking for a call back

## 2022-07-01 ENCOUNTER — APPOINTMENT (OUTPATIENT)
Dept: LAB | Facility: CLINIC | Age: 67
End: 2022-07-01

## 2022-07-01 ENCOUNTER — OFFICE VISIT (OUTPATIENT)
Dept: FAMILY MEDICINE CLINIC | Facility: CLINIC | Age: 67
End: 2022-07-01
Payer: COMMERCIAL

## 2022-07-01 VITALS
OXYGEN SATURATION: 97 % | HEIGHT: 63 IN | TEMPERATURE: 97.8 F | BODY MASS INDEX: 34.52 KG/M2 | SYSTOLIC BLOOD PRESSURE: 132 MMHG | DIASTOLIC BLOOD PRESSURE: 80 MMHG | WEIGHT: 194.8 LBS | HEART RATE: 68 BPM

## 2022-07-01 DIAGNOSIS — M21.611 BUNION, RIGHT: ICD-10-CM

## 2022-07-01 DIAGNOSIS — M21.611 BUNION, RIGHT: Primary | ICD-10-CM

## 2022-07-01 DIAGNOSIS — Z00.8 HEALTH EXAMINATION IN POPULATION SURVEY: ICD-10-CM

## 2022-07-01 DIAGNOSIS — I10 ESSENTIAL HYPERTENSION: ICD-10-CM

## 2022-07-01 DIAGNOSIS — J45.909 UNCOMPLICATED ASTHMA, UNSPECIFIED ASTHMA SEVERITY, UNSPECIFIED WHETHER PERSISTENT: ICD-10-CM

## 2022-07-01 DIAGNOSIS — Z01.818 PREOP EXAM FOR INTERNAL MEDICINE: ICD-10-CM

## 2022-07-01 DIAGNOSIS — M21.619 BUNION: ICD-10-CM

## 2022-07-01 LAB
ANION GAP SERPL CALCULATED.3IONS-SCNC: 8 MMOL/L (ref 4–13)
BUN SERPL-MCNC: 13 MG/DL (ref 5–25)
CALCIUM SERPL-MCNC: 8.9 MG/DL (ref 8.3–10.1)
CHLORIDE SERPL-SCNC: 107 MMOL/L (ref 100–108)
CHOLEST SERPL-MCNC: 186 MG/DL
CO2 SERPL-SCNC: 26 MMOL/L (ref 21–32)
CREAT SERPL-MCNC: 0.8 MG/DL (ref 0.6–1.3)
ERYTHROCYTE [DISTWIDTH] IN BLOOD BY AUTOMATED COUNT: 12.7 % (ref 11.6–15.1)
EST. AVERAGE GLUCOSE BLD GHB EST-MCNC: 100 MG/DL
GFR SERPL CREATININE-BSD FRML MDRD: 77 ML/MIN/1.73SQ M
GLUCOSE P FAST SERPL-MCNC: 83 MG/DL (ref 65–99)
HBA1C MFR BLD: 5.1 %
HCT VFR BLD AUTO: 41.5 % (ref 34.8–46.1)
HDLC SERPL-MCNC: 75 MG/DL
HGB BLD-MCNC: 13.4 G/DL (ref 11.5–15.4)
LDLC SERPL CALC-MCNC: 97 MG/DL (ref 0–100)
MCH RBC QN AUTO: 32.5 PG (ref 26.8–34.3)
MCHC RBC AUTO-ENTMCNC: 32.3 G/DL (ref 31.4–37.4)
MCV RBC AUTO: 101 FL (ref 82–98)
NONHDLC SERPL-MCNC: 111 MG/DL
PLATELET # BLD AUTO: 316 THOUSANDS/UL (ref 149–390)
PMV BLD AUTO: 11.1 FL (ref 8.9–12.7)
POTASSIUM SERPL-SCNC: 3.2 MMOL/L (ref 3.5–5.3)
RBC # BLD AUTO: 4.12 MILLION/UL (ref 3.81–5.12)
SODIUM SERPL-SCNC: 141 MMOL/L (ref 136–145)
TRIGL SERPL-MCNC: 68 MG/DL
WBC # BLD AUTO: 8.23 THOUSAND/UL (ref 4.31–10.16)

## 2022-07-01 PROCEDURE — 36415 COLL VENOUS BLD VENIPUNCTURE: CPT

## 2022-07-01 PROCEDURE — 3288F FALL RISK ASSESSMENT DOCD: CPT | Performed by: NURSE PRACTITIONER

## 2022-07-01 PROCEDURE — 85027 COMPLETE CBC AUTOMATED: CPT

## 2022-07-01 PROCEDURE — 3725F SCREEN DEPRESSION PERFORMED: CPT | Performed by: NURSE PRACTITIONER

## 2022-07-01 PROCEDURE — 80061 LIPID PANEL: CPT

## 2022-07-01 PROCEDURE — 99214 OFFICE O/P EST MOD 30 MIN: CPT | Performed by: NURSE PRACTITIONER

## 2022-07-01 PROCEDURE — 83036 HEMOGLOBIN GLYCOSYLATED A1C: CPT

## 2022-07-01 PROCEDURE — 80048 BASIC METABOLIC PNL TOTAL CA: CPT

## 2022-07-01 PROCEDURE — 93000 ELECTROCARDIOGRAM COMPLETE: CPT | Performed by: NURSE PRACTITIONER

## 2022-07-01 PROCEDURE — 1101F PT FALLS ASSESS-DOCD LE1/YR: CPT | Performed by: NURSE PRACTITIONER

## 2022-07-01 NOTE — PROGRESS NOTES
Comfort Olmedo 1955 female MRN: [id number]        ASSESSMENT/PLAN  Problem List Items Addressed This Visit        Respiratory    Asthma     Patient does not use inhalers only when she is sick she has wheezing               Cardiovascular and Mediastinum    Essential hypertension     Patient BP is well controlled off the medications at this point               Musculoskeletal and Integument    Bunion, right - Primary     Patient cleared for her upcoming surgery EKG and labs performed EKG is showing a NSR           Relevant Orders    POCT ECG (Completed)       Other    Preop exam for internal medicine    Relevant Orders    POCT ECG (Completed)          High Risk Surgery: no  CAD: no  CHF: no  CVD: no  DM2 on insulin: no  Cr>2: no        Comfort Olmedo is undergoing a Low Risk surgery  She is at [address] for major adverse cardiac event (MACE)  She may proceed with surgery as planned without further workup  SUBJECTIVE  CC: Pre-op Exam (Bunion surgery right big toe on 7/22/2022)      HPI:  Comfort Olmedo is a 74 y o  female who is planning to undergo arthroesis fusion for right foot under general by Dr Ross Owens on 7/22/2022  Patient has not had complications with anesthesia in the past   Functional status: full    Review of Systems   Constitutional: Negative for activity change, appetite change, chills, diaphoresis, fatigue, fever and unexpected weight change  HENT: Negative for congestion, ear pain, hearing loss, postnasal drip, sinus pressure, sinus pain, sneezing and sore throat  Eyes: Negative for pain, redness and visual disturbance  Respiratory: Negative for cough and shortness of breath  Cardiovascular: Negative for chest pain and leg swelling  Gastrointestinal: Negative for abdominal pain, diarrhea, nausea and vomiting  Musculoskeletal: Positive for arthralgias  Skin: Negative  Neurological: Negative for dizziness and light-headedness     Psychiatric/Behavioral: Negative for behavioral problems and dysphoric mood  Historical Information   The patient history was reviewed as follows:    Past Medical History:   Diagnosis Date    Chronic GERD 2/7/2018    Hypertension     Trochanteric bursitis 10/21/2019     Past Surgical History:   Procedure Laterality Date    LAPAROSCOPIC GASTRIC BANDING      UTERINE FIBROID SURGERY       Family History   Problem Relation Age of Onset    Hypertension Mother     Diabetes Mother     No Known Problems Father     No Known Problems Maternal Grandmother     No Known Problems Maternal Grandfather     No Known Problems Paternal Grandmother     No Known Problems Paternal Grandfather     No Known Problems Maternal Aunt     No Known Problems Maternal Aunt     No Known Problems Paternal Aunt     No Known Problems Paternal Aunt     No Known Problems Paternal Uncle     No Known Problems Paternal Uncle     No Known Problems Half-Sister     No Known Problems Half-Sister       Social History       Medications:     Current Outpatient Medications:     ergocalciferol (VITAMIN D2) 50,000 units, Take 1 capsule (50,000 Units total) by mouth once a week, Disp: 12 capsule, Rfl: 1    Nutritional Supplements (VITAMIN D BOOSTER PO), Take by mouth, Disp: , Rfl:   Allergies   Allergen Reactions    Aspirin Diarrhea and Vomiting       OBJECTIVE    Vitals:   Vitals:    07/01/22 0901   BP: 132/80   BP Location: Left arm   Patient Position: Sitting   Pulse: 68   Temp: 97 8 °F (36 6 °C)   TempSrc: Temporal   SpO2: 97%   Weight: 88 4 kg (194 lb 12 8 oz)   Height: 5' 3" (1 6 m)           Physical Exam  Vitals and nursing note reviewed  Constitutional:       General: She is not in acute distress  Appearance: She is well-developed  HENT:      Head: Normocephalic and atraumatic        Right Ear: Tympanic membrane normal       Left Ear: Tympanic membrane normal       Nose: Nose normal       Mouth/Throat:      Mouth: Mucous membranes are moist    Eyes: Pupils: Pupils are equal, round, and reactive to light  Neck:      Thyroid: No thyromegaly  Cardiovascular:      Rate and Rhythm: Normal rate and regular rhythm  Heart sounds: Normal heart sounds  No murmur heard  Pulmonary:      Effort: Pulmonary effort is normal  No respiratory distress  Breath sounds: Normal breath sounds  No wheezing  Abdominal:      General: Bowel sounds are normal       Palpations: Abdomen is soft  Musculoskeletal:         General: Normal range of motion  Cervical back: Normal range of motion  Right lower le+ Pitting Edema present  Left lower le+ Pitting Edema present  Skin:     General: Skin is warm and dry  Neurological:      General: No focal deficit present  Mental Status: She is alert and oriented to person, place, and time  Psychiatric:         Mood and Affect: Mood normal          Behavior: Behavior normal          Thought Content:  Thought content normal          Judgment: Judgment normal                 Yolande Herrera E [facility]   2022  9:51 AM

## 2022-07-01 NOTE — H&P (VIEW-ONLY)
Comfort Olmedo 1955 female MRN: [id number]        ASSESSMENT/PLAN  Problem List Items Addressed This Visit        Respiratory    Asthma     Patient does not use inhalers only when she is sick she has wheezing               Cardiovascular and Mediastinum    Essential hypertension     Patient BP is well controlled off the medications at this point               Musculoskeletal and Integument    Bunion, right - Primary     Patient cleared for her upcoming surgery EKG and labs performed EKG is showing a NSR           Relevant Orders    POCT ECG (Completed)       Other    Preop exam for internal medicine    Relevant Orders    POCT ECG (Completed)          High Risk Surgery: no  CAD: no  CHF: no  CVD: no  DM2 on insulin: no  Cr>2: no        Comfort Olmedo is undergoing a Low Risk surgery  She is at [address] for major adverse cardiac event (MACE)  She may proceed with surgery as planned without further workup  SUBJECTIVE  CC: Pre-op Exam (Bunion surgery right big toe on 7/22/2022)      HPI:  Comfort Olmedo is a 74 y o  female who is planning to undergo arthroesis fusion for right foot under general by Dr Corinne Hensley on 7/22/2022  Patient has not had complications with anesthesia in the past   Functional status: full    Review of Systems   Constitutional: Negative for activity change, appetite change, chills, diaphoresis, fatigue, fever and unexpected weight change  HENT: Negative for congestion, ear pain, hearing loss, postnasal drip, sinus pressure, sinus pain, sneezing and sore throat  Eyes: Negative for pain, redness and visual disturbance  Respiratory: Negative for cough and shortness of breath  Cardiovascular: Negative for chest pain and leg swelling  Gastrointestinal: Negative for abdominal pain, diarrhea, nausea and vomiting  Musculoskeletal: Positive for arthralgias  Skin: Negative  Neurological: Negative for dizziness and light-headedness     Psychiatric/Behavioral: Negative for behavioral problems and dysphoric mood  Historical Information   The patient history was reviewed as follows:    Past Medical History:   Diagnosis Date    Chronic GERD 2/7/2018    Hypertension     Trochanteric bursitis 10/21/2019     Past Surgical History:   Procedure Laterality Date    LAPAROSCOPIC GASTRIC BANDING      UTERINE FIBROID SURGERY       Family History   Problem Relation Age of Onset    Hypertension Mother     Diabetes Mother     No Known Problems Father     No Known Problems Maternal Grandmother     No Known Problems Maternal Grandfather     No Known Problems Paternal Grandmother     No Known Problems Paternal Grandfather     No Known Problems Maternal Aunt     No Known Problems Maternal Aunt     No Known Problems Paternal Aunt     No Known Problems Paternal Aunt     No Known Problems Paternal Uncle     No Known Problems Paternal Uncle     No Known Problems Half-Sister     No Known Problems Half-Sister       Social History       Medications:     Current Outpatient Medications:     ergocalciferol (VITAMIN D2) 50,000 units, Take 1 capsule (50,000 Units total) by mouth once a week, Disp: 12 capsule, Rfl: 1    Nutritional Supplements (VITAMIN D BOOSTER PO), Take by mouth, Disp: , Rfl:   Allergies   Allergen Reactions    Aspirin Diarrhea and Vomiting       OBJECTIVE    Vitals:   Vitals:    07/01/22 0901   BP: 132/80   BP Location: Left arm   Patient Position: Sitting   Pulse: 68   Temp: 97 8 °F (36 6 °C)   TempSrc: Temporal   SpO2: 97%   Weight: 88 4 kg (194 lb 12 8 oz)   Height: 5' 3" (1 6 m)           Physical Exam  Vitals and nursing note reviewed  Constitutional:       General: She is not in acute distress  Appearance: She is well-developed  HENT:      Head: Normocephalic and atraumatic        Right Ear: Tympanic membrane normal       Left Ear: Tympanic membrane normal       Nose: Nose normal       Mouth/Throat:      Mouth: Mucous membranes are moist    Eyes: Pupils: Pupils are equal, round, and reactive to light  Neck:      Thyroid: No thyromegaly  Cardiovascular:      Rate and Rhythm: Normal rate and regular rhythm  Heart sounds: Normal heart sounds  No murmur heard  Pulmonary:      Effort: Pulmonary effort is normal  No respiratory distress  Breath sounds: Normal breath sounds  No wheezing  Abdominal:      General: Bowel sounds are normal       Palpations: Abdomen is soft  Musculoskeletal:         General: Normal range of motion  Cervical back: Normal range of motion  Right lower le+ Pitting Edema present  Left lower le+ Pitting Edema present  Skin:     General: Skin is warm and dry  Neurological:      General: No focal deficit present  Mental Status: She is alert and oriented to person, place, and time  Psychiatric:         Mood and Affect: Mood normal          Behavior: Behavior normal          Thought Content:  Thought content normal          Judgment: Judgment normal                 Yolande Jackson E [facility]   2022  9:51 AM

## 2022-07-13 ENCOUNTER — APPOINTMENT (OUTPATIENT)
Dept: PREADMISSION TESTING | Facility: HOSPITAL | Age: 67
End: 2022-07-13
Payer: COMMERCIAL

## 2022-07-15 ENCOUNTER — TELEPHONE (OUTPATIENT)
Dept: FAMILY MEDICINE CLINIC | Facility: CLINIC | Age: 67
End: 2022-07-15

## 2022-07-15 DIAGNOSIS — I10 ESSENTIAL HYPERTENSION: Primary | ICD-10-CM

## 2022-07-15 RX ORDER — LOSARTAN POTASSIUM 25 MG/1
25 TABLET ORAL DAILY
Qty: 30 TABLET | Refills: 2 | Status: SHIPPED | OUTPATIENT
Start: 2022-07-15 | End: 2022-07-18 | Stop reason: ALTCHOICE

## 2022-07-15 NOTE — TELEPHONE ENCOUNTER
Pt was off of her bp meds and seemed to ok however its slowly going up again, She doesn't have any headaches right now tho   Can you please erx the new med you talked about at her ov to [facility]

## 2022-07-15 NOTE — PRE-PROCEDURE INSTRUCTIONS
Pre-Surgery Instructions:   Medication Instructions    ergocalciferol (VITAMIN D2) 50,000 units Instructions provided by MD    Nutritional Supplements (VITAMIN D BOOSTER PO) Stop taking 7 days prior to surgery  NPO after midnight, meds in am with sips of water  Understands when to expect preop phone call  Remove all jewelry  Advised of visitation policy  Before your operation, you play an important role in decreasing your risk for infection by washing with special antiseptic soap  This is an effective way to reduce bacteria on the skin which may help to prevent infections at the surgical site  Please read the following directions in advance  1  In the week before your operation purchase a 4 ounce bottle of antiseptic soap containing chlorhexidine gluconate 4%  Some brand names include: Aplicare, Endure, and Hibiclens  The cost is usually less than $5 00  · For your convenience, the [address] [place] [address] carries the soap  · It may also be available at your doctor's office or pre-admission testing center, and at most retail pharmacies  · If you are allergic or sensitive to soaps containing chlorhexidine gluconate (CHG), please let your doctor know so another antiseptic soap can be suggested  · CHG antiseptic soap is for external use only  2      The day before your operation follow these directions carefully to get ready  · Place clean lines (sheets) on your bed; you should sleep on clean sheets after your evening shower  · Get clean towels and washcloths ready - you need enough for 2 showers  · Set aside clean underwear, pajamas, and clothing to wear after the shower  Reminders:  · DO NOT use any other soap or body rinse on your skin during or after the antiseptic showers  · DO NOT use lotion , powder, deodorant, or perfume/aftershave of any kind on your skin after your antiseptic shower  · DO NOT shave any body parts in the 24 hours/the day before your operation    · DO NOT get the antiseptic soap in your eyes, ears, nose, mouth, or vaginal area  3      You will need to shower the night before AND the morning of your Surgery  Shower 1:  · The evening before your operation, take the fist shower  · First, shampoo your hair with regular shampoo and rinse it completely before you use the anitseptic soap  After washing and rinsing your hair, rinse your body  · Next, use a clean wash cloth to apply the antiseptic soap and wash your body from the neck down to your toes using 1/2 bottle of the antiseptic soap  You will use the other 1/2 bottle for the second shower  · Clean the area where your incision will be; later this area well for about 2 minutes  · If you ar having head or neck surgery, wash areas with the antiseptic soap  · Rinse yourself completely with running water  · Use a clean towel to dry off  · Wear clean underwear and clothing/pajamas  Shower 2:  · The Morning of your operation, take the second shower following the same steps as Shower 1 using the second 1/2 of the bottle of antiseptic soap  · Use clean cloths and towels to was and dry yourself off  · Wear clean underwear and clothing

## 2022-07-18 ENCOUNTER — TELEPHONE (OUTPATIENT)
Dept: FAMILY MEDICINE CLINIC | Facility: CLINIC | Age: 67
End: 2022-07-18

## 2022-07-18 DIAGNOSIS — I10 ESSENTIAL HYPERTENSION: Primary | ICD-10-CM

## 2022-07-18 RX ORDER — VALSARTAN 40 MG/1
40 TABLET ORAL DAILY
Qty: 30 TABLET | Refills: 2 | Status: SHIPPED | OUTPATIENT
Start: 2022-07-18 | End: 2022-07-26 | Stop reason: ALTCHOICE

## 2022-07-21 ENCOUNTER — ANESTHESIA EVENT (OUTPATIENT)
Dept: PERIOP | Facility: HOSPITAL | Age: 67
End: 2022-07-21
Payer: COMMERCIAL

## 2022-07-21 PROBLEM — Z98.84 H/O BARIATRIC SURGERY: Status: ACTIVE | Noted: 2022-07-21

## 2022-07-22 ENCOUNTER — APPOINTMENT (OUTPATIENT)
Dept: RADIOLOGY | Facility: HOSPITAL | Age: 67
End: 2022-07-22
Payer: COMMERCIAL

## 2022-07-22 ENCOUNTER — HOSPITAL ENCOUNTER (OUTPATIENT)
Facility: HOSPITAL | Age: 67
Setting detail: OUTPATIENT SURGERY
Discharge: HOME | End: 2022-07-22
Attending: PODIATRIST | Admitting: PODIATRIST
Payer: COMMERCIAL

## 2022-07-22 ENCOUNTER — ANESTHESIA (OUTPATIENT)
Dept: PERIOP | Facility: HOSPITAL | Age: 67
End: 2022-07-22
Payer: COMMERCIAL

## 2022-07-22 VITALS
DIASTOLIC BLOOD PRESSURE: 60 MMHG | BODY MASS INDEX: 34.38 KG/M2 | RESPIRATION RATE: 16 BRPM | HEART RATE: 83 BPM | HEIGHT: 63 IN | SYSTOLIC BLOOD PRESSURE: 129 MMHG | OXYGEN SATURATION: 95 % | WEIGHT: 194 LBS | TEMPERATURE: 98.4 F

## 2022-07-22 DIAGNOSIS — G89.18 POST-OP PAIN: Primary | ICD-10-CM

## 2022-07-22 PROCEDURE — 73630 X-RAY EXAM OF FOOT: CPT

## 2022-07-22 PROCEDURE — C1713 ANCHOR/SCREW BN/BN,TIS/BN: HCPCS | Performed by: PODIATRIST

## 2022-07-22 PROCEDURE — NC001 PR NO CHARGE: Performed by: PODIATRIST

## 2022-07-22 PROCEDURE — 76000 FLUOROSCOPY <1 HR PHYS/QHP: CPT | Performed by: PODIATRIST

## 2022-07-22 PROCEDURE — 28750 FUSION OF BIG TOE JOINT: CPT | Performed by: PODIATRIST

## 2022-07-22 PROCEDURE — 73620 X-RAY EXAM OF FOOT: CPT

## 2022-07-22 DEVICE — SLIM STRAIGHT PLATE
Type: IMPLANTABLE DEVICE | Site: FOOT | Status: FUNCTIONAL
Brand: VARIAX

## 2022-07-22 DEVICE — LOCKING SCREW, FULLY THREADED,T8
Type: IMPLANTABLE DEVICE | Site: FOOT | Status: FUNCTIONAL
Brand: VARIAX

## 2022-07-22 DEVICE — HEADLESS SCREW
Type: IMPLANTABLE DEVICE | Site: FOOT | Status: FUNCTIONAL
Brand: MINI

## 2022-07-22 DEVICE — BONE SCREW, FULLY THREADED, T8
Type: IMPLANTABLE DEVICE | Site: FOOT | Status: FUNCTIONAL
Brand: VARIAX

## 2022-07-22 RX ORDER — MAGNESIUM HYDROXIDE 1200 MG/15ML
LIQUID ORAL AS NEEDED
Status: DISCONTINUED | OUTPATIENT
Start: 2022-07-22 | End: 2022-07-22 | Stop reason: HOSPADM

## 2022-07-22 RX ORDER — ONDANSETRON 2 MG/ML
4 INJECTION INTRAMUSCULAR; INTRAVENOUS EVERY 6 HOURS PRN
Status: DISCONTINUED | OUTPATIENT
Start: 2022-07-22 | End: 2022-07-23 | Stop reason: HOSPADM

## 2022-07-22 RX ORDER — FENTANYL CITRATE 50 UG/ML
INJECTION, SOLUTION INTRAMUSCULAR; INTRAVENOUS
Status: COMPLETED
Start: 2022-07-22 | End: 2022-07-22

## 2022-07-22 RX ORDER — SODIUM CHLORIDE, SODIUM LACTATE, POTASSIUM CHLORIDE, CALCIUM CHLORIDE 600; 310; 30; 20 MG/100ML; MG/100ML; MG/100ML; MG/100ML
100 INJECTION, SOLUTION INTRAVENOUS CONTINUOUS
Status: ACTIVE | OUTPATIENT
Start: 2022-07-22 | End: 2022-07-22

## 2022-07-22 RX ORDER — FENTANYL CITRATE 50 UG/ML
INJECTION, SOLUTION INTRAMUSCULAR; INTRAVENOUS AS NEEDED
Status: DISCONTINUED | OUTPATIENT
Start: 2022-07-22 | End: 2022-07-22

## 2022-07-22 RX ORDER — LIDOCAINE HYDROCHLORIDE 20 MG/ML
INJECTION, SOLUTION EPIDURAL; INFILTRATION; INTRACAUDAL; PERINEURAL AS NEEDED
Status: DISCONTINUED | OUTPATIENT
Start: 2022-07-22 | End: 2022-07-22 | Stop reason: HOSPADM

## 2022-07-22 RX ORDER — ONDANSETRON 2 MG/ML
INJECTION INTRAMUSCULAR; INTRAVENOUS AS NEEDED
Status: DISCONTINUED | OUTPATIENT
Start: 2022-07-22 | End: 2022-07-22

## 2022-07-22 RX ORDER — ACETAMINOPHEN 325 MG/1
TABLET ORAL
Status: COMPLETED
Start: 2022-07-22 | End: 2022-07-22

## 2022-07-22 RX ORDER — OXYCODONE HYDROCHLORIDE 5 MG/1
5 TABLET ORAL EVERY 4 HOURS PRN
Status: DISCONTINUED | OUTPATIENT
Start: 2022-07-22 | End: 2022-07-23 | Stop reason: HOSPADM

## 2022-07-22 RX ORDER — ONDANSETRON 2 MG/ML
4 INJECTION INTRAMUSCULAR; INTRAVENOUS ONCE AS NEEDED
Status: DISCONTINUED | OUTPATIENT
Start: 2022-07-22 | End: 2022-07-22 | Stop reason: HOSPADM

## 2022-07-22 RX ORDER — LIDOCAINE HYDROCHLORIDE 20 MG/ML
INJECTION, SOLUTION EPIDURAL; INFILTRATION; INTRACAUDAL; PERINEURAL AS NEEDED
Status: DISCONTINUED | OUTPATIENT
Start: 2022-07-22 | End: 2022-07-22

## 2022-07-22 RX ORDER — POTASSIUM CHLORIDE 1.5 G/1.77G
20 POWDER, FOR SOLUTION ORAL DAILY
COMMUNITY

## 2022-07-22 RX ORDER — SODIUM CHLORIDE, SODIUM LACTATE, POTASSIUM CHLORIDE, CALCIUM CHLORIDE 600; 310; 30; 20 MG/100ML; MG/100ML; MG/100ML; MG/100ML
100 INJECTION, SOLUTION INTRAVENOUS CONTINUOUS
Status: DISCONTINUED | OUTPATIENT
Start: 2022-07-22 | End: 2022-07-22

## 2022-07-22 RX ORDER — FENTANYL CITRATE/PF 50 MCG/ML
25 SYRINGE (ML) INJECTION
Status: DISCONTINUED | OUTPATIENT
Start: 2022-07-22 | End: 2022-07-22 | Stop reason: HOSPADM

## 2022-07-22 RX ORDER — PROPOFOL 10 MG/ML
INJECTION, EMULSION INTRAVENOUS AS NEEDED
Status: DISCONTINUED | OUTPATIENT
Start: 2022-07-22 | End: 2022-07-22

## 2022-07-22 RX ORDER — DEXAMETHASONE SODIUM PHOSPHATE 10 MG/ML
INJECTION, SOLUTION INTRAMUSCULAR; INTRAVENOUS AS NEEDED
Status: DISCONTINUED | OUTPATIENT
Start: 2022-07-22 | End: 2022-07-22

## 2022-07-22 RX ORDER — OXYCODONE HYDROCHLORIDE AND ACETAMINOPHEN 5; 325 MG/1; MG/1
1 TABLET ORAL EVERY 4 HOURS PRN
Qty: 15 TABLET | Refills: 0 | Status: SHIPPED | OUTPATIENT
Start: 2022-07-22 | End: 2022-08-01

## 2022-07-22 RX ORDER — CHLORHEXIDINE GLUCONATE 0.12 MG/ML
15 RINSE ORAL ONCE
Status: COMPLETED | OUTPATIENT
Start: 2022-07-22 | End: 2022-07-22

## 2022-07-22 RX ORDER — CEFAZOLIN SODIUM 2 G/50ML
2000 SOLUTION INTRAVENOUS ONCE
Status: COMPLETED | OUTPATIENT
Start: 2022-07-22 | End: 2022-07-22

## 2022-07-22 RX ORDER — ACETAMINOPHEN 325 MG/1
650 TABLET ORAL EVERY 4 HOURS PRN
Status: DISCONTINUED | OUTPATIENT
Start: 2022-07-22 | End: 2022-07-23 | Stop reason: HOSPADM

## 2022-07-22 RX ORDER — MIDAZOLAM HYDROCHLORIDE 2 MG/2ML
INJECTION, SOLUTION INTRAMUSCULAR; INTRAVENOUS AS NEEDED
Status: DISCONTINUED | OUTPATIENT
Start: 2022-07-22 | End: 2022-07-22

## 2022-07-22 RX ADMIN — FENTANYL CITRATE 25 MCG: 50 INJECTION, SOLUTION INTRAMUSCULAR; INTRAVENOUS at 13:56

## 2022-07-22 RX ADMIN — DEXAMETHASONE SODIUM PHOSPHATE 8 MG: 10 INJECTION INTRAMUSCULAR; INTRAVENOUS at 12:24

## 2022-07-22 RX ADMIN — FENTANYL CITRATE 25 MCG: 50 INJECTION, SOLUTION INTRAMUSCULAR; INTRAVENOUS at 14:07

## 2022-07-22 RX ADMIN — MIDAZOLAM 2 MG: 1 INJECTION INTRAMUSCULAR; INTRAVENOUS at 12:10

## 2022-07-22 RX ADMIN — SODIUM CHLORIDE, SODIUM LACTATE, POTASSIUM CHLORIDE, AND CALCIUM CHLORIDE 100 ML/HR: .6; .31; .03; .02 INJECTION, SOLUTION INTRAVENOUS at 13:51

## 2022-07-22 RX ADMIN — CEFAZOLIN SODIUM 2000 MG: 2 SOLUTION INTRAVENOUS at 12:10

## 2022-07-22 RX ADMIN — FENTANYL CITRATE 25 MCG: 50 INJECTION, SOLUTION INTRAMUSCULAR; INTRAVENOUS at 12:15

## 2022-07-22 RX ADMIN — ACETAMINOPHEN 650 MG: 325 TABLET ORAL at 14:38

## 2022-07-22 RX ADMIN — PROPOFOL 160 MG: 10 INJECTION, EMULSION INTRAVENOUS at 12:15

## 2022-07-22 RX ADMIN — LIDOCAINE HYDROCHLORIDE 100 MG: 20 INJECTION, SOLUTION EPIDURAL; INFILTRATION; INTRACAUDAL; PERINEURAL at 12:15

## 2022-07-22 RX ADMIN — ONDANSETRON 4 MG: 2 INJECTION INTRAMUSCULAR; INTRAVENOUS at 13:27

## 2022-07-22 RX ADMIN — CHLORHEXIDINE GLUCONATE 0.12% ORAL RINSE 15 ML: 1.2 LIQUID ORAL at 11:42

## 2022-07-22 RX ADMIN — SODIUM CHLORIDE, SODIUM LACTATE, POTASSIUM CHLORIDE, AND CALCIUM CHLORIDE 100 ML/HR: .6; .31; .03; .02 INJECTION, SOLUTION INTRAVENOUS at 11:43

## 2022-07-22 RX ADMIN — FENTANYL CITRATE 25 MCG: 50 INJECTION, SOLUTION INTRAMUSCULAR; INTRAVENOUS at 12:41

## 2022-07-22 NOTE — OP NOTE
OPERATIVE REPORT  PATIENT NAME: Jose Henson    :  1955  MRN: [id number]  Pt Location: [facility] OR ROOM 01    SURGERY DATE: 2022    Surgeon(s) and Role:     * Luisito Truong [facility]    Preop Diagnosis:  Bunion, right [M21 611]    Post-Op Diagnosis Codes:     * Bunion, right [M21 611]    Procedure(s) (LRB):  ARTHRODESIS / FUSION FOOT (Right)    Specimen(s):  * No specimens in log *    Estimated Blood Loss:   Minimal    Drains:  * No LDAs found *    Anesthesia Type:   General    Operative Indications:  Bunion, right [M21 611]      Operative Findings:  Consistent with diagnosis, denuded cartilage on the plantar aspect of the right 1st metatarsal head    Complications:   None    Procedure and Technique:  The patient was brought in the operating room and placed on operating room table in supine position following IV sedation local anesthesia was obtained around the patient's right 1st metatarsophalangeal joint utilizing total 10 cc of 2% lidocaine plain  The digit was then directed to the right 1st MPJ where bunion deformities noted, incision was made overlying the 1st MPJ and this was carried down to the EHL tendon utilizing combination sharp and blunt dissection  Bleeders were cauterized as necessary, a capsular incision was made down to bone overlying the 1st MTPJ  All capsular structures were reflected off the 1st MPJ and cup cone Reamer were utilized to prepare the metatarsal head and base of proximal phalanx for fusion  Cardio cartilage was removed down to bleeding subchondral bone, and this was fenestrated utilizing 2-0 drill bit  A and inter frag mental [place] 3-0 screw was placed across the joint, and an anti-rotational dorsal plate was placed on the dorsal aspect joint according to manufacture guidelines  The wounds and rinsed with copious amounts of sterile saline  Good compression was noted on AP and lateral views with C-arm      The incision was then closed utilizing 3-0 Vicryl, 4-0 Monocryl and 3-0 nylon  The patient tolerated procedure and anesthesia well  Dressing applied of Adaptic, 4x4s, Hailey, posterior splint with 4 and 6 in cast padding covered with 4 and 6 in Ace    No prominences were noted       I was present for the entire procedure    Patient Disposition:  PACU  and extubated and stable      SIGNATURE: Verónica Allen DPM  DATE: July 22, 2022  TIME: 1:44 PM

## 2022-07-22 NOTE — INTERVAL H&P NOTE
H&P reviewed  After examining the patient I find no changes in the patients condition since the H&P had been written      Vitals:    07/22/22 1113   BP: 143/70   Pulse: 65   Resp: 18   Temp: 98 2 °F (36 8 °C)   SpO2: 99%   Lungs -Clear  Heart RRR

## 2022-07-22 NOTE — ANESTHESIA PREPROCEDURE EVALUATION
Procedure:  ARTHRODESIS / FUSION FOOT (Right Foot)    Relevant Problems   CARDIO   (+) Essential hypertension   (+) Hyperlipidemia      GI/HEPATIC   (+) H/O bariatric surgery      PULMONARY   (+) Asthma             Anesthesia Plan  ASA Score- 2     Anesthesia Type- general with ASA Monitors  Additional Monitors:   Airway Plan:           Plan Factors-    Chart reviewed  Patient summary reviewed  Induction- intravenous  Postoperative Plan-     Informed Consent- Anesthetic plan and risks discussed with patient  I personally reviewed this patient with the CRNA  Discussed and agreed on the Anesthesia Plan with the CRNA  Ayana Chapman

## 2022-07-22 NOTE — DISCHARGE INSTRUCTIONS
Dr Isaac Vital, DPM  Post-op surgery Instructions    Pain / Swelling  There is expected to be some discomfort, swelling and bruising of the foot  You might see some blood on the bandage  This is not a cause for alarm  However, if there is active or persistent bleeding (blood running out of the bandage while at rest) - call the office at once (or) go to a [facility] ER and ask them to page the podiatry residents  Apply an ice bag to the top of your ankle for 30 minutes for each waking hour, for the first 72 hours  This should be discontinued when sleeping  This will also work through your cast if you have one  Ice must not leak and wet the dressings  Also, using the ice inappropriately can cause permanent nerve damage  Your foot should be elevated as much as possible for the first 72 hours  The foot should be above heart level  If your foot is below heart level, throbbing and pain will increase  When sleeping, elevation can be accomplished by putting a small hard suitcase between the box spring and mattress at the foot of the bed  Walking and standing will increase pain, throbbing and bleeding  Persistent pain despite elevation and your pain meds can many times be relieved by removing the tight brown compression layer (called the ACE wrap) that is over the white gauze dressing  If you are elevating and taking your pain meds and pain is still severe, remove this brown stretchy layer but leave the gauze intact  Wait 30 minutes  If the pain subsides, reapply the ACE so its not so tight  If pain doesnt get better, call your doctor  Dressings / Casts  Do not remove your surgical dressings - they will be changed at your doctor appointment  Do not allow surgical dressings to get wet  Sponge baths should be used until the sutures are removed  Do not try to keep the foot dry using a garbage bag and tape - this rarely works  If you get your dressings or cast wet - call your doctor immediately    If your cast or dressings feel tight - elevate your foot for 30 minutes  If this doesnt help and you feel tingling or see toe discoloration - call your doctor or go to a [facility] ER and ask them to page the podiatry residents  Do not put things in your cast such as powder, coat hangers to scratch, etc  This can cause skin damage and infections  Infection  If you have a fever at or above 100 degrees, chills, sweats, or see red streaks rising above the dressing or smell odor / see pus (creamy white drainage), call your doctor immediately or go to a [facility] ER and ask them to page the podiatry residents  Constipation  If you have severe constipation after surgery, this can be due to the pain medication  Notify your doctor and special medication will be prescribed to deal with this  Numbness  It is normal for your foot to be numb until about dinner time  If youve had a popliteal block procedure, you might be numb until the following day  When you start to feel pins and needles in the foot - this means the block is wearing off  That is the appropriate time to take your pain medication  Pain Medication  Do not supplement your pain medication with over the counter drugs, old leftover pain medications, or extra Tylenol  You must discuss any additional medications with your doctor prior to taking them for pain  Driving  No driving is allowed without discussion with the doctor  Ambulation  Nonweightbearing to surgical foot  If given a flat, stiff shoe / darco wedge shoe, Do not walk at all without it  Use a device (cane, walker, crutches) to take some weight off of the foot when walking  If instructed not to put weight on the surgical foot, use the following:  Crutches     Putting weight on the foot will lead to complications

## 2022-07-22 NOTE — DISCHARGE SUMMARY
Discharge Summary - Ayleen Chatterjee 74 y o  female MRN: [id number]    Unit/Bed#: OR POOL Encounter: [id number]    Admission Date:     Admitting Diagnosis: Anais, right [M21 611]    HPI: s/p Right 1st MTPJ fusion    Procedures Performed: No orders of the defined types were placed in this encounter  Summary of Hospital Course: s/p Right 1st MTPJ fusion    Significant Findings, Care, Treatment and Services Provided: s/p Right 1st MTPJ fusion    Complications: s/p Right 1st MTPJ fusion    Discharge Diagnosis: s/p Right 1st MTPJ fusion    Medical Problems             Resolved Problems  Date Reviewed: 7/1/2022   None                 Condition at Discharge: good         Discharge instructions/Information to patient and family:   See after visit summary for information provided to patient and family  Provisions for Follow-Up Care:  See after visit summary for information related to follow-up care and any pertinent home health orders  PCP: Sharee Rodríguez DO     Disposition: Home    Planned Readmission: No      Discharge Statement   I spent 15 minutes discharging the patient  This time was spent on the day of discharge  I had direct contact with the patient on the day of discharge  Additional documentation is required if more than 30 minutes were spent on discharge  Discharge Medications:  See after visit summary for reconciled discharge medications provided to patient and family

## 2022-07-22 NOTE — ANESTHESIA POSTPROCEDURE EVALUATION
Post-Op Assessment Note    CV Status:  Stable  Pain Score: 0    Pain management: adequate     Mental Status:  Arousable   Hydration Status:  Stable   PONV Controlled:  None   Airway Patency:  Patent      Post Op Vitals Reviewed: Yes      Staff: CRNA         No complications documented      BP   146/68   Temp   97 3   Pulse  84   Resp   16   SpO2   99%

## 2022-07-25 ENCOUNTER — TELEPHONE (OUTPATIENT)
Dept: FAMILY MEDICINE CLINIC | Facility: CLINIC | Age: 67
End: 2022-07-25

## 2022-07-25 ENCOUNTER — TELEPHONE (OUTPATIENT)
Dept: PODIATRY | Facility: CLINIC | Age: 67
End: 2022-07-25

## 2022-07-25 DIAGNOSIS — I10 ESSENTIAL HYPERTENSION: Primary | ICD-10-CM

## 2022-07-25 RX ORDER — VALSARTAN 40 MG/1
40 TABLET ORAL DAILY
Qty: 30 TABLET | Refills: 5 | Status: SHIPPED | OUTPATIENT
Start: 2022-07-25 | End: 2022-07-26 | Stop reason: ALTCHOICE

## 2022-07-25 NOTE — TELEPHONE ENCOUNTER
Called pt to let her know that fax # she gave me isn't working  I told her she's going to have to call her employer and see if there's another # I can fax it to  Pt will call me back

## 2022-07-25 NOTE — TELEPHONE ENCOUNTER
[employer] needs prior auth - marked as Brand and patient has a paid direct plan    ID #[id number] and prior auth phone # [phone]

## 2022-07-26 DIAGNOSIS — I10 ESSENTIAL HYPERTENSION: Primary | ICD-10-CM

## 2022-07-26 RX ORDER — VALSARTAN AND HYDROCHLOROTHIAZIDE 80; 12.5 MG/1; MG/1
1 TABLET, FILM COATED ORAL DAILY
Qty: 90 TABLET | Refills: 1 | Status: SHIPPED | OUTPATIENT
Start: 2022-07-26 | End: 2022-08-01 | Stop reason: SDUPTHER

## 2022-07-26 NOTE — TELEPHONE ENCOUNTER
Patient called back saying that this was the wrong medication all together, she said that she takes that Valsartan/HCTZ 80/12 5 tablets  Is this what she should be taking? Can you please clarify before any prior auths are started?

## 2022-07-26 NOTE — TELEPHONE ENCOUNTER
Can you please send that?  On 7/18 it appears that 40 mg of Valsartan was sent in not what patient stated she takes

## 2022-07-29 ENCOUNTER — OFFICE VISIT (OUTPATIENT)
Dept: PODIATRY | Facility: CLINIC | Age: 67
End: 2022-07-29

## 2022-07-29 ENCOUNTER — APPOINTMENT (OUTPATIENT)
Dept: RADIOLOGY | Facility: CLINIC | Age: 67
End: 2022-07-29
Payer: COMMERCIAL

## 2022-07-29 VITALS — HEIGHT: 63 IN | WEIGHT: 194 LBS | BODY MASS INDEX: 34.38 KG/M2

## 2022-07-29 DIAGNOSIS — M21.611 BUNION, RIGHT: Primary | ICD-10-CM

## 2022-07-29 DIAGNOSIS — M21.611 BUNION, RIGHT: ICD-10-CM

## 2022-07-29 PROCEDURE — 99024 POSTOP FOLLOW-UP VISIT: CPT | Performed by: PODIATRIST

## 2022-07-29 PROCEDURE — 73630 X-RAY EXAM OF FOOT: CPT

## 2022-07-29 NOTE — LETTER
July 29, 2022     Patient: Cindi Piedra  YOB: 1955  Date of Visit: 7/29/2022      To Whom it May Concern:    Cindi Piedra is under my professional care  Shreya Prado was seen in my office on 7/29/2022  She is not to return to work for the next 7 weeks as she is unable to bear full weight on this right foot    If you have any questions or concerns, please don't hesitate to call           Sincerely,          Randy Gutierrez DPM        CC: Cindi Piedra

## 2022-07-29 NOTE — PROGRESS NOTES
PATIENT:  Hayden Millan      1955    ASSESSMENT     1  Bunion, right  X-ray foot right 3+ views          PLAN  Patient is doing well post-operatively  Sutures left intact  Incision was cleaned with betadine and DSD applied to be kept C/D/I  Continue post-op care as instructed  Stressed on patient compliance about proper off-loading, staying off of feet, and proper dressing care  Call if any increase in pain, fevers, calf pain, shortness of breath, or general distress is noted  Patient instructed to go to ER if call is not returned immediately  - No work for the next 7 weeks as she is unable to bear full weight on this right foot  -WBAt to the R heel with crutch assistance for the next 4 weeks and hopeful transition to heel weight bearing without crutches  HISTORY OF PRESENT ILLNESS  Patient presents for post-op appointment  Post-op pain is under control and resolving well  The patient is feeling well and in good spirits  Patient reported no post-op concern  Patient relates compliance with splint and crutches, elevation, and keeping dressing clean, dry and intact  REVIEW OF SYSTEMS  GENERAL: No fever or chills  HEART: No chest pain, or palpitation  RESPIRATORY:  No SOB or cough  GI: No Nausea, vomit or diarrhea  NEUROLOGIC: No syncope or acute weakness  MUSCULOSKELETAL: No calf pain or edema  PHYSICAL EXAMINATION  GENERAL  The patient appears in NAD / non-toxic  Afebrile  VSS    VASCULAR EXAM  Pedal pulses and vascular status are intact  No calf pain or edema bilaterally  No cyanosis  DERMATOLOGIC EXAM  Incision is coapted and healing well  No signs of infection  No active drainage  Normal post-op edema and ecchymosis  No necrosis or dehiscence  NEUROLOGIC EXAM  AAO X 3  No focal neurologic deficit  Neurologic status is intact BLE  MUSCULOSKELETAL EXAM  Good surgical correction  Normal post-op findings  ROM intact  No fluctuation or crepitus

## 2022-08-01 DIAGNOSIS — I10 ESSENTIAL HYPERTENSION: ICD-10-CM

## 2022-08-01 RX ORDER — VALSARTAN AND HYDROCHLOROTHIAZIDE 80; 12.5 MG/1; MG/1
1 TABLET, FILM COATED ORAL DAILY
Qty: 90 TABLET | Refills: 1 | Status: SHIPPED | OUTPATIENT
Start: 2022-08-01 | End: 2022-08-29 | Stop reason: SDUPTHER

## 2022-08-05 ENCOUNTER — OFFICE VISIT (OUTPATIENT)
Dept: PODIATRY | Facility: CLINIC | Age: 67
End: 2022-08-05

## 2022-08-05 VITALS — WEIGHT: 194 LBS | HEIGHT: 63 IN | BODY MASS INDEX: 34.38 KG/M2

## 2022-08-05 DIAGNOSIS — M21.611 BUNION, RIGHT: Primary | ICD-10-CM

## 2022-08-05 DIAGNOSIS — M21.619 BUNION: ICD-10-CM

## 2022-08-05 PROCEDURE — 99024 POSTOP FOLLOW-UP VISIT: CPT | Performed by: PODIATRIST

## 2022-08-05 NOTE — PROGRESS NOTES
Assessment/Plan:      Diagnoses and all orders for this visit:    Bunion, right    Bunion        - postop 2nd week status post right 1st MTPJ fusion, sutures removed today and she is to begin showering in 2 days and remove dressing   -she is to become heel weight-bearing with crutch assistance for the next 2 weeks and she is to hopefully DC crutches at 2 weeks and return in her boot with hopeful transition to normal shoes in 4 weeks  Continue Tylenol and ibuprofen for pain      Subjective:     Patient ID: Hayes Lima is a 74 y o  female  Patient presents for evaluation management of right foot 1st MPJ fusions stat 2 weeks status post fusion  Is nonweightbearing in boot utilizing crutches, her pain is well controlled she is now using Tylenol, notes no other pedal complaints      Review of Systems   Constitutional: Negative for chills and fever  HENT: Negative for ear pain and sore throat  Eyes: Negative for pain and visual disturbance  Respiratory: Negative for cough and shortness of breath  Cardiovascular: Negative for chest pain and palpitations  Gastrointestinal: Negative for abdominal pain and vomiting  Genitourinary: Negative for dysuria and hematuria  Musculoskeletal: Negative for arthralgias and back pain  Skin: Negative for color change and rash  Neurological: Negative for seizures and syncope  All other systems reviewed and are negative  Objective:     Physical Exam  Vitals reviewed  Constitutional:       Appearance: Normal appearance  HENT:      Head: Normocephalic and atraumatic  Nose: Nose normal       Mouth/Throat:      Mouth: Mucous membranes are moist    Eyes:      Pupils: Pupils are equal, round, and reactive to light  Pulmonary:      Effort: Pulmonary effort is normal    Musculoskeletal:         General: Swelling and tenderness present  Comments: No calf tenderness neurovascular status is at baseline, no range of motion overlying the 1st MTPJ  Sutures are intact no evidence of dehiscence following suture removal   Swelling normal at this point   Skin:     Capillary Refill: Capillary refill takes 2 to 3 seconds  Neurological:      General: No focal deficit present  Mental Status: She is alert and oriented to person, place, and time

## 2022-08-08 PROBLEM — Z01.818 PREOP EXAM FOR INTERNAL MEDICINE: Status: RESOLVED | Noted: 2022-07-01 | Resolved: 2022-08-08

## 2022-08-08 PROBLEM — M21.611 BUNION, RIGHT: Status: RESOLVED | Noted: 2022-05-06 | Resolved: 2022-08-08

## 2022-08-08 PROBLEM — Z12.31 ENCOUNTER FOR SCREENING MAMMOGRAM FOR MALIGNANT NEOPLASM OF BREAST: Status: RESOLVED | Noted: 2022-01-11 | Resolved: 2022-08-08

## 2022-08-08 PROBLEM — Z12.11 SCREENING FOR COLON CANCER: Status: RESOLVED | Noted: 2022-01-11 | Resolved: 2022-08-08

## 2022-08-08 PROBLEM — Z00.00 ANNUAL PHYSICAL EXAM: Status: RESOLVED | Noted: 2022-01-11 | Resolved: 2022-08-08

## 2022-08-22 ENCOUNTER — APPOINTMENT (OUTPATIENT)
Dept: RADIOLOGY | Facility: CLINIC | Age: 67
End: 2022-08-22
Payer: COMMERCIAL

## 2022-08-22 ENCOUNTER — OFFICE VISIT (OUTPATIENT)
Dept: PODIATRY | Facility: CLINIC | Age: 67
End: 2022-08-22

## 2022-08-22 VITALS
HEART RATE: 73 BPM | HEIGHT: 63 IN | WEIGHT: 194 LBS | DIASTOLIC BLOOD PRESSURE: 85 MMHG | BODY MASS INDEX: 34.38 KG/M2 | SYSTOLIC BLOOD PRESSURE: 125 MMHG

## 2022-08-22 DIAGNOSIS — M21.611 BUNION, RIGHT: Primary | ICD-10-CM

## 2022-08-22 DIAGNOSIS — M21.611 BUNION, RIGHT: ICD-10-CM

## 2022-08-22 PROCEDURE — 73630 X-RAY EXAM OF FOOT: CPT

## 2022-08-22 PROCEDURE — 99024 POSTOP FOLLOW-UP VISIT: CPT | Performed by: PODIATRIST

## 2022-08-22 NOTE — LETTER
September 19, 2022     Patient: Ruth Chávez  YOB: 1955  Date of Visit: 8/22/2022      To Whom it May Concern:    Ruth Chávez is under my professional care  Vibha Smalls was seen in my office on 8/22/2022  Vibha Smalls may return to work on 9/20/22  Weight bearing as tolerated  If you have any questions or concerns, please don't hesitate to call           Sincerely,          Ni Rogers DPM        CC: Ruth Chávez

## 2022-08-22 NOTE — LETTER
September 19, 2022     Patient: Thomas Butler  YOB: 1955  Date of Visit: 8/22/2022      To Whom it May Concern:    Thomas Butler is under my professional care  Carolina Hectorpeyton was seen in my office on 8/22/2022  Carolina Magaña may return to work on 9/19/22  With light duty limitations  If you have any questions or concerns, please don't hesitate to call           Sincerely,          Cristian Swift DPM        CC: No Recipients

## 2022-08-22 NOTE — PROGRESS NOTES
Assessment/Plan:      Diagnoses and all orders for this visit:    Rudiion, right  -     X-ray foot right 3+ views; Future      -x-rays three views taken reviewed of the right foot and do show some consolidation on the plantar aspect of the joint, there is still joint visible along the dorsal and lateral aspect of the joint     -good position noted of the joint good surgical reduction  -she is to return to normal shoes pain permitting and to offload this area with use of her crutch versus a cane for the next 2-3 weeks   -she is to return in 2-3 weeks for further assessment of her pain and hopeful transition back to work  Subjective:     Patient ID: Kian Taylor is a 74 y o  female  Patient presents for evaluation management of her right 1st MPJ fusion site she is approximately 4 weeks out, she is going to [place] in a couple weeks  Notes no pain, she is been in boot, and with crutch assistance  She is been taking the boot off to wear and sleep at night      Review of Systems   Constitutional: Negative for chills and fever  HENT: Negative for ear pain and sore throat  Eyes: Negative for pain and visual disturbance  Respiratory: Negative for cough and shortness of breath  Cardiovascular: Negative for chest pain and palpitations  Gastrointestinal: Negative for abdominal pain and vomiting  Genitourinary: Negative for dysuria and hematuria  Musculoskeletal: Negative for arthralgias and back pain  Skin: Negative for color change and rash  Neurological: Negative for seizures and syncope  All other systems reviewed and are negative  Objective:     Physical Exam  Vitals reviewed  Constitutional:       Appearance: Normal appearance  She is obese  HENT:      Head: Normocephalic and atraumatic  Nose: Nose normal       Mouth/Throat:      Mouth: Mucous membranes are moist    Eyes:      Pupils: Pupils are equal, round, and reactive to light  Cardiovascular:      Pulses: Normal pulses  Pulmonary:      Effort: Pulmonary effort is normal    Musculoskeletal:         General: Swelling present  No tenderness  Comments: No tenderness to the fusion site, there is some persistent swelling, no range of motion at the MTPJ as expected, good position while weight-bearing   Skin:     Capillary Refill: Capillary refill takes 2 to 3 seconds  Neurological:      General: No focal deficit present  Mental Status: She is alert and oriented to person, place, and time

## 2022-08-22 NOTE — LETTER
September 19, 2022     Patient: Gustavo Beverly  YOB: 1955  Date of Visit: 8/22/2022      To Whom it May Concern:    Gustavo Beverly is under my professional care  Hayden Grazyna was seen in my office on 8/22/2022  Haydenpaty Geronimo may return to work on 9/19/22 with no restrictions  If you have any questions or concerns, please don't hesitate to call           Sincerely,          Dennise Olivarez DPM        CC: No Recipients

## 2022-08-29 DIAGNOSIS — I10 ESSENTIAL HYPERTENSION: ICD-10-CM

## 2022-08-29 RX ORDER — VALSARTAN AND HYDROCHLOROTHIAZIDE 80; 12.5 MG/1; MG/1
1 TABLET, FILM COATED ORAL DAILY
Qty: 90 TABLET | Refills: 1 | Status: SHIPPED | OUTPATIENT
Start: 2022-08-29 | End: 2023-02-25

## 2022-09-16 ENCOUNTER — TELEPHONE (OUTPATIENT)
Dept: OBGYN CLINIC | Facility: CLINIC | Age: 67
End: 2022-09-16

## 2022-09-16 NOTE — TELEPHONE ENCOUNTER
Carolina Magaña walked into the office today stating she needs a note to be released back to work  But in the last visit with Dr Joseph Mckenna he states that she is to return in 2-3 weeks for further assessment of her pain and hopeful transition back to work  She tried to go back to work early but work sent her home beings she not released yet  Patient has an appointment with us on Friday September 23rd 2022

## 2022-09-19 ENCOUNTER — TELEPHONE (OUTPATIENT)
Dept: PODIATRY | Facility: CLINIC | Age: 67
End: 2022-09-19

## 2022-09-19 NOTE — TELEPHONE ENCOUNTER
Spoke with pt  She cannot confirm the last name of the 2 names she provided  After some searching I found haven on teams, messaged her and she told me to have Steffanie Ybarra send the note to HR  I then spoke to [facility] again to rely the message and she told me to just send the letter to [facility]  But did not supply any numbers for her    She did give me a number [phone] which I sent the note too and it was a office number so the note did not go through

## 2022-09-23 ENCOUNTER — OFFICE VISIT (OUTPATIENT)
Dept: PODIATRY | Facility: CLINIC | Age: 67
End: 2022-09-23

## 2022-09-23 ENCOUNTER — APPOINTMENT (OUTPATIENT)
Dept: RADIOLOGY | Facility: CLINIC | Age: 67
End: 2022-09-23
Payer: COMMERCIAL

## 2022-09-23 VITALS — BODY MASS INDEX: 34.38 KG/M2 | WEIGHT: 194 LBS | HEIGHT: 63 IN

## 2022-09-23 DIAGNOSIS — M21.611 BUNION, RIGHT: ICD-10-CM

## 2022-09-23 DIAGNOSIS — B35.1 ONYCHOMYCOSIS: ICD-10-CM

## 2022-09-23 DIAGNOSIS — M21.611 BUNION, RIGHT: Primary | ICD-10-CM

## 2022-09-23 PROCEDURE — 99024 POSTOP FOLLOW-UP VISIT: CPT | Performed by: PODIATRIST

## 2022-09-23 PROCEDURE — 73630 X-RAY EXAM OF FOOT: CPT

## 2022-09-23 NOTE — PROGRESS NOTES
Assessment/Plan:    No problem-specific Assessment & Plan notes found for this encounter  Diagnoses and all orders for this visit:    Bunion, right  -     X-ray foot right 3+ views; Future    Onychomycosis  -     ciclopirox (PENLAC) 8 % solution; Apply topically daily at bedtime      -x-rays three views taken reviewed and show good osseous fusion consolidation across the 1st MTPJ  -she has no further restrictions and may continue work and may continue in normal shoes   -she is to return p r n  for further care      Subjective:      Patient ID: Ousmane Son is a 74 y o  female  Patient seen and evaluated for right foot MTPJ fusion, she presents in sandals and has no pain, she is complaining of minimal swelling  Has no other issues  She is wondering what she can do for the nail fungus on her foot  The following portions of the patient's history were reviewed and updated as appropriate: allergies, current medications, past family history, past medical history, past social history, past surgical history and problem list     Review of Systems   Constitutional: Negative for chills and fever  HENT: Negative for ear pain and sore throat  Eyes: Negative for pain and visual disturbance  Respiratory: Negative for cough and shortness of breath  Cardiovascular: Negative for chest pain and palpitations  Gastrointestinal: Negative for abdominal pain and vomiting  Genitourinary: Negative for dysuria and hematuria  Musculoskeletal: Negative for arthralgias and back pain  Skin: Negative for color change and rash  Neurological: Negative for seizures and syncope  All other systems reviewed and are negative  Objective:      Ht 5' 3" (1 6 m)   Wt 88 kg (194 lb)   BMI 34 37 kg/m²          Physical Exam  Vitals reviewed  Constitutional:       Appearance: Normal appearance  She is obese  HENT:      Head: Normocephalic and atraumatic        Nose: Nose normal       Mouth/Throat: Mouth: Mucous membranes are moist    Eyes:      Conjunctiva/sclera: Conjunctivae normal       Pupils: Pupils are equal, round, and reactive to light  Pulmonary:      Effort: Pulmonary effort is normal    Musculoskeletal:         General: Swelling present  Comments: No range of motion of the 1st MPJ, good fusion site, minimal soft tissue swelling remaining  Skin:     Capillary Refill: Capillary refill takes less than 2 seconds  Neurological:      General: No focal deficit present  Mental Status: She is alert and oriented to person, place, and time

## 2022-11-14 DIAGNOSIS — I10 ESSENTIAL HYPERTENSION: ICD-10-CM

## 2022-11-14 RX ORDER — VALSARTAN AND HYDROCHLOROTHIAZIDE 80; 12.5 MG/1; MG/1
1 TABLET, FILM COATED ORAL DAILY
Qty: 90 TABLET | Refills: 1 | Status: SHIPPED | OUTPATIENT
Start: 2022-11-14 | End: 2022-11-14 | Stop reason: SDUPTHER

## 2022-11-14 RX ORDER — VALSARTAN AND HYDROCHLOROTHIAZIDE 80; 12.5 MG/1; MG/1
1 TABLET, FILM COATED ORAL DAILY
Qty: 90 TABLET | Refills: 1 | Status: SHIPPED | OUTPATIENT
Start: 2022-11-14 | End: 2022-11-17 | Stop reason: SDUPTHER

## 2022-11-15 DIAGNOSIS — E55.9 VITAMIN D DEFICIENCY: ICD-10-CM

## 2022-11-15 RX ORDER — ERGOCALCIFEROL 1.25 MG/1
50000 CAPSULE ORAL WEEKLY
Qty: 12 CAPSULE | Refills: 1 | Status: SHIPPED | OUTPATIENT
Start: 2022-11-15 | End: 2023-05-14

## 2022-11-17 DIAGNOSIS — I10 ESSENTIAL HYPERTENSION: ICD-10-CM

## 2022-11-17 RX ORDER — VALSARTAN AND HYDROCHLOROTHIAZIDE 80; 12.5 MG/1; MG/1
1 TABLET, FILM COATED ORAL DAILY
Qty: 90 TABLET | Refills: 1 | Status: SHIPPED | OUTPATIENT
Start: 2022-11-17 | End: 2023-05-16

## 2022-11-25 ENCOUNTER — TELEPHONE (OUTPATIENT)
Dept: FAMILY MEDICINE CLINIC | Facility: CLINIC | Age: 67
End: 2022-11-25

## 2022-11-25 NOTE — TELEPHONE ENCOUNTER
A request came from [employer], the request stated that Roney Cottrell was asking for a new 90 day supply of HCTZ 25 mg tabs  I did not see this on her med list  Is she supposed to be taking this?

## 2023-01-20 ENCOUNTER — APPOINTMENT (OUTPATIENT)
Dept: RADIOLOGY | Facility: CLINIC | Age: 68
End: 2023-01-20

## 2023-01-20 ENCOUNTER — OFFICE VISIT (OUTPATIENT)
Dept: FAMILY MEDICINE CLINIC | Facility: CLINIC | Age: 68
End: 2023-01-20

## 2023-01-20 VITALS
OXYGEN SATURATION: 97 % | WEIGHT: 189 LBS | BODY MASS INDEX: 33.49 KG/M2 | SYSTOLIC BLOOD PRESSURE: 124 MMHG | HEART RATE: 78 BPM | TEMPERATURE: 97.8 F | DIASTOLIC BLOOD PRESSURE: 86 MMHG | HEIGHT: 63 IN

## 2023-01-20 DIAGNOSIS — Z13.820 SCREENING FOR OSTEOPOROSIS: ICD-10-CM

## 2023-01-20 DIAGNOSIS — R09.81 SINUS CONGESTION: ICD-10-CM

## 2023-01-20 DIAGNOSIS — G89.29 TOE PAIN, CHRONIC, RIGHT: ICD-10-CM

## 2023-01-20 DIAGNOSIS — M79.674 TOE PAIN, CHRONIC, RIGHT: ICD-10-CM

## 2023-01-20 DIAGNOSIS — J45.909 UNCOMPLICATED ASTHMA, UNSPECIFIED ASTHMA SEVERITY, UNSPECIFIED WHETHER PERSISTENT: ICD-10-CM

## 2023-01-20 DIAGNOSIS — Z98.84 H/O BARIATRIC SURGERY: ICD-10-CM

## 2023-01-20 DIAGNOSIS — E78.2 MIXED HYPERLIPIDEMIA: ICD-10-CM

## 2023-01-20 DIAGNOSIS — E55.9 VITAMIN D DEFICIENCY: ICD-10-CM

## 2023-01-20 DIAGNOSIS — I10 ESSENTIAL HYPERTENSION: ICD-10-CM

## 2023-01-20 DIAGNOSIS — Z00.00 ANNUAL PHYSICAL EXAM: Primary | ICD-10-CM

## 2023-01-20 DIAGNOSIS — Z78.0 ASYMPTOMATIC POSTMENOPAUSAL ESTROGEN DEFICIENCY: ICD-10-CM

## 2023-01-20 DIAGNOSIS — E04.1 NONTOXIC UNINODULAR GOITER: ICD-10-CM

## 2023-01-20 PROBLEM — H93.A2 PULSATILE TINNITUS OF LEFT EAR: Status: RESOLVED | Noted: 2022-01-11 | Resolved: 2023-01-20

## 2023-01-20 RX ORDER — VALSARTAN AND HYDROCHLOROTHIAZIDE 80; 12.5 MG/1; MG/1
1 TABLET, FILM COATED ORAL DAILY
Qty: 90 TABLET | Refills: 1 | Status: SHIPPED | OUTPATIENT
Start: 2023-01-20 | End: 2023-07-19

## 2023-01-20 RX ORDER — FLUTICASONE PROPIONATE 50 MCG
2 SPRAY, SUSPENSION (ML) NASAL DAILY
Qty: 18.2 ML | Refills: 2 | Status: SHIPPED | OUTPATIENT
Start: 2023-01-20 | End: 2023-04-20

## 2023-01-20 NOTE — PATIENT INSTRUCTIONS

## 2023-01-20 NOTE — PROGRESS NOTES
[address] [address] [place] [address]    NAME: Agnieszka Brewer  AGE: 74 y o  SEX: female  : 1955     DATE: 2023     Assessment and Plan:     Problem List Items Addressed This Visit        Endocrine    Nontoxic uninodular goiter     No current complaints            Respiratory    Asthma    Sinus congestion     Will order the flonase          Relevant Medications    fluticasone (FLONASE) 50 mcg/act nasal spray       Cardiovascular and Mediastinum    Essential hypertension     Well controlled on the diovan hct         Relevant Medications    valsartan-hydrochlorothiazide (DIOVAN-HCT) 80-12 5 MG per tablet       Other    Hyperlipidemia    BMI 34 0-34 9,adult    Vitamin D deficiency    H/O bariatric surgery    Toe pain, chronic, right     S/p repair 2022 having pain will check x-ray          Relevant Orders    XR toe right great min 2 view    Asymptomatic postmenopausal estrogen deficiency    Relevant Orders    DXA bone density spine hip and pelvis    Screening for osteoporosis    Relevant Orders    DXA bone density spine hip and pelvis   Other Visit Diagnoses     Annual physical exam    -  Primary          Immunizations and preventive care screenings were discussed with patient today  Appropriate education was printed on patient's after visit summary  Counseling:  Injury prevention: discussed safety/seat belts, safety helmets, smoke detectors, carbon dioxide detectors, and smoking near bedding or upholstery  Exercise: the importance of regular exercise/physical activity was discussed  Recommend exercise 3-5 times per week for at least 30 minutes  BMI Counseling: Body mass index is 33 48 kg/m²   The BMI is above normal  Nutrition recommendations include decreasing portion sizes, encouraging healthy choices of fruits and vegetables, decreasing fast food intake, consuming healthier snacks, limiting drinks that contain sugar, moderation in carbohydrate intake, increasing intake of lean protein, reducing intake of saturated and trans fat and reducing intake of cholesterol  Exercise recommendations include moderate physical activity 150 minutes/week  No pharmacotherapy was ordered  Patient referred to PCP  Rationale for BMI follow-up plan is due to patient being overweight or obese  Return in 1 year (on 1/20/2024)  Chief Complaint:     Chief Complaint   Patient presents with   • Follow-up     Pain inright big toe after surgery      History of Present Illness:     Adult Annual Physical   Patient here for a comprehensive physical exam  The patient reports problems - right toe pain   Diet and Physical Activity  Diet/Nutrition: well balanced diet  Exercise: moderate cardiovascular exercise  Depression Screening  PHQ-2/9 Depression Screening         General Health  Sleep: sleeps well  Hearing: normal - bilateral   Vision: no vision problems, goes for regular eye exams, most recent eye exam <1 year ago and wears glasses  Dental: regular dental visits and brushes teeth twice daily  /GYN Health  Patient is: postmenopausal  Last menstrual period: 10 years   Contraceptive method: none  Review of Systems:     Review of Systems   Constitutional: Negative for activity change, appetite change, chills, diaphoresis, fatigue, fever and unexpected weight change  HENT: Positive for congestion and postnasal drip  Negative for ear pain, hearing loss, sinus pressure, sinus pain, sneezing and sore throat  Eyes: Negative for pain, redness and visual disturbance  Respiratory: Negative for cough and shortness of breath  Cardiovascular: Negative for chest pain and leg swelling  Gastrointestinal: Negative for abdominal pain, diarrhea, nausea and vomiting  Endocrine: Negative  Genitourinary: Negative  Musculoskeletal: Positive for arthralgias  Skin: Negative  Allergic/Immunologic: Negative      Neurological: Negative for dizziness and light-headedness  Hematological: Negative  Psychiatric/Behavioral: Negative for behavioral problems and dysphoric mood        Past Medical History:     Past Medical History:   Diagnosis Date   • Bunion, right 5/6/2022   • Chronic GERD 2/7/2018   • Hypertension    • Trochanteric bursitis 10/21/2019      Past Surgical History:     Past Surgical History:   Procedure Laterality Date   • GASTRECTOMY SLEEVE LAPAROSCOPIC     • [place] ARTHRODESIS GREAT TOE METATARSOPHALANGEAL JOINT Right 7/22/2022    Procedure: ARTHRODESIS / FUSION FOOT;  Surgeon: Randolph Bhatia DPM;  Location: [facility];  Service: Podiatry   • UTERINE FIBROID SURGERY        Social History:     Social History     Socioeconomic History   • Marital status: Single     Spouse name: None   • Number of children: None   • Years of education: None   • Highest education level: None   Occupational History   • None   Tobacco Use   • Smoking status: Never   • Smokeless tobacco: Never   Vaping Use   • Vaping Use: Never used   Substance and Sexual Activity   • Alcohol use: No   • Drug use: No   • Sexual activity: None   Other Topics Concern   • None   Social History Narrative    Lives with boyfriend     [address] [place] [address] ([facility])      Social Determinants of Health     Financial Resource Strain: Not on file   Food Insecurity: Not on file   Transportation Needs: Not on file   Physical Activity: Not on file   Stress: Not on file   Social Connections: Not on file   Intimate Partner Violence: Not on file   Housing Stability: Not on file      Family History:     Family History   Problem Relation Age of Onset   • Hypertension Mother    • Diabetes Mother    • No Known Problems Father    • No Known Problems Maternal Grandmother    • No Known Problems Maternal Grandfather    • No Known Problems Paternal Grandmother    • No Known Problems Paternal Grandfather    • No Known Problems Maternal Aunt    • No Known Problems Maternal Aunt    • No Known Problems Paternal Aunt    • No Known Problems Paternal Aunt    • No Known Problems Paternal Uncle    • No Known Problems Paternal Uncle    • No Known Problems Half-Sister    • No Known Problems Half-Sister       Current Medications:     Current Outpatient Medications   Medication Sig Dispense Refill   • fluticasone (FLONASE) 50 mcg/act nasal spray 2 sprays into each nostril daily 18 2 mL 2   • Nutritional Supplements (VITAMIN D BOOSTER PO) Take by mouth     • valsartan-hydrochlorothiazide (DIOVAN-HCT) 80-12 5 MG per tablet Take 1 tablet by mouth daily 90 tablet 1     No current facility-administered medications for this visit  Allergies: Allergies   Allergen Reactions   • Aspirin Diarrhea and Vomiting      Physical Exam:     /86 (BP Location: Right arm, Patient Position: Sitting)   Pulse 78   Temp 97 8 °F (36 6 °C) (Temporal)   Ht 5' 3" (1 6 m)   Wt 85 7 kg (189 lb)   SpO2 97%   BMI 33 48 kg/m²     Physical Exam  Vitals and nursing note reviewed  Constitutional:       General: She is not in acute distress  Appearance: She is well-developed  HENT:      Head: Normocephalic and atraumatic  Right Ear: Tympanic membrane normal       Left Ear: Tympanic membrane normal       Nose: Nose normal       Mouth/Throat:      Mouth: Mucous membranes are moist    Eyes:      Conjunctiva/sclera: Conjunctivae normal    Cardiovascular:      Rate and Rhythm: Normal rate and regular rhythm  Heart sounds: No murmur heard  Pulmonary:      Effort: Pulmonary effort is normal  No respiratory distress  Breath sounds: Normal breath sounds  Abdominal:      Palpations: Abdomen is soft  Tenderness: There is no abdominal tenderness  Musculoskeletal:         General: No swelling  Cervical back: Neck supple  Feet:    Skin:     General: Skin is warm and dry  Capillary Refill: Capillary refill takes less than 2 seconds  Neurological:      General: No focal deficit present        Mental Status: She is alert and oriented to person, place, and time     Psychiatric:         Mood and Affect: Mood normal           Genesis Arboleda, [address]

## 2023-03-03 ENCOUNTER — TELEPHONE (OUTPATIENT)
Dept: FAMILY MEDICINE CLINIC | Facility: CLINIC | Age: 68
End: 2023-03-03

## 2023-03-03 ENCOUNTER — DOCUMENTATION (OUTPATIENT)
Dept: FAMILY MEDICINE CLINIC | Facility: CLINIC | Age: 68
End: 2023-03-03

## 2023-03-03 DIAGNOSIS — J01.00 ACUTE NON-RECURRENT MAXILLARY SINUSITIS: Primary | ICD-10-CM

## 2023-03-03 RX ORDER — AZITHROMYCIN 250 MG/1
TABLET, FILM COATED ORAL
Qty: 6 TABLET | Refills: 0 | Status: SHIPPED | OUTPATIENT
Start: 2023-03-03 | End: 2023-03-07

## 2023-03-03 NOTE — TELEPHONE ENCOUNTER
Pt was seen by Jemma Crane about 2 weeks ago for head congestion, left ear pressure, and headaches and was given Flonase after the visit  Pt states that the Flonase isn't helping and she is still experiencing the same symptoms  She would like to know if there is any other medication she could take to help

## 2023-03-21 PROBLEM — Z13.820 SCREENING FOR OSTEOPOROSIS: Status: RESOLVED | Noted: 2023-01-20 | Resolved: 2023-03-21

## 2023-07-24 DIAGNOSIS — I10 ESSENTIAL HYPERTENSION: ICD-10-CM

## 2023-07-24 RX ORDER — VALSARTAN AND HYDROCHLOROTHIAZIDE 80; 12.5 MG/1; MG/1
1 TABLET, FILM COATED ORAL DAILY
Qty: 90 TABLET | Refills: 1 | Status: SHIPPED | OUTPATIENT
Start: 2023-07-24

## 2023-09-25 ENCOUNTER — APPOINTMENT (EMERGENCY)
Dept: RADIOLOGY | Facility: HOSPITAL | Age: 68
End: 2023-09-25
Payer: OTHER MISCELLANEOUS

## 2023-09-25 ENCOUNTER — HOSPITAL ENCOUNTER (EMERGENCY)
Facility: HOSPITAL | Age: 68
Discharge: HOME | End: 2023-09-25
Attending: EMERGENCY MEDICINE
Payer: OTHER MISCELLANEOUS

## 2023-09-25 VITALS
SYSTOLIC BLOOD PRESSURE: 166 MMHG | DIASTOLIC BLOOD PRESSURE: 90 MMHG | OXYGEN SATURATION: 98 % | HEART RATE: 75 BPM | TEMPERATURE: 98.3 F | RESPIRATION RATE: 20 BRPM

## 2023-09-25 DIAGNOSIS — M79.661 RIGHT CALF PAIN: ICD-10-CM
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
## 2023-09-25 PROCEDURE — 72125 CT NECK SPINE W/O DYE: CPT

## 2023-09-25 PROCEDURE — 70450 CT HEAD/BRAIN W/O DYE: CPT

## 2023-09-25 PROCEDURE — 99284 EMERGENCY DEPT VISIT MOD MDM: CPT

## 2023-09-25 PROCEDURE — G1004 CDSM NDSC: HCPCS

## 2023-09-25 PROCEDURE — 99284 EMERGENCY DEPT VISIT MOD MDM: CPT | Performed by: EMERGENCY MEDICINE

## 2023-09-25 PROCEDURE — 73564 X-RAY EXAM KNEE 4 OR MORE: CPT

## 2023-09-25 RX ORDER — ACETAMINOPHEN 325 MG/1
650 TABLET ORAL ONCE
Status: COMPLETED | OUTPATIENT
Start: 2023-09-25 | End: 2023-09-25

## 2023-09-25 RX ADMIN — ACETAMINOPHEN 650 MG: 325 TABLET, FILM COATED ORAL at 21:11

## 2023-09-25 NOTE — Clinical Note
Siva Chavez was seen and treated in our emergency department on 9/25/2023. As tolerated    Diagnosis: Right calf injury    Jon Pineda  may return to work on return date. She may return on this date: 09/28/2023         If you have any questions or concerns, please don't hesitate to call.       Jimmy Guzman, DO    ______________________________           _______________          _______________  Hospital Representative                              Date                                Time

## 2023-09-26 NOTE — ED ATTENDING ATTESTATION
9/25/2023  Macrina Mendoza DO, saw and evaluated the patient. I have discussed the patient with the resident/non-physician practitioner and agree with the resident's/non-physician practitioner's findings, Plan of Care, and MDM as documented in the resident's/non-physician practitioner's note, except where noted. All available labs and Radiology studies were reviewed. I was present for key portions of any procedure(s) performed by the resident/non-physician practitioner and I was immediately available to provide assistance. At this point I agree with the current assessment done in the Emergency Department. I have conducted an independent evaluation of this patient a history and physical is as follows:      79 yof with mechanical fall on wet floor. Past Medical History:   Diagnosis Date   • Bunion, right 5/6/2022   • Chronic GERD 2/7/2018   • Hypertension    • Trochanteric bursitis 10/21/2019     /90   Pulse 75   Temp 98.3 °F (36.8 °C) (Tympanic)   Resp 20   SpO2 98%   NAD, A&Ox4, hematoma to forehead, C spine NT w NROM, spine/back/abd/pelvis NT. UEs unremarkable. LLE unremarkable. RLE w pain in plantar flexion of foot, TTP in gastrocnemius muscle, NVI    CT head, cspine based on Jordanian rules  Likely partial gastrocnemius injury as can still plantar flex. Will need occ med and ortho f/u. NWB.        ED Course         Critical Care Time  Procedures

## 2023-09-26 NOTE — ED PROVIDER NOTES
History  Chief Complaint   Patient presents with   • Fall     C/o of falling at the time clock due to her shoe sticking to the floor. Hit her head no headache or thinners. C/o of pain only in her right ankle. 61-year-old female presents emergency department after sustaining a fall at work. Patient is employee at [employer]. Fall occurred while at work when patient was trying to sign in. Patient states her shoe, stuck to the ground and she struck her head against the wall. She also landed on her right knee is complaining of pain and tenderness. Patient does have bilateral lower extremity edema and does take a water pill. Patient denies any loss of consciousness, being on a blood thinner, ataxia. Patient does have difficulty with walking complains of pain in the right calf. Prior to Admission Medications   Prescriptions Last Dose Informant Patient Reported? Taking?    Nutritional Supplements (VITAMIN D BOOSTER PO)  Self Yes No   Sig: Take by mouth   fluticasone (FLONASE) 50 mcg/act nasal spray   No No   Si sprays into each nostril daily   valsartan-hydrochlorothiazide (DIOVAN-HCT) 80-12.5 MG per tablet   No No   Sig: TAKE 1 TABLET DAILY      Facility-Administered Medications: None       Past Medical History:   Diagnosis Date   • Bunion, right 2022   • Chronic GERD 2018   • Hypertension    • Trochanteric bursitis 10/21/2019       Past Surgical History:   Procedure Laterality Date   • GASTRECTOMY SLEEVE LAPAROSCOPIC     • [place] ARTHRODESIS GREAT TOE METATARSOPHALANGEAL JOINT Right 2022    Procedure: ARTHRODESIS / FUSION FOOT;  Surgeon: Matthew August DPM;  Location: [facility];  Service: Podiatry   • UTERINE FIBROID SURGERY         Family History   Problem Relation Age of Onset   • Hypertension Mother    • Diabetes Mother    • No Known Problems Father    • No Known Problems Maternal Grandmother    • No Known Problems Maternal Grandfather    • No Known Problems Paternal Grandmother    • No Known Problems Paternal Grandfather    • No Known Problems Maternal Aunt    • No Known Problems Maternal Aunt    • No Known Problems Paternal Aunt    • No Known Problems Paternal Aunt    • No Known Problems Paternal Uncle    • No Known Problems Paternal Uncle    • No Known Problems Half-Sister    • No Known Problems Half-Sister      I have reviewed and agree with the history as documented. E-Cigarette/Vaping   • E-Cigarette Use Never User      E-Cigarette/Vaping Substances   • Nicotine No    • THC No    • CBD No    • Flavoring No      Social History     Tobacco Use   • Smoking status: Never   • Smokeless tobacco: Never   Vaping Use   • Vaping Use: Never used   Substance Use Topics   • Alcohol use: No   • Drug use: No        Review of Systems   Musculoskeletal: Positive for gait problem (Right limp after fall). Neurological: Positive for headaches (Head pain). All other systems reviewed and are negative. Physical Exam  ED Triage Vitals   Temperature Pulse Respirations Blood Pressure SpO2   09/25/23 1915 09/25/23 1915 09/25/23 1915 09/25/23 1916 09/25/23 1915   98.3 °F (36.8 °C) 75 20 166/90 98 %      Temp Source Heart Rate Source Patient Position - Orthostatic VS BP Location FiO2 (%)   09/25/23 1915 -- 09/25/23 1915 09/25/23 1915 --   Tympanic  Sitting Right arm       Pain Score       --                    Orthostatic Vital Signs  Vitals:    09/25/23 1915 09/25/23 1916   BP:  166/90   Pulse: 75    Patient Position - Orthostatic VS: Sitting        Physical Exam  Vitals and nursing note reviewed. Constitutional:       General: She is not in acute distress. Appearance: She is well-developed. HENT:      Head: Normocephalic and atraumatic. Eyes:      Conjunctiva/sclera: Conjunctivae normal.   Cardiovascular:      Rate and Rhythm: Normal rate and regular rhythm. Heart sounds: No murmur heard. Pulmonary:      Effort: Pulmonary effort is normal. No respiratory distress. Breath sounds: Normal breath sounds. Abdominal:      Palpations: Abdomen is soft. Tenderness: There is no abdominal tenderness. Musculoskeletal:         General: No swelling. Cervical back: Neck supple. Right lower leg: Edema present. Left lower leg: Edema present. Legs:       Comments: No abnormal anterior posterior drawer bilateral knees. No tenderness along the tibial plateau. No increased laxity or pain with varus and valgus stress testing. Patellas are midline with no obvious patellar grinding. Plantarflexion is weak on the right side compared to left. Sensation is intact bilateral lower extremities. Strength is 4 out of 5 in plantarflexion all of her muscle strength is 5 out of 5 in bilateral lower extremities. Patient complains of point tenderness along the right medial gastrocnemius. Skin:     General: Skin is warm and dry. Capillary Refill: Capillary refill takes less than 2 seconds. Neurological:      General: No focal deficit present. Mental Status: She is alert and oriented to person, place, and time. GCS: GCS eye subscore is 4. GCS verbal subscore is 5. GCS motor subscore is 6. Cranial Nerves: Cranial nerves 2-12 are intact. No cranial nerve deficit. Sensory: No sensory deficit. Motor: No weakness. Coordination: Finger-Nose-Finger Test normal.      Comments: Sensation intact to light touch in bilateral upper and lower extremities. Psychiatric:         Mood and Affect: Mood normal.         ED Medications  Medications   acetaminophen (TYLENOL) tablet 650 mg (650 mg Oral Given 9/25/23 2111)       Diagnostic Studies  Results Reviewed     None                 XR knee 4+ vw right injury   Final Result by Freddie Taylor MD (09/26 9130)      No acute osseous abnormality. Degenerative changes as described.             Workstation performed: [id number]         CT head without contrast   Final Result by Agus Rider DO (09/25 2214)      No evidence of acute intracranial hemorrhage or mass effect. Workstation performed: [id number]         CT spine cervical without contrast   Final Result by Rosalee Saunders DO (09/25 2219)   No evidence of acute fracture, traumatic subluxation, or atlantooccipital dislocation. .                  Workstation performed: [id number]               Procedures  Procedures      ED Course  ED Course as of 09/26/23 1105   Mon Sep 25, 2023   2240 CT spine cervical without contrast  IMPRESSION:  No evidence of acute fracture, traumatic subluxation, or atlantooccipital dislocation. .                 Workstation performed: [id number]   [id number] CT head without contrast  IMPRESSION:     No evidence of acute intracranial hemorrhage or mass effect.                 Workstation performed: [id number]                                       Medical Decision Making  Patient sustained a fall with no loss of consciousness, not on blood thinners. This fall occurred while at work. Patient is a [employer] employee. CT head, CT cervical spine, right knee x-ray was performed at this time. All imaging studies were overall unremarkable with no acute abnormalities noted. Patient did have some difficulty walking in the emergency department and said that she had pain on the right medial gastrocnemius muscle. Possible concern for muscle tear. Patient was given crutches as well as Ace wrap. Requested that she applies the Ace wrap bandage to her right calf. Patient was instructed that she must follow-up with occupational medicine but it would also be extremely beneficial for her to follow-up with orthopedic surgery as well. Referrals were placed for both specialties, patient was provided strict return precautions to the emergency department, verbalized understanding of discharge instructions to follow-up with occupational medicine and subsequently discharged home in a stable condition.     Amount and/or Complexity of Data Reviewed  Radiology: ordered. Decision-making details documented in ED Course. Risk  OTC drugs. Disposition  Final diagnoses:   Fall, initial encounter   Right calf pain     Time reflects when diagnosis was documented in both MDM as applicable and the Disposition within this note     Time User Action Codes Description Comment    9/25/2023 11:14 PM Jr Papa Add [W19. STRC] Fall, initial encounter     9/25/2023 11:15 PM Ferd Papa Add [R08.123] Right calf pain       ED Disposition     ED Disposition   Discharge    Condition   Stable    Date/Time   Mon Sep 25, 2023 11:14 PM    Comment   Yasmeen Castle discharge to home/self care. Follow-up Information     Follow up With Specialties Details Why Contact Info Additional [address]  Call in 1 day  [address] [place] [address]  [phone]     [facility] Orthopedic Surgery Call in 1 day  [address] [address]  [phone] [facility], [address] [place], [place], [address] [place] [address]  Use Entrance A           Discharge Medication List as of 9/25/2023 11:20 PM      CONTINUE these medications which have NOT CHANGED    Details   fluticasone (FLONASE) 50 mcg/act nasal spray 2 sprays into each nostril daily, Starting Fri 1/20/2023, Until Thu 4/20/2023, Normal      Nutritional Supplements (VITAMIN D BOOSTER PO) Take by mouth, Historical Med      valsartan-hydrochlorothiazide (DIOVAN-HCT) 80-12.5 MG per tablet TAKE 1 TABLET DAILY, Starting Mon 7/24/2023, Normal               PDMP Review     None           ED Provider  Attending physically available and evaluated Yuliet Centeno. I managed the patient along with the ED Attending.     Electronically Signed by         Supa Carolina DO  09/26/23 3427

## 2023-09-26 NOTE — DISCHARGE INSTRUCTIONS
Bret White was seen and evaluated today in the emergency department over your concern of right knee pain. The workup that we performed showed right knee fracture or intracranial pathology. Please return to the emergency department if you experience inability to walk or any other signs and symptoms that may be concerning to you. Please follow-up with occupational medicine within 1 day. All questions were answered prior to discharge. Thank you for choosing . [facility] for your care. You must follow-up with occupational medicine as this injury occurred at work. If you do not, the injury may not be covered via insurance. Please follow-up with with orthopedic surgery after seeing occupational medicine.

## 2023-09-28 ENCOUNTER — TELEPHONE (OUTPATIENT)
Dept: OBGYN CLINIC | Facility: MEDICAL CENTER | Age: 68
End: 2023-09-28

## 2023-09-28 ENCOUNTER — OFFICE VISIT (OUTPATIENT)
Dept: OBGYN CLINIC | Facility: MEDICAL CENTER | Age: 68
End: 2023-09-28
Payer: OTHER MISCELLANEOUS

## 2023-09-28 VITALS — DIASTOLIC BLOOD PRESSURE: 82 MMHG | HEART RATE: 78 BPM | SYSTOLIC BLOOD PRESSURE: 157 MMHG

## 2023-09-28 DIAGNOSIS — M25.561 ACUTE PAIN OF RIGHT KNEE: Primary | ICD-10-CM

## 2023-09-28 DIAGNOSIS — M79.661 RIGHT CALF PAIN: ICD-10-CM

## 2023-09-28 PROCEDURE — 99243 OFF/OP CNSLTJ NEW/EST LOW 30: CPT | Performed by: PHYSICAL MEDICINE & REHABILITATION

## 2023-09-28 RX ORDER — BACLOFEN 10 MG/1
TABLET ORAL
COMMUNITY

## 2023-09-28 NOTE — PROGRESS NOTES
1. Acute pain of right knee        2. Right calf pain  Ambulatory Referral to Orthopedic Surgery        No orders of the defined types were placed in this encounter. Impression:  Right calf pain likely secondary to medial gastrocnemius strain with work date of injury as 9/25/2023. Patient's symptoms are improving. Normal Merritt-Capellan test.  She has good strength with resisted ankle plantarflexion. She has discomfort with passive ankle dorsiflexion. She presents in normal footwear today. She can try compression wrap. I encouraged her to continue to ambulate but avoid uneven terrain for 1-2 weeks. She can return to work. I will see her back if needed. Imaging Studies (I personally reviewed images in PACS and report):  Right knee x-rays most recent to this encounter reviewed. These images show moderate osteoarthritic changes that most affects the medial joint space. There is a sesamoid bone consistent with a fabella. The patient's pertinent emergency department/urgent care/referring physician's notes were reviewed. Return if symptoms worsen or fail to improve. Patient is in agreement with the above plan. HPI:  Amelia Jennings is a 79 y.o. female  who presents for evaluation of   Chief Complaint   Patient presents with   • Right Knee - Pain     Onset/Mechanism: 9/25/2023-the patient was trying to clock in and she fell. Location: In the proximal calf. Radiation: Stays there. Provocative: Walking. Severity: Improving. Associated Symptoms: Denies swelling or bruising. Treatment so far: Cold compression.     Following history reviewed and updated:  Past Medical History:   Diagnosis Date   • Bunion, right 5/6/2022   • Chronic GERD 2/7/2018   • Hypertension    • Trochanteric bursitis 10/21/2019     Past Surgical History:   Procedure Laterality Date   • GASTRECTOMY SLEEVE LAPAROSCOPIC     • ND ARTHRODESIS GREAT TOE METATARSOPHALANGEAL JOINT Right 7/22/2022    Procedure: ARTHRODESIS / FUSION FOOT;  Surgeon: Andrez Houser DPM;  Location: [facility];  Service: Podiatry   • UTERINE FIBROID SURGERY       Social History   Social History     Substance and Sexual Activity   Alcohol Use No     Social History     Substance and Sexual Activity   Drug Use No     Social History     Tobacco Use   Smoking Status Never   Smokeless Tobacco Never     Family History   Problem Relation Age of Onset   • Hypertension Mother    • Diabetes Mother    • No Known Problems Father    • No Known Problems Maternal Grandmother    • No Known Problems Maternal Grandfather    • No Known Problems Paternal Grandmother    • No Known Problems Paternal Grandfather    • No Known Problems Maternal Aunt    • No Known Problems Maternal Aunt    • No Known Problems Paternal Aunt    • No Known Problems Paternal Aunt    • No Known Problems Paternal Uncle    • No Known Problems Paternal Uncle    • No Known Problems Half-Sister    • No Known Problems Half-Sister      Allergies   Allergen Reactions   • Aspirin Diarrhea and Vomiting        Constitutional:  /82   Pulse 78    General: NAD. Eyes: Anicteric sclerae. Neck: Supple. Lungs: Unlabored breathing. Cardiovascular: No lower extremity edema. Skin: Intact without erythema. Neurologic: Sensation intact to light touch. Psychiatric: Mood and affect are appropriate. Right Ankle Exam     Tenderness   Right ankle tenderness location: Medial gastrocnemius. Swelling: none    Range of Motion   The patient has normal right ankle ROM. Muscle Strength   The patient has normal right ankle strength.     Other   Erythema: absent  Scars: absent  Sensation: normal  Pulse: present              Procedures

## 2023-09-28 NOTE — TELEPHONE ENCOUNTER
Called and spoke with   Jeff Chaves to verify claim with DOI: 09/25/23. Verified claim information. Claim is active and open.

## 2023-10-23 ENCOUNTER — OCCMED (OUTPATIENT)
Dept: URGENT CARE | Facility: CLINIC | Age: 68
End: 2023-10-23
Payer: OTHER MISCELLANEOUS

## 2023-10-23 ENCOUNTER — APPOINTMENT (OUTPATIENT)
Dept: RADIOLOGY | Facility: CLINIC | Age: 68
End: 2023-10-23
Payer: COMMERCIAL

## 2023-10-23 DIAGNOSIS — S39.012A STRAIN OF MUSCLE, FASCIA AND TENDON OF LOWER BACK, INITIAL ENCOUNTER: Primary | ICD-10-CM

## 2023-10-23 DIAGNOSIS — S39.012A STRAIN OF MUSCLE, FASCIA AND TENDON OF LOWER BACK, INITIAL ENCOUNTER: ICD-10-CM

## 2023-10-23 PROCEDURE — 72100 X-RAY EXAM L-S SPINE 2/3 VWS: CPT

## 2023-10-23 PROCEDURE — G0382 LEV 3 HOSP TYPE B ED VISIT: HCPCS | Performed by: PHYSICIAN ASSISTANT

## 2023-10-23 PROCEDURE — 99283 EMERGENCY DEPT VISIT LOW MDM: CPT | Performed by: PHYSICIAN ASSISTANT

## 2023-10-26 ENCOUNTER — TELEPHONE (OUTPATIENT)
Dept: FAMILY MEDICINE CLINIC | Facility: CLINIC | Age: 68
End: 2023-10-26

## 2023-10-26 NOTE — TELEPHONE ENCOUNTER
Patient needs a referral to ENT in the [facility]. Please call when ready. Ringing in her ears.

## 2023-10-27 ENCOUNTER — APPOINTMENT (OUTPATIENT)
Dept: URGENT CARE | Facility: CLINIC | Age: 68
End: 2023-10-27
Payer: COMMERCIAL

## 2023-10-27 DIAGNOSIS — H93.13 TINNITUS OF BOTH EARS: Primary | ICD-10-CM

## 2023-10-27 PROCEDURE — 99283 EMERGENCY DEPT VISIT LOW MDM: CPT

## 2023-10-27 PROCEDURE — G0382 LEV 3 HOSP TYPE B ED VISIT: HCPCS

## 2023-10-27 NOTE — TELEPHONE ENCOUNTER
Spoke to the patient, she is perfectly okay with an ENT in [facility].

## 2023-11-10 ENCOUNTER — APPOINTMENT (OUTPATIENT)
Dept: LAB | Facility: CLINIC | Age: 68
End: 2023-11-10
Payer: COMMERCIAL

## 2023-11-14 ENCOUNTER — TELEPHONE (OUTPATIENT)
Dept: FAMILY MEDICINE CLINIC | Facility: CLINIC | Age: 68
End: 2023-11-14

## 2023-11-14 DIAGNOSIS — Z23 ENCOUNTER FOR IMMUNIZATION: Primary | ICD-10-CM

## 2023-11-15 ENCOUNTER — IMMUNIZATIONS (OUTPATIENT)
Dept: FAMILY MEDICINE CLINIC | Facility: CLINIC | Age: 68
End: 2023-11-15
Payer: COMMERCIAL

## 2023-11-15 PROCEDURE — G0008 ADMIN INFLUENZA VIRUS VAC: HCPCS | Performed by: FAMILY MEDICINE

## 2023-11-15 PROCEDURE — 90662 IIV NO PRSV INCREASED AG IM: CPT | Performed by: FAMILY MEDICINE

## 2023-12-09 ENCOUNTER — OFFICE VISIT (OUTPATIENT)
Dept: URGENT CARE | Facility: CLINIC | Age: 68
End: 2023-12-09
Payer: COMMERCIAL

## 2023-12-09 ENCOUNTER — APPOINTMENT (OUTPATIENT)
Dept: URGENT CARE | Facility: CLINIC | Age: 68
End: 2023-12-09
Payer: COMMERCIAL

## 2023-12-09 VITALS
OXYGEN SATURATION: 99 % | RESPIRATION RATE: 18 BRPM | DIASTOLIC BLOOD PRESSURE: 88 MMHG | TEMPERATURE: 97.8 F | SYSTOLIC BLOOD PRESSURE: 156 MMHG | HEART RATE: 88 BPM

## 2023-12-09 DIAGNOSIS — J01.90 ACUTE NON-RECURRENT SINUSITIS, UNSPECIFIED LOCATION: Primary | ICD-10-CM

## 2023-12-09 PROCEDURE — 99283 EMERGENCY DEPT VISIT LOW MDM: CPT

## 2023-12-09 PROCEDURE — G0382 LEV 3 HOSP TYPE B ED VISIT: HCPCS

## 2023-12-09 RX ORDER — AMOXICILLIN AND CLAVULANATE POTASSIUM 875; 125 MG/1; MG/1
1 TABLET, FILM COATED ORAL EVERY 12 HOURS SCHEDULED
Qty: 14 TABLET | Refills: 0 | Status: SHIPPED | OUTPATIENT
Start: 2023-12-09 | End: 2023-12-16

## 2023-12-09 NOTE — PATIENT INSTRUCTIONS
Take Augmentin as prescribed. Flonase nasal spray. Warm compresses over sinuses  Over the counter saline nasal spray  Sinus rinses mixed with distilled water. Follow up with PCP in 3-5 days. Proceed to  ER if symptoms worsen. Eat yogurt with live and active cultures and/or take a probiotic at least 3 hours before or after antibiotic dose. Monitor stool for diarrhea and/or blood. If this occurs, contact primary care doctor ASAP. Sinusitis   AMBULATORY CARE:   Sinusitis  is inflammation or infection of your sinuses. Sinusitis is most often caused by a virus. Acute sinusitis may last up to 12 weeks. Chronic sinusitis lasts longer than 12 weeks. Recurrent sinusitis means you have 4 or more infections in 1 year. Common signs and symptoms:   Fever    Pain, pressure, redness, or swelling around the forehead, cheeks, or eyes    Thick yellow or green discharge from your nose    Tenderness when you touch your face over your sinuses    Dry cough that happens mostly at night or when you lie down    Headache and face pain that is worse when you lean forward    Tooth pain, or pain when you chew    Seek care immediately if:   You have trouble breathing or wheezing that is getting worse. You have a stiff neck, a fever, or a bad headache. You cannot open your eye. Your eyeball bulges out or you cannot move your eye. You are more sleepy than normal, or you notice changes in your ability to think, move, or talk. You have swelling of your forehead or scalp. Call your doctor if:   You have vision changes, such as double vision. Your eye and eyelid are red, swollen, and painful. Your symptoms do not improve or go away after 10 days. You have nausea and are vomiting. Your nose is bleeding. You have questions or concerns about your condition or care. Medicines: Your symptoms may go away on their own.  Your healthcare provider may recommend watchful waiting for up to 10 days before starting antibiotics. You may need any of the following:  Acetaminophen  decreases pain and fever. It is available without a doctor's order. Ask how much to take and how often to take it. Follow directions. Read the labels of all other medicines you are using to see if they also contain acetaminophen, or ask your doctor or pharmacist. Acetaminophen can cause liver damage if not taken correctly. Do not use more than 4 grams (4,000 milligrams) total of acetaminophen in one day. NSAIDs , such as ibuprofen, help decrease swelling, pain, and fever. This medicine is available with or without a doctor's order. NSAIDs can cause stomach bleeding or kidney problems in certain people. If you take blood thinner medicine, always ask your healthcare provider if NSAIDs are safe for you. Always read the medicine label and follow directions. Nasal steroid sprays  may help decrease inflammation in your nose and sinuses. Decongestants  help reduce swelling and drain mucus in the nose and sinuses. They may help you breathe easier. Antihistamines  help dry mucus in the nose and relieve sneezing. Antibiotics  help treat or prevent a bacterial infection. Self-care:   Rinse your sinuses as directed. Use a sinus rinse device to rinse your nasal passages with a saline (salt water) solution or distilled water. Do not use tap water. This will help thin the mucus in your nose and rinse away pollen and dirt. It will also help reduce swelling so you can breathe normally. Use a humidifier  to increase air moisture in your home. This may make it easier for you to breathe and help decrease your cough. Sleep with your head elevated. Place an extra pillow under your head before you go to sleep to help your sinuses drain. Drink liquids as directed. Ask your healthcare provider how much liquid to drink each day and which liquids are best for you. Liquids will thin the mucus in your nose and help it drain.  Avoid drinks that contain alcohol or caffeine. Do not smoke, and avoid secondhand smoke. Nicotine and other chemicals in cigarettes and cigars can make your symptoms worse. Ask your healthcare provider for information if you currently smoke and need help to quit. E-cigarettes or smokeless tobacco still contain nicotine. Talk to your healthcare provider before you use these products. Prevent the spread of germs:   Wash your hands often with soap and water. Wash your hands after you use the bathroom, change a child's diaper, or sneeze. Wash your hands before you prepare or eat food. Stay away from people who are sick. Some germs spread easily and quickly through contact. Follow up with your doctor as directed: You may be referred to an ear, nose, and throat specialist. Write down your questions so you remember to ask them during your visits. © Copyright [employer] 2022 Information is for End User's use only and may not be sold, redistributed or otherwise used for commercial purposes. All illustrations and images included in CareNotes® are the copyrighted property of [employer] Inc[employer] or [address] [place] [address]  The above information is an  only. It is not intended as medical advice for individual conditions or treatments. Talk to your doctor, nurse or pharmacist before following any medical regimen to see if it is safe and effective for you.

## 2023-12-09 NOTE — PROGRESS NOTES
[facility]        NAME: Olga Barrett is a 79 y.o. female  : 1955    MRN: [id number]  DATE: 2023  TIME: 11:21 AM    Assessment and Plan   Acute non-recurrent sinusitis, unspecified location [J01.90]  1. Acute non-recurrent sinusitis, unspecified location  amoxicillin-clavulanate (AUGMENTIN) 875-125 mg per tablet        Work note given. Patient Instructions     Take Augmentin as prescribed. Flonase nasal spray. Warm compresses over sinuses  Over the counter saline nasal spray  Sinus rinses mixed with distilled water. Follow up with PCP in 3-5 days. Proceed to  ER if symptoms worsen. Eat yogurt with live and active cultures and/or take a probiotic at least 3 hours before or after antibiotic dose. Monitor stool for diarrhea and/or blood. If this occurs, contact primary care doctor ASAP. Chief Complaint     Chief Complaint   Patient presents with    Sinusitis     Pt c/o sinus infection, facial pressure and pain that has been going on for past 3 weeks         History of Present Illness       The patient presents today with complaints of nasal congestion, sinus pain/pressure x 3 weeks. Had fevers initially but not recently. She has been taking OTC cough/cold medications with minimal relief. She is supposed to work this evening and is requesting a work note. Review of Systems   Review of Systems   Constitutional:  Negative for chills and fever. HENT:  Positive for congestion, sinus pressure and sinus pain. Negative for ear pain and sore throat. Respiratory:  Negative for cough. Gastrointestinal:  Negative for abdominal pain, diarrhea, nausea and vomiting. Musculoskeletal:  Negative for myalgias. Skin:  Negative for rash.          Current Medications       Current Outpatient Medications:     amoxicillin-clavulanate (AUGMENTIN) 875-125 mg per tablet, Take 1 tablet by mouth every 12 (twelve) hours for 7 days, Disp: 14 tablet, Rfl: 0    Nutritional Supplements (VITAMIN D BOOSTER PO), Take by mouth, Disp: , Rfl:     Valsartan (DIOVAN PO), 1 PO QD, Disp: , Rfl:     valsartan-hydrochlorothiazide (DIOVAN-HCT) 80-12.5 MG per tablet, TAKE 1 TABLET DAILY, Disp: 90 tablet, Rfl: 1    baclofen 10 mg tablet, , Disp: , Rfl:     fluticasone (FLONASE) 50 mcg/act nasal spray, 2 sprays into each nostril daily, Disp: 18.2 mL, Rfl: 2    Current Allergies     Allergies as of 12/09/2023 - Reviewed 12/09/2023   Allergen Reaction Noted    Aspirin Diarrhea and Vomiting 11/23/2016            The following portions of the patient's history were reviewed and updated as appropriate: allergies, current medications, past family history, past medical history, past social history, past surgical history and problem list.     Past Medical History:   Diagnosis Date    Bunion, right 5/6/2022    Chronic GERD 2/7/2018    Hypertension     Trochanteric bursitis 10/21/2019       Past Surgical History:   Procedure Laterality Date    GASTRECTOMY SLEEVE LAPAROSCOPIC      HI ARTHRODESIS GREAT TOE METATARSOPHALANGEAL JOINT Right 7/22/2022    Procedure: ARTHRODESIS / FUSION FOOT;  Surgeon: Deep Navas DPM;  Location: [facility];  Service: Podiatry    UTERINE FIBROID SURGERY         Family History   Problem Relation Age of Onset    Hypertension Mother     Diabetes Mother     No Known Problems Father     No Known Problems Maternal Grandmother     No Known Problems Maternal Grandfather     No Known Problems Paternal Grandmother     No Known Problems Paternal Grandfather     No Known Problems Maternal Aunt     No Known Problems Maternal Aunt     No Known Problems Paternal Aunt     No Known Problems Paternal Aunt     No Known Problems Paternal Uncle     No Known Problems Paternal Uncle     No Known Problems Half-Sister     No Known Problems Half-Sister          Medications have been verified.         Objective   /88   Pulse 88   Temp 97.8 °F (36.6 °C) (Temporal)   Resp 18   SpO2 99% Physical Exam     Physical Exam  Vitals and nursing note reviewed. Constitutional:       General: She is not in acute distress. Appearance: Normal appearance. She is not ill-appearing. HENT:      Head: Normocephalic and atraumatic. Right Ear: Tympanic membrane, ear canal and external ear normal.      Left Ear: Tympanic membrane, ear canal and external ear normal.      Nose: Congestion present. No rhinorrhea. Right Sinus: Maxillary sinus tenderness and frontal sinus tenderness present. Left Sinus: Maxillary sinus tenderness and frontal sinus tenderness present. Mouth/Throat:      Lips: Pink. Mouth: Mucous membranes are moist.      Pharynx: No oropharyngeal exudate or posterior oropharyngeal erythema. Tonsils: No tonsillar exudate. Eyes:      General: Vision grossly intact. Extraocular Movements: Extraocular movements intact. Pupils: Pupils are equal, round, and reactive to light. Cardiovascular:      Rate and Rhythm: Normal rate and regular rhythm. Heart sounds: Normal heart sounds. No murmur heard. Pulmonary:      Effort: Pulmonary effort is normal. No respiratory distress. Breath sounds: Normal breath sounds. No decreased air movement. No decreased breath sounds, wheezing, rhonchi or rales. Musculoskeletal:         General: Normal range of motion. Cervical back: Normal range of motion. Skin:     General: Skin is warm. Findings: No rash. Neurological:      Mental Status: She is alert and oriented to person, place, and time. Motor: Motor function is intact. Gait: Gait is intact.    Psychiatric:         Attention and Perception: Attention normal.         Mood and Affect: Mood normal.

## 2023-12-09 NOTE — LETTER
December 9, 2023     Patient: Daniel Shaffer   YOB: 1955   Date of Visit: 12/9/2023       To Whom it May Concern:    Daniel Shaffer was seen in my clinic on 12/9/2023. Please excuse from work on 12/9/23. If you have any questions or concerns, please don't hesitate to call.          Sincerely,          Karyle Shingles, CRNP        CC: No Recipients

## 2024-01-18 DIAGNOSIS — I10 ESSENTIAL HYPERTENSION: ICD-10-CM

## 2024-01-18 RX ORDER — VALSARTAN AND HYDROCHLOROTHIAZIDE 80; 12.5 MG/1; MG/1
1 TABLET, FILM COATED ORAL DAILY
Qty: 90 TABLET | Refills: 0 | Status: SHIPPED | OUTPATIENT
Start: 2024-01-18

## 2024-01-26 PROBLEM — R09.81 SINUS CONGESTION: Status: RESOLVED | Noted: 2023-01-20 | Resolved: 2024-01-26

## 2024-01-26 PROBLEM — M79.661 RIGHT CALF PAIN: Status: RESOLVED | Noted: 2023-09-28 | Resolved: 2024-01-26

## 2024-01-26 PROBLEM — Z78.0 ASYMPTOMATIC POSTMENOPAUSAL ESTROGEN DEFICIENCY: Status: RESOLVED | Noted: 2023-01-20 | Resolved: 2024-01-26

## 2024-01-26 PROBLEM — M25.561 ACUTE PAIN OF RIGHT KNEE: Status: RESOLVED | Noted: 2023-09-28 | Resolved: 2024-01-26

## 2024-01-29 DIAGNOSIS — Z12.31 ENCOUNTER FOR SCREENING MAMMOGRAM FOR MALIGNANT NEOPLASM OF BREAST: Primary | ICD-10-CM

## 2024-06-04 ENCOUNTER — APPOINTMENT (OUTPATIENT)
Dept: LAB | Facility: CLINIC | Age: 69
End: 2024-06-04
Payer: COMMERCIAL

## 2024-06-04 ENCOUNTER — APPOINTMENT (OUTPATIENT)
Dept: RADIOLOGY | Facility: CLINIC | Age: 69
End: 2024-06-04
Payer: COMMERCIAL

## 2024-06-04 ENCOUNTER — OFFICE VISIT (OUTPATIENT)
Dept: FAMILY MEDICINE CLINIC | Facility: CLINIC | Age: 69
End: 2024-06-04
Payer: COMMERCIAL

## 2024-06-04 VITALS
WEIGHT: 192.4 LBS | TEMPERATURE: 97.9 F | HEIGHT: 63 IN | BODY MASS INDEX: 34.09 KG/M2 | OXYGEN SATURATION: 98 % | SYSTOLIC BLOOD PRESSURE: 144 MMHG | HEART RATE: 77 BPM | DIASTOLIC BLOOD PRESSURE: 86 MMHG

## 2024-06-04 DIAGNOSIS — M79.645 THUMB PAIN, LEFT: Primary | ICD-10-CM

## 2024-06-04 DIAGNOSIS — Z98.84 H/O BARIATRIC SURGERY: ICD-10-CM

## 2024-06-04 DIAGNOSIS — E55.9 VITAMIN D DEFICIENCY: ICD-10-CM

## 2024-06-04 DIAGNOSIS — Z13.1 SCREENING FOR DIABETES MELLITUS (DM): ICD-10-CM

## 2024-06-04 DIAGNOSIS — E78.2 MIXED HYPERLIPIDEMIA: ICD-10-CM

## 2024-06-04 DIAGNOSIS — E04.1 NONTOXIC UNINODULAR GOITER: ICD-10-CM

## 2024-06-04 DIAGNOSIS — I10 ESSENTIAL HYPERTENSION: ICD-10-CM

## 2024-06-04 DIAGNOSIS — M25.551 RIGHT HIP PAIN: ICD-10-CM

## 2024-06-04 DIAGNOSIS — M79.642 PAIN IN LEFT HAND: Primary | ICD-10-CM

## 2024-06-04 DIAGNOSIS — K21.9 GASTROESOPHAGEAL REFLUX DISEASE WITHOUT ESOPHAGITIS: ICD-10-CM

## 2024-06-04 DIAGNOSIS — M79.642 PAIN IN LEFT HAND: ICD-10-CM

## 2024-06-04 LAB
25(OH)D3 SERPL-MCNC: 21.9 NG/ML (ref 30–100)
ALBUMIN SERPL BCP-MCNC: 3.9 G/DL (ref 3.5–5)
ALP SERPL-CCNC: 103 U/L (ref 34–104)
ALT SERPL W P-5'-P-CCNC: 15 U/L (ref 7–52)
ANION GAP SERPL CALCULATED.3IONS-SCNC: 9 MMOL/L (ref 4–13)
AST SERPL W P-5'-P-CCNC: 16 U/L (ref 13–39)
BASOPHILS # BLD AUTO: 0.06 THOUSANDS/ÂΜL (ref 0–0.1)
BASOPHILS NFR BLD AUTO: 1 % (ref 0–1)
BILIRUB SERPL-MCNC: 0.54 MG/DL (ref 0.2–1)
BUN SERPL-MCNC: 16 MG/DL (ref 5–25)
CALCIUM SERPL-MCNC: 9 MG/DL (ref 8.4–10.2)
CHLORIDE SERPL-SCNC: 104 MMOL/L (ref 96–108)
CHOLEST SERPL-MCNC: 177 MG/DL
CO2 SERPL-SCNC: 29 MMOL/L (ref 21–32)
CREAT SERPL-MCNC: 0.88 MG/DL (ref 0.6–1.3)
EOSINOPHIL # BLD AUTO: 0.2 THOUSAND/ÂΜL (ref 0–0.61)
EOSINOPHIL NFR BLD AUTO: 2 % (ref 0–6)
ERYTHROCYTE [DISTWIDTH] IN BLOOD BY AUTOMATED COUNT: 13 % (ref 11.6–15.1)
EST. AVERAGE GLUCOSE BLD GHB EST-MCNC: 100 MG/DL
GFR SERPL CREATININE-BSD FRML MDRD: 68 ML/MIN/1.73SQ M
GLUCOSE SERPL-MCNC: 86 MG/DL (ref 65–140)
HBA1C MFR BLD: 5.1 %
HCT VFR BLD AUTO: 42.4 % (ref 34.8–46.1)
HDLC SERPL-MCNC: 65 MG/DL
HGB BLD-MCNC: 13.6 G/DL (ref 11.5–15.4)
IMM GRANULOCYTES # BLD AUTO: 0.02 THOUSAND/UL (ref 0–0.2)
IMM GRANULOCYTES NFR BLD AUTO: 0 % (ref 0–2)
LDLC SERPL CALC-MCNC: 89 MG/DL (ref 0–100)
LYMPHOCYTES # BLD AUTO: 2.63 THOUSANDS/ÂΜL (ref 0.6–4.47)
LYMPHOCYTES NFR BLD AUTO: 28 % (ref 14–44)
MCH RBC QN AUTO: 32.5 PG (ref 26.8–34.3)
MCHC RBC AUTO-ENTMCNC: 32.1 G/DL (ref 31.4–37.4)
MCV RBC AUTO: 101 FL (ref 82–98)
MONOCYTES # BLD AUTO: 0.45 THOUSAND/ÂΜL (ref 0.17–1.22)
MONOCYTES NFR BLD AUTO: 5 % (ref 4–12)
NEUTROPHILS # BLD AUTO: 6.14 THOUSANDS/ÂΜL (ref 1.85–7.62)
NEUTS SEG NFR BLD AUTO: 64 % (ref 43–75)
NONHDLC SERPL-MCNC: 112 MG/DL
NRBC BLD AUTO-RTO: 0 /100 WBCS
PLATELET # BLD AUTO: 333 THOUSANDS/UL (ref 149–390)
PMV BLD AUTO: 10.7 FL (ref 8.9–12.7)
POTASSIUM SERPL-SCNC: 3.5 MMOL/L (ref 3.5–5.3)
PROT SERPL-MCNC: 7.3 G/DL (ref 6.4–8.4)
RBC # BLD AUTO: 4.18 MILLION/UL (ref 3.81–5.12)
SODIUM SERPL-SCNC: 142 MMOL/L (ref 135–147)
TRIGL SERPL-MCNC: 116 MG/DL
TSH SERPL DL<=0.05 MIU/L-ACNC: 1.1 UIU/ML (ref 0.45–4.5)
VIT B12 SERPL-MCNC: 98 PG/ML (ref 180–914)
WBC # BLD AUTO: 9.5 THOUSAND/UL (ref 4.31–10.16)

## 2024-06-04 PROCEDURE — 82607 VITAMIN B-12: CPT

## 2024-06-04 PROCEDURE — 80053 COMPREHEN METABOLIC PANEL: CPT

## 2024-06-04 PROCEDURE — 83036 HEMOGLOBIN GLYCOSYLATED A1C: CPT

## 2024-06-04 PROCEDURE — 36415 COLL VENOUS BLD VENIPUNCTURE: CPT

## 2024-06-04 PROCEDURE — 73140 X-RAY EXAM OF FINGER(S): CPT

## 2024-06-04 PROCEDURE — 82306 VITAMIN D 25 HYDROXY: CPT

## 2024-06-04 PROCEDURE — 73502 X-RAY EXAM HIP UNI 2-3 VIEWS: CPT

## 2024-06-04 PROCEDURE — 85025 COMPLETE CBC W/AUTO DIFF WBC: CPT

## 2024-06-04 PROCEDURE — 80061 LIPID PANEL: CPT

## 2024-06-04 PROCEDURE — 99214 OFFICE O/P EST MOD 30 MIN: CPT | Performed by: NURSE PRACTITIONER

## 2024-06-04 PROCEDURE — 84443 ASSAY THYROID STIM HORMONE: CPT

## 2024-06-04 RX ORDER — NICOTINE POLACRILEX 4 MG/1
20 GUM, CHEWING ORAL
Qty: 30 TABLET | Refills: 2 | Status: SHIPPED | OUTPATIENT
Start: 2024-06-04 | End: 2024-09-02

## 2024-06-04 NOTE — ASSESSMENT & PLAN NOTE
Patient with pain in the left first digit base suspect arthritis will check and x-ray of the thumb. DW patient RICE and will order the [facility]
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
## 2024-06-05 DIAGNOSIS — E55.9 VITAMIN D DEFICIENCY: Primary | ICD-10-CM

## 2024-06-05 DIAGNOSIS — E53.8 B12 DEFICIENCY: Primary | ICD-10-CM

## 2024-06-05 DIAGNOSIS — E53.8 B12 DEFICIENCY: ICD-10-CM

## 2024-06-05 RX ORDER — CYANOCOBALAMIN 1000 UG/ML
1000 INJECTION, SOLUTION INTRAMUSCULAR; SUBCUTANEOUS
Status: DISCONTINUED | OUTPATIENT
Start: 2024-06-05 | End: 2024-06-05

## 2024-06-05 RX ORDER — CYANOCOBALAMIN 1000 UG/ML
1000 INJECTION, SOLUTION INTRAMUSCULAR; SUBCUTANEOUS
Status: SHIPPED | OUTPATIENT
Start: 2024-06-05

## 2024-06-05 RX ORDER — ERGOCALCIFEROL 1.25 MG/1
50000 CAPSULE ORAL WEEKLY
Qty: 12 CAPSULE | Refills: 1 | Status: SHIPPED | OUTPATIENT
Start: 2024-06-05 | End: 2024-12-02

## 2024-06-06 ENCOUNTER — TELEPHONE (OUTPATIENT)
Age: 69
End: 2024-06-06

## 2024-06-06 NOTE — TELEPHONE ENCOUNTER
Pt called in to review lab results/PCP recommendations. Nurse scheduled pt with Nurse appt( B12 INJECTION), as PCP instructed.

## 2024-06-12 ENCOUNTER — TELEPHONE (OUTPATIENT)
Age: 69
End: 2024-06-12

## 2024-06-12 DIAGNOSIS — M25.551 RIGHT HIP PAIN: Primary | ICD-10-CM

## 2024-06-17 ENCOUNTER — OFFICE VISIT (OUTPATIENT)
Dept: OBGYN CLINIC | Facility: CLINIC | Age: 69
End: 2024-06-17
Payer: COMMERCIAL

## 2024-06-17 VITALS — WEIGHT: 192 LBS | BODY MASS INDEX: 34.02 KG/M2 | HEIGHT: 63 IN

## 2024-06-17 DIAGNOSIS — M18.12 ARTHRITIS OF CARPOMETACARPAL (CMC) JOINT OF LEFT THUMB: ICD-10-CM

## 2024-06-17 DIAGNOSIS — M65.342 TRIGGER FINGER, LEFT RING FINGER: Primary | ICD-10-CM

## 2024-06-17 PROCEDURE — 99244 OFF/OP CNSLTJ NEW/EST MOD 40: CPT | Performed by: STUDENT IN AN ORGANIZED HEALTH CARE EDUCATION/TRAINING PROGRAM

## 2024-06-17 PROCEDURE — 20550 NJX 1 TENDON SHEATH/LIGAMENT: CPT | Performed by: STUDENT IN AN ORGANIZED HEALTH CARE EDUCATION/TRAINING PROGRAM

## 2024-06-17 PROCEDURE — 20600 DRAIN/INJ JOINT/BURSA W/O US: CPT | Performed by: STUDENT IN AN ORGANIZED HEALTH CARE EDUCATION/TRAINING PROGRAM

## 2024-06-17 RX ORDER — ROPIVACAINE HYDROCHLORIDE 5 MG/ML
1 INJECTION, SOLUTION EPIDURAL; INFILTRATION; PERINEURAL
Status: COMPLETED | OUTPATIENT
Start: 2024-06-17 | End: 2024-06-17

## 2024-06-17 RX ORDER — BETAMETHASONE SODIUM PHOSPHATE AND BETAMETHASONE ACETATE 3; 3 MG/ML; MG/ML
6 INJECTION, SUSPENSION INTRA-ARTICULAR; INTRALESIONAL; INTRAMUSCULAR; SOFT TISSUE
Status: COMPLETED | OUTPATIENT
Start: 2024-06-17 | End: 2024-06-17

## 2024-06-17 RX ADMIN — BETAMETHASONE SODIUM PHOSPHATE AND BETAMETHASONE ACETATE 6 MG: 3; 3 INJECTION, SUSPENSION INTRA-ARTICULAR; INTRALESIONAL; INTRAMUSCULAR; SOFT TISSUE at 11:00

## 2024-06-17 RX ADMIN — ROPIVACAINE HYDROCHLORIDE 1 ML: 5 INJECTION, SOLUTION EPIDURAL; INFILTRATION; PERINEURAL at 11:00

## 2024-06-17 NOTE — PROGRESS NOTES
ORTHOPAEDIC HAND, WRIST, AND ELBOW OFFICE  VISIT      ASSESSMENT/PLAN:      Diagnoses and all orders for this visit:    Trigger finger, left ring finger  -     Hand/upper extremity injection: L ring A1  -     ropivacaine (NAROPIN) 0.5 % injection 1 mL  -     betamethasone acetate-betamethasone sodium phosphate (CELESTONE) injection 6 mg    Arthritis of carpometacarpal (CMC) joint of left thumb  -     Ambulatory Referral to Orthopedic Surgery  -     Ambulatory Referral to PT/OT Hand Therapy; Future  -     Small joint arthrocentesis: L thumb CMC  -     ropivacaine (NAROPIN) 0.5 % injection 1 mL  -     betamethasone acetate-betamethasone sodium phosphate (CELESTONE) injection 6 mg          69 y.o. female with left thumb CMC OA and left ring TF  Treatment options and expected outcomes were discussed.  The patient verbalized understanding of exam findings and treatment plan.   The patient was given the opportunity to ask questions.  Questions were answered to the patient's satisfaction.  The patient decided to move forward with steroid injx to both areas.  In addition, will try Voltaren Gel, Comfort Cool brace and therapy to learn HEP.      Follow Up:  6 weeks PRN      To Do Next Visit:  Re-evaluation of current issue      Discussions:  Trigger Finger: The anatomy and physiology of trigger finger was discussed with the patient today in the office.  Edema and increased contact pressure within the flexor tendons at the A1 pulley can cause pain, crepitation, and triggering or locking of the digit resulting in limitation of function.  Symptoms can occur at anytime but are typically worse in the morning or after a brief rest from repetitive activity.  Treatment options include resting/nighttime MP blocking splints to decrease edema, oral anti-inflammatory medications, home or formal therapy exercises, up to 2 steroid injections within the tendon sheath, or surgical release.  While majority of patients do respond to  conservative treatment, up to 20% may require surgical release.  and Thumb CMC Arthritis: The anatomy and physiology of carpometacarpal joint arthritis was discussed with the patient today in the office.  Deterioration of the articular cartilage eventually leads to hypermobility at the thumb CMC joint, resulting in joint subluxation, osteophyte formation, cystic changes within the trapezium and base of the first metacarpal, as well as subchondral sclerosis.  Eventually, pain, limited mobility, and compensatory hyperextension at the metacarpophalangeal joint may develop.  While normal activity and usage of the thumb joint may provide a painful experience to the patient, this typically does not result in damage to the thumb or hand.  Treatment options include resting thumb spica splints to decreased joint edema, pain, and inflammation.  Therapy exercises to strengthen the thenar musculature may relieve pain, but do not alter the overall continued development of osteoarthritis.  Oral medications, topical medications, corticosteroid injections may decrease pain and increase overall function.  Eventually, approximately 5% of patients may require surgical intervention.       Freddy Luna MD  Attending, Orthopaedic Surgery  Hand, Wrist, and Elbow Surgery  [facility]    ______________________________________________________________________________________________    CHIEF COMPLAINT:  Chief Complaint   Patient presents with   • Left Hand - Pain       SUBJECTIVE:  Patient is a 69 y.o. RHD female who presents today for evaluation and treatment of left thumb pain. Pt states she's had pain here for 3-4 weeks now. No trauma at that time but did have a fall 3 months ago. Describes pain that extends from palm into ring finger as well. No locking/clicking, but the finger does feel tight.    Referred for evaluation by ROVERTO Jamison    Occupation: PCA     I have personally reviewed all the relevant  PMH, PSH, SH, FH, Medications and allergies      PAST MEDICAL HISTORY:  Past Medical History:   Diagnosis Date   • Bunion, right 5/6/2022   • Chronic GERD 2/7/2018   • Hypertension    • Trochanteric bursitis 10/21/2019       PAST SURGICAL HISTORY:  Past Surgical History:   Procedure Laterality Date   • GASTRECTOMY SLEEVE LAPAROSCOPIC     • [place] ARTHRODESIS GREAT TOE METATARSOPHALANGEAL JOINT Right 7/22/2022    Procedure: ARTHRODESIS / FUSION FOOT;  Surgeon: Yan Hayden DPM;  Location: [facility] OR;  Service: Podiatry   • UTERINE FIBROID SURGERY         FAMILY HISTORY:  Family History   Problem Relation Age of Onset   • Hypertension Mother    • Diabetes Mother    • No Known Problems Father    • No Known Problems Maternal Grandmother    • No Known Problems Maternal Grandfather    • No Known Problems Paternal Grandmother    • No Known Problems Paternal Grandfather    • No Known Problems Maternal Aunt    • No Known Problems Maternal Aunt    • No Known Problems Paternal Aunt    • No Known Problems Paternal Aunt    • No Known Problems Paternal Uncle    • No Known Problems Paternal Uncle    • No Known Problems Half-Sister    • No Known Problems Half-Sister        SOCIAL HISTORY:  Social History     Tobacco Use   • Smoking status: Never   • Smokeless tobacco: Never   Vaping Use   • Vaping status: Never Used   Substance Use Topics   • Alcohol use: No   • Drug use: No       MEDICATIONS:    Current Outpatient Medications:   •  Diclofenac Sodium (VOLTAREN) 1 %, Apply 2 g topically 2 (two) times a day for 15 days, Disp: 60 g, Rfl: 0  •  ergocalciferol (VITAMIN D2) 50,000 units, Take 1 capsule (50,000 Units total) by mouth once a week, Disp: 12 capsule, Rfl: 1  •  Nutritional Supplements (VITAMIN D BOOSTER PO), Take by mouth, Disp: , Rfl:   •  Omeprazole 20 MG TBEC, Take 1 tablet (20 mg total) by mouth daily before breakfast, Disp: 30 tablet, Rfl: 2  •  valsartan-hydrochlorothiazide (DIOVAN-HCT) 80-12.5 MG per tablet,  TAKE 1 TABLET DAILY, Disp: 90 tablet, Rfl: 0  •  fluticasone (FLONASE) 50 mcg/act nasal spray, 2 sprays into each nostril daily, Disp: 18.2 mL, Rfl: 2  •  loratadine (CLARITIN) 10 mg tablet, Take 10 mg by mouth daily, Disp: , Rfl:     Current Facility-Administered Medications:   •  cyanocobalamin injection 1,000 mcg, 1,000 mcg, Intramuscular, Q30 Days,     ALLERGIES:  Allergies   Allergen Reactions   • Aspirin Diarrhea and Vomiting           REVIEW OF SYSTEMS:  Musculoskeletal:        As noted in HPI.   All other systems reviewed and are negative.    VITALS:  There were no vitals filed for this visit.    LABS:  HgA1c:   Lab Results   Component Value Date    HGBA1C 5.1 06/04/2024     BMP:   Lab Results   Component Value Date    CALCIUM 9.0 06/04/2024    K 3.5 06/04/2024    CO2 29 06/04/2024     06/04/2024    BUN 16 06/04/2024    CREATININE 0.88 06/04/2024       _____________________________________________________  PHYSICAL EXAMINATION:  General: Well developed and well nourished, alert & oriented x 3, appears comfortable  Psychiatric: Normal  HEENT: Normocephalic, Atraumatic Trachea Midline, No torticollis  Pulmonary: No audible wheezing or respiratory distress   Abdomen/GI: Non tender, non distended   Cardiovascular: No pitting edema, 2+ radial pulse   Skin: No masses, erythema, lacerations, fluctation, ulcerations  Neurovascular: Sensation Intact to the Median, Ulnar, Radial Nerve, Motor Intact to the Median, Ulnar, Radial Nerve, and Pulses Intact  Musculoskeletal: Normal, except as noted in detailed exam and in HPI.      MUSCULOSKELETAL EXAMINATION:  Left Hand:  TTP thumb CMC joint.  + CMC grind.  Nontender 1st dorsal compartment and RC joint.  Mild ttp A1 pulley ring finger.  No triggering today.  Mild click felt.  Pt describes pain along the dorsal PIP with full fist formation.   Brisk capillary refill.    ___________________________________________________  STUDIES REVIEWED:  Xrays of the left thumb were  "reviewed and independently interpreted in PACS by Dr. Luna and demonstrate no evidence of acute or subacute osseous abnormalities. Pt noted to have significant thumb CMC OA. In addition, noted to have cyst located in the ring middle phalanx.          PROCEDURES PERFORMED:  Small joint arthrocentesis: L thumb CMC  [place] Protocol:  Consent: Verbal consent obtained.  Risks and benefits: risks, benefits and alternatives were discussed  Consent given by: patient  Time out: Immediately prior to procedure a \"time out\" was called to verify the correct patient, procedure, equipment, support staff and site/side marked as required.  Patient understanding: patient states understanding of the procedure being performed  Patient identity confirmed: verbally with patient  Supporting Documentation  Indications: pain   Procedure Details  Location: thumb - L thumb CMC  Needle size: 25 G  Ultrasound guidance: no  Approach: volar  Medications administered: 6 mg betamethasone acetate-betamethasone sodium phosphate 6 (3-3) mg/mL; 1 mL ropivacaine 0.5 %    Patient tolerance: patient tolerated the procedure well with no immediate complications  Dressing:  Sterile dressing applied      Hand/upper extremity injection: L ring A1  Universal Protocol:  Consent: Verbal consent obtained.  Risks and benefits: risks, benefits and alternatives were discussed  Consent given by: patient  Time out: Immediately prior to procedure a \"time out\" was called to verify the correct patient, procedure, equipment, support staff and site/side marked as required.  Patient understanding: patient states understanding of the procedure being performed  Site marked: the operative site was marked  Patient identity confirmed: verbally with patient  Supporting Documentation  Indications: tendon swelling   Procedure Details  Condition:trigger finger Location: ring finger - L ring A1   Preparation: Patient was prepped and draped in the usual sterile fashion  Needle " size: 25 G  Approach: volar  Medications administered: 6 mg betamethasone acetate-betamethasone sodium phosphate 6 (3-3) mg/mL; 1 mL ropivacaine 0.5 %  Patient tolerance: patient tolerated the procedure well with no immediate complications  Dressing:  Sterile dressing applied              _____________________________________________________      Scribe Attestation    I,:  Debra Miner PA-C am acting as a scribe while in the presence of the attending physician.:       I,:  Freddy Luna MD personally performed the services described in this documentation    as scribed in my presence.:

## 2024-06-24 DIAGNOSIS — E55.9 VITAMIN D DEFICIENCY: ICD-10-CM

## 2024-06-24 RX ORDER — ERGOCALCIFEROL 1.25 MG/1
50000 CAPSULE ORAL WEEKLY
Qty: 12 CAPSULE | Refills: 1 | Status: SHIPPED | OUTPATIENT
Start: 2024-06-24 | End: 2024-12-21

## 2024-07-22 ENCOUNTER — OFFICE VISIT (OUTPATIENT)
Dept: OBGYN CLINIC | Facility: MEDICAL CENTER | Age: 69
End: 2024-07-22
Payer: COMMERCIAL

## 2024-07-22 VITALS
WEIGHT: 193.2 LBS | DIASTOLIC BLOOD PRESSURE: 84 MMHG | HEIGHT: 63 IN | SYSTOLIC BLOOD PRESSURE: 136 MMHG | BODY MASS INDEX: 34.23 KG/M2

## 2024-07-22 DIAGNOSIS — Z12.31 ENCOUNTER FOR SCREENING MAMMOGRAM FOR BREAST CANCER: ICD-10-CM

## 2024-07-22 DIAGNOSIS — Z12.4 CERVICAL CANCER SCREENING: ICD-10-CM

## 2024-07-22 DIAGNOSIS — Z11.51 SCREENING FOR HPV (HUMAN PAPILLOMAVIRUS): ICD-10-CM

## 2024-07-22 DIAGNOSIS — Z01.419 ENCOUNTER FOR GYNECOLOGICAL EXAMINATION (GENERAL) (ROUTINE) WITHOUT ABNORMAL FINDINGS: Primary | ICD-10-CM

## 2024-07-22 PROCEDURE — G0476 HPV COMBO ASSAY CA SCREEN: HCPCS | Performed by: CLINICAL NURSE SPECIALIST

## 2024-07-22 PROCEDURE — S0610 ANNUAL GYNECOLOGICAL EXAMINA: HCPCS | Performed by: CLINICAL NURSE SPECIALIST

## 2024-07-22 PROCEDURE — G0145 SCR C/V CYTO,THINLAYER,RESCR: HCPCS | Performed by: CLINICAL NURSE SPECIALIST

## 2024-07-22 NOTE — PROGRESS NOTES
Subjective:        Yasmeen Castle is a 69 y.o. female. Here for Gynecologic Exam (Patient is here for a yearly exam. Postmenopausal/Concerns: none/Sex active: yes/Last pap: 5/12/18-neg/Mammogram: 6/13/22-neg, next one scheduled for 11/4/24/Colonoscopy: 4/5/22-repeat in 10 years/Dexa: already ordered by PCP/No family history of breast, colon, or ovarian cancer.)      GYN HPI  Menstrual cycle:  -denies PMB or significant hot flashes   Vaginal c/o: Denies  Urinary c/o: Denies  Breast complaints:denies  She does do self breast Exams    Sexually active: yes but not often  Contraception: N?A  She reports she feels safe at home.     Dietary calcium/vit D  intake: adequate  Lifestyle: Exercises regularly    HEALTH MAINTENANCE SCREENINGS:    Last Papanicolaou test:  05/12/2018   History of abnormal pap: No  Last mammogram:  06/13/2022  Last Colon Cancer Screening: colonoscopy: 04/05/2022 Cologuard:Not on file. Recall 10y  Last Bone Density scan: none yet- has order for same    Hereditary Cancer Screening  Cancer-related family history is not on file.       Substance Abuse Screening Completed. See hx and flowsheet.    The following portions of the patient's history were reviewed and updated as appropriate: allergies, current medications, past family history, past medical history, past social history, past surgical history, and problem list.       Review of Systems   Constitutional:  Negative for appetite change, chills, fatigue, fever and unexpected weight change.   HENT: Negative.     Eyes: Negative.    Respiratory:  Negative for chest tightness and shortness of breath.    Cardiovascular:  Negative for chest pain and palpitations.   Gastrointestinal:  Negative for abdominal pain, constipation and vomiting.   Endocrine: Negative for cold intolerance and heat intolerance.   Genitourinary:         As per HPI   Musculoskeletal:  Negative for back pain, joint swelling and neck pain.   Skin:  Negative for color change and  "rash.   Neurological:  Negative for dizziness, weakness and numbness.   Hematological:  Does not bruise/bleed easily.   Psychiatric/Behavioral: Negative.               Objective:  /84 (BP Location: Right arm, Patient Position: Sitting, Cuff Size: Adult)   Ht 5' 2.75\" (1.594 m)   Wt 87.6 kg (193 lb 3.2 oz)   BMI 34.50 kg/m²        Physical Exam  Constitutional:       General: She is not in acute distress.     Appearance: Normal appearance.   Genitourinary:      Vulva and rectum normal.      No lesions in the vagina.      Right Labia: No rash or lesions.     Left Labia: No lesions or rash.     No vaginal discharge, erythema, tenderness or bleeding.        Right Adnexa: not tender and no mass present.     Left Adnexa: not tender and no mass present.     No cervical motion tenderness, discharge or friability.      Uterus is not enlarged or tender.      No urethral prolapse present.      Pelvic exam was performed with patient in the lithotomy position.   Breasts:     Breasts are symmetrical.      Right: No inverted nipple, mass, nipple discharge, skin change or tenderness.      Left: No inverted nipple, mass, nipple discharge, skin change or tenderness.   HENT:      Head: Normocephalic and atraumatic.   Cardiovascular:      Rate and Rhythm: Normal rate.      Heart sounds: No murmur heard.  Pulmonary:      Effort: Pulmonary effort is normal.      Breath sounds: Normal breath sounds.   Abdominal:      General: There is no distension.      Palpations: Abdomen is soft.      Tenderness: There is no abdominal tenderness.   Musculoskeletal:         General: Normal range of motion.      Cervical back: Normal range of motion.   Lymphadenopathy:      Cervical: No cervical adenopathy.   Neurological:      Mental Status: She is alert and oriented to person, place, and time.   Skin:     General: Skin is warm and dry.   Psychiatric:         Mood and Affect: Mood normal.         Behavior: Behavior normal.   Vitals reviewed. "             Assessment/Plan:           ANNUAL GYN EXAM- Primary  Annual GYN examination completed today.   Health maintenance reviewed/updated as appropriate  Cervical cancer screen: Previous pap smears and ASCCP screening guidelines have been reviewed. Pap collected due to no pap in 6 yrs. If nml then no further pap screening needed.  Breast Health: Encouraged regular self breast exams. Mammo has active order- enc to schedule.  Colon cancer screening: Up to date  Bone density screening: has active order- enc to schedule    Risk prevention and anticipatory guidance provided including:  Age related Calcium and vitamin D intake  Dietary and lifestyle recommendations based on her age and weight. body mass index is 34.5 kg/m²..    Tobacco and alcohol use, intervention ordered if applicable.   Condom use for prevention of STI's. declines STI Screening.  Contraception:  N/A- post menopause    Problem List Items Addressed This Visit    None  Visit Diagnoses       Encounter for gynecological examination (general) (routine) without abnormal findings    -  Primary    Cervical cancer screening        Encounter for screening mammogram for breast cancer                No orders of the defined types were placed in this encounter.

## 2024-07-23 LAB
HPV HR 12 DNA CVX QL NAA+PROBE: NEGATIVE
HPV16 DNA CVX QL NAA+PROBE: NEGATIVE
HPV18 DNA CVX QL NAA+PROBE: NEGATIVE

## 2024-07-26 ENCOUNTER — OFFICE VISIT (OUTPATIENT)
Dept: OBGYN CLINIC | Facility: MEDICAL CENTER | Age: 69
End: 2024-07-26
Payer: COMMERCIAL

## 2024-07-26 VITALS
DIASTOLIC BLOOD PRESSURE: 84 MMHG | BODY MASS INDEX: 36.47 KG/M2 | HEART RATE: 67 BPM | SYSTOLIC BLOOD PRESSURE: 148 MMHG | WEIGHT: 198.2 LBS | HEIGHT: 62 IN

## 2024-07-26 DIAGNOSIS — M70.61 GREATER TROCHANTERIC BURSITIS OF RIGHT HIP: Primary | ICD-10-CM

## 2024-07-26 DIAGNOSIS — M25.551 RIGHT HIP PAIN: ICD-10-CM

## 2024-07-26 LAB
LAB AP GYN PRIMARY INTERPRETATION: NORMAL
Lab: NORMAL

## 2024-07-26 PROCEDURE — 20610 DRAIN/INJ JOINT/BURSA W/O US: CPT

## 2024-07-26 PROCEDURE — 99213 OFFICE O/P EST LOW 20 MIN: CPT | Performed by: ORTHOPAEDIC SURGERY

## 2024-07-26 RX ORDER — LIDOCAINE HYDROCHLORIDE 10 MG/ML
2 INJECTION, SOLUTION INFILTRATION; PERINEURAL
Status: COMPLETED | OUTPATIENT
Start: 2024-07-26 | End: 2024-07-26

## 2024-07-26 RX ORDER — BUPIVACAINE HYDROCHLORIDE 2.5 MG/ML
2 INJECTION, SOLUTION INFILTRATION; PERINEURAL
Status: COMPLETED | OUTPATIENT
Start: 2024-07-26 | End: 2024-07-26

## 2024-07-26 RX ORDER — BETAMETHASONE SODIUM PHOSPHATE AND BETAMETHASONE ACETATE 3; 3 MG/ML; MG/ML
12 INJECTION, SUSPENSION INTRA-ARTICULAR; INTRALESIONAL; INTRAMUSCULAR; SOFT TISSUE
Status: COMPLETED | OUTPATIENT
Start: 2024-07-26 | End: 2024-07-26

## 2024-07-26 RX ADMIN — BUPIVACAINE HYDROCHLORIDE 2 ML: 2.5 INJECTION, SOLUTION INFILTRATION; PERINEURAL at 11:30

## 2024-07-26 RX ADMIN — BETAMETHASONE SODIUM PHOSPHATE AND BETAMETHASONE ACETATE 12 MG: 3; 3 INJECTION, SUSPENSION INTRA-ARTICULAR; INTRALESIONAL; INTRAMUSCULAR; SOFT TISSUE at 11:30

## 2024-07-26 RX ADMIN — LIDOCAINE HYDROCHLORIDE 2 ML: 10 INJECTION, SOLUTION INFILTRATION; PERINEURAL at 11:30

## 2024-07-26 NOTE — PROGRESS NOTES
Assessment:   Diagnosis ICD-10-CM Associated Orders   1. Greater trochanteric bursitis of right hip  M70.61 Large joint arthrocentesis      2. Right hip pain  M25.551 Ambulatory referral to Orthopedic Surgery          Plan:  69 y.o. female with greater trochanteric bursitis of the right hip  History of fall with injuries to right hip and left hand   X-rays show no acute fractures or dislocations   TTP over lateral right hip   Offered, accepted and provided with CSI to right GTB today   Patient tolerated procedure well   Follow up in about 3 months     The above stated was discussed in layman's terms and the patient expressed understanding.  All questions were answered to the patient's satisfaction.     To do next visit:  Return in about 3 months (around 10/26/2024) for right hip.      Subjective:   Yasmeen Castle is a 69 y.o. female who presents for initial evaluation of right hip pain.  Patient explains a few months ago she sustained a fall, injuring her left hand and right hip.  She admits to pain over lateral aspect of right hip worse with ambulation and when pressure applied. She denies any groin or anterior thigh pain.  Tenderness to palpation over right GT bursa.  Patient offered, accepted, and provided with injection to right GTB.    Pain score 9/10          Review of systems negative unless otherwise specified in HPI    Past Medical History:   Diagnosis Date    Bunion, right 5/6/2022    Chronic GERD 2/7/2018    Hypertension     Trochanteric bursitis 10/21/2019       Past Surgical History:   Procedure Laterality Date    GASTRECTOMY SLEEVE LAPAROSCOPIC      [place] ARTHRODESIS GREAT TOE METATARSOPHALANGEAL JOINT Right 7/22/2022    Procedure: ARTHRODESIS / FUSION FOOT;  Surgeon: Yan Hayden DPM;  Location: [facility];  Service: Podiatry    UTERINE FIBROID SURGERY         Family History   Problem Relation Age of Onset    Hypertension Mother     Diabetes Mother     No Known Problems Father     No  Known Problems Maternal Grandmother     No Known Problems Maternal Grandfather     No Known Problems Paternal Grandmother     No Known Problems Paternal Grandfather     No Known Problems Maternal Aunt     No Known Problems Maternal Aunt     No Known Problems Paternal Aunt     No Known Problems Paternal Aunt     No Known Problems Paternal Uncle     No Known Problems Paternal Uncle     No Known Problems Half-Sister     No Known Problems Half-Sister        Social History     Occupational History    Not on file   Tobacco Use    Smoking status: Never    Smokeless tobacco: Never   Vaping Use    Vaping status: Never Used   Substance and Sexual Activity    Alcohol use: No    Drug use: No    Sexual activity: Not on file         Current Outpatient Medications:     ergocalciferol (VITAMIN D2) 50,000 units, Take 1 capsule (50,000 Units total) by mouth once a week, Disp: 12 capsule, Rfl: 1    valsartan-hydrochlorothiazide (DIOVAN-HCT) 80-12.5 MG per tablet, TAKE 1 TABLET DAILY, Disp: 90 tablet, Rfl: 0    Diclofenac Sodium (VOLTAREN) 1 %, Apply 2 g topically 2 (two) times a day for 15 days, Disp: 60 g, Rfl: 0    fluticasone (FLONASE) 50 mcg/act nasal spray, 2 sprays into each nostril daily, Disp: 18.2 mL, Rfl: 2    loratadine (CLARITIN) 10 mg tablet, Take 10 mg by mouth daily, Disp: , Rfl:     Nutritional Supplements (VITAMIN D BOOSTER PO), Take by mouth (Patient not taking: Reported on 7/22/2024), Disp: , Rfl:     Current Facility-Administered Medications:     cyanocobalamin injection 1,000 mcg, 1,000 mcg, Intramuscular, Q30 Days,     Allergies   Allergen Reactions    Aspirin Diarrhea and Vomiting            Vitals:    07/26/24 1043   BP: 148/84   Pulse: 67       Objective:  Physical exam  General: Awake, Alert, Oriented  Eyes: Pupils equal, round and reactive to light  Heart: regular rate and rhythm  Lungs: No audible wheezing  Abdomen: soft                    Right Hip Exam     Tenderness   The patient is experiencing  "tenderness in the greater trochanter.    Range of Motion   Abduction:  normal   Adduction:  normal   Extension:  normal   Flexion:  normal   External rotation:  normal   Internal rotation:  normal Right hip internal rotation: mild limitation.    Other   Erythema: absent  Scars: absent  Sensation: normal            Diagnostics, reviewed and taken today if performed as documented:    Right hip x-ray: (6/4/2024)   - Moderate osteoarthritis of ball and socket joint   - Subchondral sclerosis noted  - No acute fractures or dislocations noted          Procedures, if performed today:    Large joint arthrocentesis: R greater trochanteric bursa  Universal Protocol:  Consent: Verbal consent obtained.  Risks and benefits: risks, benefits and alternatives were discussed  Consent given by: patient  Site marked: the operative site was marked  Supporting Documentation  Indications: pain   Procedure Details  Location: hip - R greater trochanteric bursa  Needle size: 22 G  Medications administered: 2 mL bupivacaine 0.25 %; 2 mL lidocaine 1 %; 12 mg betamethasone acetate-betamethasone sodium phosphate 6 (3-3) mg/mL    Patient tolerance: patient tolerated the procedure well with no immediate complications  Dressing:  Sterile dressing applied          Portions of the record may have been created with voice recognition software.  Occasional wrong word or \"sound a like\" substitutions may have occurred due to the inherent limitations of voice recognition software.  Read the chart carefully and recognize, using context, where substitutions have occurred.      "

## 2024-08-12 ENCOUNTER — APPOINTMENT (OUTPATIENT)
Dept: LAB | Facility: CLINIC | Age: 69
End: 2024-08-12
Payer: COMMERCIAL

## 2024-08-12 DIAGNOSIS — Z02.1 PRE-EMPLOYMENT HEALTH SCREENING EXAMINATION: ICD-10-CM

## 2024-08-12 PROCEDURE — 36415 COLL VENOUS BLD VENIPUNCTURE: CPT

## 2024-08-12 PROCEDURE — 86480 TB TEST CELL IMMUN MEASURE: CPT

## 2024-08-13 LAB
GAMMA INTERFERON BACKGROUND BLD IA-ACNC: 0.1 IU/ML
M TB IFN-G BLD-IMP: POSITIVE
M TB IFN-G CD4+ BCKGRND COR BLD-ACNC: 0.35 IU/ML
M TB IFN-G CD4+ BCKGRND COR BLD-ACNC: 0.46 IU/ML
MITOGEN IGNF BCKGRD COR BLD-ACNC: 9.9 IU/ML

## 2024-08-27 ENCOUNTER — TELEPHONE (OUTPATIENT)
Dept: FAMILY MEDICINE CLINIC | Facility: CLINIC | Age: 69
End: 2024-08-27

## 2024-08-27 DIAGNOSIS — R76.12 POSITIVE QUANTIFERON-TB GOLD TEST: Primary | ICD-10-CM

## 2024-08-27 NOTE — TELEPHONE ENCOUNTER
Patient had a positive Q gold for pre employment phys Needs a chest xray ordered.  She will always be positive, she is from [place].  Has an appointment with you on Thursday will get it done then

## 2024-08-29 ENCOUNTER — APPOINTMENT (OUTPATIENT)
Dept: RADIOLOGY | Facility: CLINIC | Age: 69
End: 2024-08-29
Payer: COMMERCIAL

## 2024-08-29 ENCOUNTER — OFFICE VISIT (OUTPATIENT)
Dept: FAMILY MEDICINE CLINIC | Facility: CLINIC | Age: 69
End: 2024-08-29
Payer: COMMERCIAL

## 2024-08-29 ENCOUNTER — TELEPHONE (OUTPATIENT)
Dept: FAMILY MEDICINE CLINIC | Facility: CLINIC | Age: 69
End: 2024-08-29

## 2024-08-29 VITALS
HEART RATE: 63 BPM | BODY MASS INDEX: 36.47 KG/M2 | TEMPERATURE: 97.8 F | OXYGEN SATURATION: 95 % | SYSTOLIC BLOOD PRESSURE: 140 MMHG | WEIGHT: 198.2 LBS | HEIGHT: 62 IN | DIASTOLIC BLOOD PRESSURE: 82 MMHG

## 2024-08-29 DIAGNOSIS — M79.89 FOOT SWELLING: ICD-10-CM

## 2024-08-29 DIAGNOSIS — Z13.820 ENCOUNTER FOR OSTEOPOROSIS SCREENING IN ASYMPTOMATIC POSTMENOPAUSAL PATIENT: ICD-10-CM

## 2024-08-29 DIAGNOSIS — E53.8 VITAMIN B 12 DEFICIENCY: ICD-10-CM

## 2024-08-29 DIAGNOSIS — E66.01 OBESITY, MORBID (HCC): ICD-10-CM

## 2024-08-29 DIAGNOSIS — I10 ESSENTIAL HYPERTENSION: ICD-10-CM

## 2024-08-29 DIAGNOSIS — Z00.00 ANNUAL PHYSICAL EXAM: Primary | ICD-10-CM

## 2024-08-29 DIAGNOSIS — Z78.0 ENCOUNTER FOR OSTEOPOROSIS SCREENING IN ASYMPTOMATIC POSTMENOPAUSAL PATIENT: ICD-10-CM

## 2024-08-29 DIAGNOSIS — R76.12 POSITIVE QUANTIFERON-TB GOLD TEST: ICD-10-CM

## 2024-08-29 PROCEDURE — 99397 PER PM REEVAL EST PAT 65+ YR: CPT | Performed by: NURSE PRACTITIONER

## 2024-08-29 PROCEDURE — 71046 X-RAY EXAM CHEST 2 VIEWS: CPT

## 2024-08-29 RX ORDER — CYANOCOBALAMIN (VITAMIN B-12) 250 MCG
250 TABLET ORAL DAILY
Qty: 90 TABLET | Refills: 1 | Status: SHIPPED | OUTPATIENT
Start: 2024-08-29 | End: 2025-02-25

## 2024-08-29 RX ORDER — VALSARTAN AND HYDROCHLOROTHIAZIDE 80; 12.5 MG/1; MG/1
1 TABLET, FILM COATED ORAL DAILY
Qty: 90 TABLET | Refills: 3 | Status: SHIPPED | OUTPATIENT
Start: 2024-08-29

## 2024-08-29 RX ORDER — FUROSEMIDE 20 MG
20 TABLET ORAL DAILY
Qty: 10 TABLET | Refills: 0 | Status: SHIPPED | OUTPATIENT
Start: 2024-08-29 | End: 2024-09-08

## 2024-08-29 NOTE — PROGRESS NOTES
Adult Annual Physical  Name: Yasmeen Castle      : 1955      MRN: [id number]  Encounter Provider: ROVERTO Alexander  Encounter Date: 2024   Encounter department: [facility]    Assessment & Plan   1. Annual physical exam  2. Essential hypertension  -     valsartan-hydrochlorothiazide (DIOVAN-HCT) 80-12.5 MG per tablet; Take 1 tablet by mouth daily  3. Obesity, morbid (HCC)  4. Vitamin B 12 deficiency  -     vitamin B-12 (CYANOCOBALAMIN) 250 MCG TABS; Take 1 tablet (250 mcg total) by mouth daily  5. Encounter for osteoporosis screening in asymptomatic postmenopausal patient  -     DXA bone density spine hip and pelvis; Future; Expected date: 2024  6. Foot swelling  -     furosemide (LASIX) 20 mg tablet; Take 1 tablet (20 mg total) by mouth daily for 10 days    Immunizations and preventive care screenings were discussed with patient today. Appropriate education was printed on patient's after visit summary.    Counseling:  Dental Health: discussed importance of regular tooth brushing, flossing, and dental visits.  Injury prevention: discussed safety/seat belts, safety helmets, smoke detectors, carbon dioxide detectors, and smoking near bedding or upholstery.         History of Present Illness     Adult Annual Physical:  Patient presents for annual physical. Patient here for a work physical. Patient is planning to work at new facility and needs to have a physical for her employment .     Diet and Physical Activity:  - Diet/Nutrition: well balanced diet.  - Exercise: walking and 5-7 times a week on average.    Depression Screening:  - PHQ-2 Score: 0    General Health:  - Sleep: sleeps well and 7-8 hours of sleep on average.  - Hearing: normal hearing bilateral ears.  - Vision: goes for regular eye exams, wears glasses and most recent eye exam < 1 year ago.  - Dental: regular dental visits and brushes teeth twice daily.    /GYN Health:  - Follows with GYN: yes.   -  Menopause: postmenopausal.   - History of STDs: no    Review of Systems   Constitutional:  Negative for activity change, appetite change, chills, diaphoresis, fatigue, fever and unexpected weight change.   HENT:  Negative for congestion, ear pain, hearing loss, postnasal drip, sinus pressure, sinus pain, sneezing and sore throat.    Eyes:  Negative for pain, redness and visual disturbance.   Respiratory:  Negative for cough and shortness of breath.    Cardiovascular:  Negative for chest pain and leg swelling.   Gastrointestinal:  Negative for abdominal pain, diarrhea, nausea and vomiting.   Musculoskeletal:  Negative for arthralgias.   Neurological:  Negative for dizziness and light-headedness.   Psychiatric/Behavioral:  Negative for behavioral problems and dysphoric mood.      Pertinent Medical History         Medical History Reviewed by provider this encounter:  Meds  Problems       Past Medical History   Past Medical History:   Diagnosis Date   • Bunion, right 5/6/2022   • Chronic GERD 2/7/2018   • Hypertension    • Trochanteric bursitis 10/21/2019     Past Surgical History:   Procedure Laterality Date   • GASTRECTOMY SLEEVE LAPAROSCOPIC     • [facility] ARTHRODESIS GREAT TOE METATARSOPHALANGEAL JOINT Right 7/22/2022    Procedure: ARTHRODESIS / FUSION FOOT;  Surgeon: Yan Hayden DPM;  Location: [facility] OR;  Service: Podiatry   • UTERINE FIBROID SURGERY       Family History   Problem Relation Age of Onset   • Hypertension Mother    • Diabetes Mother    • No Known Problems Father    • No Known Problems Maternal Grandmother    • No Known Problems Maternal Grandfather    • No Known Problems Paternal Grandmother    • No Known Problems Paternal Grandfather    • No Known Problems Maternal Aunt    • No Known Problems Maternal Aunt    • No Known Problems Paternal Aunt    • No Known Problems Paternal Aunt    • No Known Problems Paternal Uncle    • No Known Problems Paternal Uncle    • No Known Problems Half-Sister     • No Known Problems Half-Sister      Current Outpatient Medications on File Prior to Visit   Medication Sig Dispense Refill   • ergocalciferol (VITAMIN D2) 50,000 units Take 1 capsule (50,000 Units total) by mouth once a week 12 capsule 1   • fluticasone (FLONASE) 50 mcg/act nasal spray 2 sprays into each nostril daily 18.2 mL 2   • loratadine (CLARITIN) 10 mg tablet Take 10 mg by mouth daily     • [DISCONTINUED] valsartan-hydrochlorothiazide (DIOVAN-HCT) 80-12.5 MG per tablet TAKE 1 TABLET DAILY 90 tablet 0   • Diclofenac Sodium (VOLTAREN) 1 % Apply 2 g topically 2 (two) times a day for 15 days 60 g 0   • [DISCONTINUED] Nutritional Supplements (VITAMIN D BOOSTER PO) Take by mouth (Patient not taking: Reported on 7/22/2024)       Current Facility-Administered Medications on File Prior to Visit   Medication Dose Route Frequency Provider Last Rate Last Admin   • [DISCONTINUED] cyanocobalamin injection 1,000 mcg  1,000 mcg Intramuscular Q30 Days          Allergies   Allergen Reactions   • Aspirin Diarrhea and Vomiting      Current Outpatient Medications on File Prior to Visit   Medication Sig Dispense Refill   • ergocalciferol (VITAMIN D2) 50,000 units Take 1 capsule (50,000 Units total) by mouth once a week 12 capsule 1   • fluticasone (FLONASE) 50 mcg/act nasal spray 2 sprays into each nostril daily 18.2 mL 2   • loratadine (CLARITIN) 10 mg tablet Take 10 mg by mouth daily     • [DISCONTINUED] valsartan-hydrochlorothiazide (DIOVAN-HCT) 80-12.5 MG per tablet TAKE 1 TABLET DAILY 90 tablet 0   • Diclofenac Sodium (VOLTAREN) 1 % Apply 2 g topically 2 (two) times a day for 15 days 60 g 0   • [DISCONTINUED] Nutritional Supplements (VITAMIN D BOOSTER PO) Take by mouth (Patient not taking: Reported on 7/22/2024)       Current Facility-Administered Medications on File Prior to Visit   Medication Dose Route Frequency Provider Last Rate Last Admin   • [DISCONTINUED] cyanocobalamin injection 1,000 mcg  1,000 mcg Intramuscular  "Q30 Days           Social History     Tobacco Use   • Smoking status: Never   • Smokeless tobacco: Never   Vaping Use   • Vaping status: Never Used   Substance and Sexual Activity   • Alcohol use: No   • Drug use: No   • Sexual activity: Not on file       Objective     /82 (BP Location: Right arm, Patient Position: Sitting)   Pulse 63   Temp 97.8 °F (36.6 °C) (Temporal)   Ht 5' 2\" (1.575 m)   Wt 89.9 kg (198 lb 3.2 oz)   SpO2 95%   BMI 36.25 kg/m²     Physical Exam  Vitals and nursing note reviewed.   Constitutional:       General: She is not in acute distress.     Appearance: She is well-developed.   HENT:      Head: Normocephalic and atraumatic.      Right Ear: Tympanic membrane normal.      Left Ear: Tympanic membrane normal.      Nose: Nose normal.   Eyes:      Conjunctiva/sclera: Conjunctivae normal.   Cardiovascular:      Rate and Rhythm: Normal rate and regular rhythm.      Heart sounds: No murmur heard.     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 No respiratory distress.      Breath sounds: Normal breath sounds.   Abdominal:      Palpations: Abdomen is soft.      Tenderness: There is no abdominal tenderness.   Musculoskeletal:         General: No swelling.      Cervical back: Neck supple.      Right lower leg: Edema present.      Left lower leg: Edema present.   Skin:     General: Skin is warm and dry.      Capillary Refill: Capillary refill takes less than 2 seconds.   Neurological:      Mental Status: She is alert.   Psychiatric:         Mood and Affect: Mood normal.       "

## 2024-08-29 NOTE — PATIENT INSTRUCTIONS
"Patient Education     Routine physical for adults   The Basics   Written by the doctors and editors at [facility]   What is a physical? -- A physical is a routine visit, or \"check-up,\" with your doctor. You might also hear it called a \"wellness visit\" or \"preventive visit.\"  During each visit, the doctor will:   Ask about your physical and mental health   Ask about your habits, behaviors, and lifestyle   Do an exam   Give you vaccines if needed   Talk to you about any medicines you take   Give advice about your health   Answer your questions  Getting regular check-ups is an important part of taking care of your health. It can help your doctor find and treat any problems you have. But it's also important for preventing health problems.  A routine physical is different from a \"sick visit.\" A sick visit is when you see a doctor because of a health concern or problem. Since physicals are scheduled ahead of time, you can think about what you want to ask the doctor.  How often should I get a physical? -- It depends on your age and health. In general, for people age 21 years and older:   If you are younger than 50 years, you might be able to get a physical every 3 years.   If you are 50 years or older, your doctor might recommend a physical every year.  If you have an ongoing health condition, like diabetes or high blood pressure, your doctor will probably want to see you more often.  What happens during a physical? -- In general, each visit will include:   Physical exam - The doctor or nurse will check your height, weight, heart rate, and blood pressure. They will also look at your eyes and ears. They will ask about how you are feeling and whether you have any symptoms that bother you.   Medicines - It's a good idea to bring a list of all the medicines you take to each doctor visit. Your doctor will talk to you about your medicines and answer any questions. Tell them if you are having any side effects that bother you. You " "should also tell them if you are having trouble paying for any of your medicines.   Habits and behaviors - This includes:   Your diet   Your exercise habits   Whether you smoke, drink alcohol, or use drugs   Whether you are sexually active   Whether you feel safe at home  Your doctor will talk to you about things you can do to improve your health and lower your risk of health problems. They will also offer help and support. For example, if you want to quit smoking, they can give you advice and might prescribe medicines. If you want to improve your diet or get more physical activity, they can help you with this, too.   Lab tests, if needed - The tests you get will depend on your age and situation. For example, your doctor might want to check your:   Cholesterol   Blood sugar   Iron level   Vaccines - The recommended vaccines will depend on your age, health, and what vaccines you already had. Vaccines are very important because they can prevent certain serious or deadly infections.   Discussion of screening - \"Screening\" means checking for diseases or other health problems before they cause symptoms. Your doctor can recommend screening based on your age, risk, and preferences. This might include tests to check for:   Cancer, such as breast, prostate, cervical, ovarian, colorectal, prostate, lung, or skin cancer   Sexually transmitted infections, such as chlamydia and gonorrhea   Mental health conditions like depression and anxiety  Your doctor will talk to you about the different types of screening tests. They can help you decide which screenings to have. They can also explain what the results might mean.   Answering questions - The physical is a good time to ask the doctor or nurse questions about your health. If needed, they can refer you to other doctors or specialists, too.  Adults older than 65 years often need other care, too. As you get older, your doctor will talk to you about:   How to prevent falling at " home   Hearing or vision tests   Memory testing   How to take your medicines safely   Making sure that you have the help and support you need at home  All topics are updated as new evidence becomes available and our peer review process is complete.  This topic retrieved from [employer] on: May 02, 2024.  Topic 454726 Version 1.0  Release: 32.4.3 - C32.122  © 2024 UpToDate, Inc. and/or its affiliates. All rights reserved.  Consumer Information Use and Disclaimer   Disclaimer: This generalized information is a limited summary of diagnosis, treatment, and/or medication information. It is not meant to be comprehensive and should be used as a tool to help the user understand and/or assess potential diagnostic and treatment options. It does NOT include all information about conditions, treatments, medications, side effects, or risks that may apply to a specific patient. It is not intended to be medical advice or a substitute for the medical advice, diagnosis, or treatment of a health care provider based on the health care provider's examination and assessment of a patient's specific and unique circumstances. Patients must speak with a health care provider for complete information about their health, medical questions, and treatment options, including any risks or benefits regarding use of medications. This information does not endorse any treatments or medications as safe, effective, or approved for treating a specific patient. UpToDate, Inc. and its affiliates disclaim any warranty or liability relating to this information or the use thereof.The use of this information is governed by the Terms of Use, available at https://www.woltersLaunchRockuwer.com/en/know/clinical-effectiveness-terms. 2024© UpToDate, Inc. and its affiliates and/or licensors. All rights reserved.  Copyright   © 2024 UpToDate, Inc. and/or its affiliates. All rights reserved.

## 2024-09-20 ENCOUNTER — OFFICE VISIT (OUTPATIENT)
Dept: URGENT CARE | Facility: CLINIC | Age: 69
End: 2024-09-20
Payer: COMMERCIAL

## 2024-09-20 VITALS
SYSTOLIC BLOOD PRESSURE: 150 MMHG | DIASTOLIC BLOOD PRESSURE: 80 MMHG | RESPIRATION RATE: 16 BRPM | HEART RATE: 72 BPM | TEMPERATURE: 98.5 F | OXYGEN SATURATION: 98 %

## 2024-09-20 DIAGNOSIS — H65.92 LEFT OTITIS MEDIA WITH EFFUSION: ICD-10-CM

## 2024-09-20 DIAGNOSIS — J02.0 STREP PHARYNGITIS: Primary | ICD-10-CM

## 2024-09-20 LAB — S PYO AG THROAT QL: POSITIVE

## 2024-09-20 PROCEDURE — 87880 STREP A ASSAY W/OPTIC: CPT

## 2024-09-20 PROCEDURE — 99213 OFFICE O/P EST LOW 20 MIN: CPT

## 2024-09-20 RX ORDER — PENICILLIN V POTASSIUM 500 MG/1
500 TABLET, FILM COATED ORAL EVERY 12 HOURS SCHEDULED
Qty: 20 TABLET | Refills: 0 | Status: SHIPPED | OUTPATIENT
Start: 2024-09-20 | End: 2024-09-30

## 2024-09-20 RX ORDER — METHOCARBAMOL 500 MG/1
500 TABLET, FILM COATED ORAL 4 TIMES DAILY
Qty: 20 TABLET | Refills: 0 | Status: SHIPPED | OUTPATIENT
Start: 2024-09-20 | End: 2024-09-20

## 2024-09-20 RX ORDER — IBUPROFEN 200 MG
TABLET ORAL EVERY 6 HOURS PRN
COMMUNITY

## 2024-09-20 NOTE — LETTER
September 20, 2024     Patient: Yasmeen Castle   YOB: 1955   Date of Visit: 9/20/2024       To Whom it May Concern:    Yasmeen Castle was seen in my clinic on 9/20/2024. She should be excused 9/20/24.    If you have any questions or concerns, please don't hesitate to call.         Sincerely,          ROVERTO Benavides        CC: No Recipients

## 2024-09-20 NOTE — PROGRESS NOTES
[facility]    NAME: Yasmeen Castle is a 69 y.o. female  : 1955    MRN: [id number]  DATE: 2024  TIME: 10:50 AM    Assessment and Plan   Strep pharyngitis [J02.0]  1. Strep pharyngitis  POCT rapid ANTIGEN strepA    penicillin V potassium (VEETID) 500 mg tablet      2. Left otitis media with effusion          Strep test was positive, start on antibiotics.   Follow up with primary care in 3-5 days.  Go to ER if symptoms get worse.     Patient Instructions     Take all of the antibiotics.   Go see your regular family doctor in 3-5 days.  Go to emergency room (ER) if you are getting worse.     Chief Complaint     Chief Complaint   Patient presents with    Earache     Patient with left ear pain and mouth pain x 4 days.reports left ear popping 4 days ago and has had sharp pain since then.          History of Present Illness       Presents with left ear pain and mouth pain that started 4 days ago. Noted ear popping 4 days ago. Follows up with ENT for chronic tinnitus. Reports high pain with opening mouth open. Pain to neck area.         Review of Systems   Review of Systems   Constitutional:  Negative for chills and fever.   HENT:  Positive for ear pain, tinnitus and trouble swallowing. Negative for congestion, mouth sores and sore throat.    Respiratory:  Negative for cough and shortness of breath.    Cardiovascular:  Negative for chest pain.   Gastrointestinal:  Negative for abdominal pain.   Musculoskeletal:  Negative for myalgias.   Psychiatric/Behavioral:  Negative for confusion.          Current Medications       Current Outpatient Medications:     Diclofenac Sodium (VOLTAREN) 1 %, Apply 2 g topically 2 (two) times a day for 15 days, Disp: 60 g, Rfl: 0    ergocalciferol (VITAMIN D2) 50,000 units, Take 1 capsule (50,000 Units total) by mouth once a week, Disp: 12 capsule, Rfl: 1    fluticasone (FLONASE) 50 mcg/act nasal spray, 2 sprays into each nostril daily, Disp: 18.2 mL, Rfl: 2     ibuprofen (MOTRIN) 200 mg tablet, Take by mouth every 6 (six) hours as needed for mild pain, Disp: , Rfl:     penicillin V potassium (VEETID) 500 mg tablet, Take 1 tablet (500 mg total) by mouth every 12 (twelve) hours for 10 days, Disp: 20 tablet, Rfl: 0    valsartan-hydrochlorothiazide (DIOVAN-HCT) 80-12.5 MG per tablet, Take 1 tablet by mouth daily, Disp: 90 tablet, Rfl: 3    vitamin B-12 (CYANOCOBALAMIN) 250 MCG TABS, Take 1 tablet (250 mcg total) by mouth daily, Disp: 90 tablet, Rfl: 1    furosemide (LASIX) 20 mg tablet, Take 1 tablet (20 mg total) by mouth daily for 10 days (Patient not taking: Reported on 9/20/2024), Disp: 10 tablet, Rfl: 0    loratadine (CLARITIN) 10 mg tablet, Take 10 mg by mouth daily (Patient not taking: Reported on 9/20/2024), Disp: , Rfl:     Current Allergies     Allergies as of 09/20/2024 - Reviewed 09/20/2024   Allergen Reaction Noted    Aspirin Diarrhea and Vomiting 11/23/2016            The following portions of the patient's history were reviewed and updated as appropriate: allergies, current medications, past family history, past medical history, past social history, past surgical history and problem list.     Past Medical History:   Diagnosis Date    Bunion, right 5/6/2022    Chronic GERD 2/7/2018    Hypertension     Trochanteric bursitis 10/21/2019       Past Surgical History:   Procedure Laterality Date    GASTRECTOMY SLEEVE LAPAROSCOPIC      [place] ARTHRODESIS GREAT TOE METATARSOPHALANGEAL JOINT Right 7/22/2022    Procedure: ARTHRODESIS / FUSION FOOT;  Surgeon: Yan Hayden DPM;  Location: [facility];  Service: Podiatry    UTERINE FIBROID SURGERY         Family History   Problem Relation Age of Onset    Hypertension Mother     Diabetes Mother     No Known Problems Father     No Known Problems Maternal Grandmother     No Known Problems Maternal Grandfather     No Known Problems Paternal Grandmother     No Known Problems Paternal Grandfather     No Known Problems  Maternal Aunt     No Known Problems Maternal Aunt     No Known Problems Paternal Aunt     No Known Problems Paternal Aunt     No Known Problems Paternal Uncle     No Known Problems Paternal Uncle     No Known Problems Half-Sister     No Known Problems Half-Sister          Medications have been verified.        Objective   /80   Pulse 72   Temp 98.5 °F (36.9 °C)   Resp 16   SpO2 98%        Physical Exam     Physical Exam  Vitals reviewed.   Constitutional:       General: She is not in acute distress.     Appearance: Normal appearance.   HENT:      Right Ear: Tympanic membrane, ear canal and external ear normal. No tenderness. No middle ear effusion. There is no impacted cerumen. Tympanic membrane is not erythematous or bulging.      Left Ear: Ear canal and external ear normal. No tenderness. A middle ear effusion is present. There is no impacted cerumen. Tympanic membrane is not erythematous or bulging.      Nose: Nose normal.      Mouth/Throat:      Mouth: Mucous membranes are moist.      Dentition: No dental tenderness, dental caries, dental abscesses or gum lesions.      Tongue: No lesions.      Palate: No mass.      Pharynx: Oropharynx is clear. Uvula midline. No pharyngeal swelling, oropharyngeal exudate, posterior oropharyngeal erythema or uvula swelling.      Tonsils: No tonsillar exudate or tonsillar abscesses. 0 on the right. 0 on the left.      Comments: No salivary gland swelling or stones noted.   Eyes:      Conjunctiva/sclera: Conjunctivae normal.   Cardiovascular:      Rate and Rhythm: Normal rate and regular rhythm.      Pulses: Normal pulses.      Heart sounds: Normal heart sounds. No murmur heard.  Pulmonary:      Effort: Pulmonary effort is normal. No respiratory distress.      Breath sounds: Normal breath sounds.   Lymphadenopathy:      Cervical: Cervical adenopathy (very tender, lymph node not felt) present.   Skin:     General: Skin is warm and dry.   Neurological:      General: No  focal deficit present.      Mental Status: She is alert and oriented to person, place, and time.   Psychiatric:         Mood and Affect: Mood normal.         Behavior: Behavior normal.

## 2024-09-20 NOTE — PATIENT INSTRUCTIONS
Take all of the antibiotics.   Go see your regular family doctor in 3-5 days.  Go to emergency room (ER) if you are getting worse

## 2024-10-02 ENCOUNTER — OFFICE VISIT (OUTPATIENT)
Dept: URGENT CARE | Facility: CLINIC | Age: 69
End: 2024-10-02
Payer: COMMERCIAL

## 2024-10-02 VITALS
SYSTOLIC BLOOD PRESSURE: 134 MMHG | RESPIRATION RATE: 18 BRPM | DIASTOLIC BLOOD PRESSURE: 70 MMHG | OXYGEN SATURATION: 98 % | HEART RATE: 82 BPM | TEMPERATURE: 98.3 F

## 2024-10-02 DIAGNOSIS — J06.9 VIRAL URI WITH COUGH: Primary | ICD-10-CM

## 2024-10-02 DIAGNOSIS — R05.1 ACUTE COUGH: ICD-10-CM

## 2024-10-02 DIAGNOSIS — J02.9 SORE THROAT: ICD-10-CM

## 2024-10-02 LAB — S PYO AG THROAT QL: NEGATIVE

## 2024-10-02 PROCEDURE — 87880 STREP A ASSAY W/OPTIC: CPT

## 2024-10-02 PROCEDURE — 99213 OFFICE O/P EST LOW 20 MIN: CPT

## 2024-10-02 RX ORDER — ALBUTEROL SULFATE 90 UG/1
2 INHALANT RESPIRATORY (INHALATION) EVERY 6 HOURS PRN
Qty: 8.5 G | Refills: 0 | Status: SHIPPED | OUTPATIENT
Start: 2024-10-02

## 2024-10-02 RX ORDER — ACETAMINOPHEN 500 MG
500 TABLET ORAL EVERY 6 HOURS PRN
COMMUNITY

## 2024-10-02 RX ORDER — BENZONATATE 200 MG/1
200 CAPSULE ORAL 3 TIMES DAILY PRN
Qty: 20 CAPSULE | Refills: 0 | Status: SHIPPED | OUTPATIENT
Start: 2024-10-02

## 2024-10-02 NOTE — PROGRESS NOTES
[facility]        NAME: Yasmeen Castle is a 69 y.o. female  : 1955    MRN: [id number]  DATE: 2024  TIME: 8:50 AM    Assessment and Plan   Viral URI with cough [J06.9]  1. Viral URI with cough        2. Sore throat  POCT rapid ANTIGEN strepA      3. Acute cough  albuterol (ProAir HFA) 90 mcg/act inhaler    benzonatate (TESSALON) 200 MG capsule        Rapid strep negative.     Patient Instructions     Albuterol inhaler as needed.  Benzonatate or delsym as needed for cough.    Follow up with PCP in 3-5 days.  Proceed to the ER with worsening symptoms.    Chief Complaint     Chief Complaint   Patient presents with    asthma flare up     Pt was seen here 2 weeks ago for sore throat, was diagnosed with strep, still has two pills left of penicillin. Still has sore throat. Last night was having asthma flare up, felt chest tightness and SOB. Does not have albuterol inhaler.          History of Present Illness       The patient presents today with complaints of sore throat to the L side x 1.5 weeks. She was seen here on 24 and tested positive for strep. She was placed on penicillin VK. She has 2 doses left stating she forgot to take the pm dose a few times. Also since yesterday she started with a dry cough non productive, chest tightness, and slight wheezing. She reports history of asthma, but does not currently have an inhaler. Denies fever/chills, SOB. She has been taking halls as needed with some short term relief.         Review of Systems   Review of Systems   Constitutional:  Negative for chills and fever.   HENT:  Positive for sore throat. Negative for congestion, ear pain, rhinorrhea, sinus pain, trouble swallowing and voice change.    Respiratory:  Positive for cough, chest tightness and wheezing. Negative for shortness of breath.    Gastrointestinal:  Negative for abdominal pain, diarrhea, nausea and vomiting.   Musculoskeletal:  Negative for myalgias.   Skin:  Negative for rash.          Current Medications       Current Outpatient Medications:     acetaminophen (TYLENOL) 500 mg tablet, Take 500 mg by mouth every 6 (six) hours as needed for mild pain, Disp: , Rfl:     albuterol (ProAir HFA) 90 mcg/act inhaler, Inhale 2 puffs every 6 (six) hours as needed for wheezing, Disp: 8.5 g, Rfl: 0    benzonatate (TESSALON) 200 MG capsule, Take 1 capsule (200 mg total) by mouth 3 (three) times a day as needed for cough, Disp: 20 capsule, Rfl: 0    Diclofenac Sodium (VOLTAREN) 1 %, Apply 2 g topically 2 (two) times a day for 15 days, Disp: 60 g, Rfl: 0    ergocalciferol (VITAMIN D2) 50,000 units, Take 1 capsule (50,000 Units total) by mouth once a week, Disp: 12 capsule, Rfl: 1    fluticasone (FLONASE) 50 mcg/act nasal spray, 2 sprays into each nostril daily, Disp: 18.2 mL, Rfl: 2    valsartan-hydrochlorothiazide (DIOVAN-HCT) 80-12.5 MG per tablet, Take 1 tablet by mouth daily, Disp: 90 tablet, Rfl: 3    vitamin B-12 (CYANOCOBALAMIN) 250 MCG TABS, Take 1 tablet (250 mcg total) by mouth daily, Disp: 90 tablet, Rfl: 1    furosemide (LASIX) 20 mg tablet, Take 1 tablet (20 mg total) by mouth daily for 10 days (Patient not taking: Reported on 10/2/2024), Disp: 10 tablet, Rfl: 0    ibuprofen (MOTRIN) 200 mg tablet, Take by mouth every 6 (six) hours as needed for mild pain (Patient not taking: Reported on 10/2/2024), Disp: , Rfl:     loratadine (CLARITIN) 10 mg tablet, Take 10 mg by mouth daily (Patient not taking: Reported on 9/20/2024), Disp: , Rfl:     Current Allergies     Allergies as of 10/02/2024 - Reviewed 10/02/2024   Allergen Reaction Noted    Aspirin Diarrhea and Vomiting 11/23/2016            The following portions of the patient's history were reviewed and updated as appropriate: allergies, current medications, past family history, past medical history, past social history, past surgical history and problem list.     Past Medical History:   Diagnosis Date    Bunion, right 5/6/2022    Chronic GERD  2/7/2018    Hypertension     Trochanteric bursitis 10/21/2019       Past Surgical History:   Procedure Laterality Date    GASTRECTOMY SLEEVE LAPAROSCOPIC      VT ARTHRODESIS GREAT TOE METATARSOPHALANGEAL JOINT Right 7/22/2022    Procedure: ARTHRODESIS / FUSION FOOT;  Surgeon: Yan Hayden DPM;  Location: [facility] OR;  Service: Podiatry    UTERINE FIBROID SURGERY         Family History   Problem Relation Age of Onset    Hypertension Mother     Diabetes Mother     No Known Problems Father     No Known Problems Maternal Grandmother     No Known Problems Maternal Grandfather     No Known Problems Paternal Grandmother     No Known Problems Paternal Grandfather     No Known Problems Maternal Aunt     No Known Problems Maternal Aunt     No Known Problems Paternal Aunt     No Known Problems Paternal Aunt     No Known Problems Paternal Uncle     No Known Problems Paternal Uncle     No Known Problems Half-Sister     No Known Problems Half-Sister          Medications have been verified.        Objective   /70   Pulse 82   Temp 98.3 °F (36.8 °C)   Resp 18   SpO2 98%        Physical Exam     Physical Exam  Vitals and nursing note reviewed.   Constitutional:       General: She is not in acute distress.     Appearance: Normal appearance. She is not ill-appearing.   HENT:      Head: Normocephalic and atraumatic.      Right Ear: Tympanic membrane, ear canal and external ear normal.      Left Ear: Tympanic membrane, ear canal and external ear normal.      Nose: Nose normal. No congestion or rhinorrhea.      Mouth/Throat:      Lips: Pink.      Mouth: Mucous membranes are moist.      Pharynx: Uvula midline. No pharyngeal swelling, oropharyngeal exudate, posterior oropharyngeal erythema, uvula swelling or postnasal drip.      Tonsils: No tonsillar exudate.   Eyes:      General: Vision grossly intact.      Extraocular Movements: Extraocular movements intact.      Pupils: Pupils are equal, round, and reactive to  light.   Cardiovascular:      Rate and Rhythm: Normal rate and regular rhythm.      Heart sounds: Normal heart sounds. No murmur heard.  Pulmonary:      Effort: Pulmonary effort is normal. No respiratory distress.      Breath sounds: Normal breath sounds. No decreased air movement. No decreased breath sounds, wheezing, rhonchi or rales.      Comments: No cough noted during visit. Speaking in full sentenced without any acute distress.   Musculoskeletal:         General: Normal range of motion.      Cervical back: Normal range of motion.   Lymphadenopathy:      Cervical: No cervical adenopathy.   Skin:     General: Skin is warm.      Findings: No rash.   Neurological:      Mental Status: She is alert and oriented to person, place, and time.      Motor: Motor function is intact.      Gait: Gait is intact.   Psychiatric:         Attention and Perception: Attention normal.         Mood and Affect: Mood normal.

## 2024-10-02 NOTE — PATIENT INSTRUCTIONS
Albuterol inhaler as needed.  Benzonatate or delsym as needed for cough.    Follow up with PCP in 3-5 days.  Proceed to the ER with worsening symptoms.

## 2024-10-10 ENCOUNTER — TELEPHONE (OUTPATIENT)
Age: 69
End: 2024-10-10

## 2024-10-10 NOTE — TELEPHONE ENCOUNTER
Patient states her strep throat is getting better although she still has the cough and was wondering if Sherron could order her a stronger inhaler to the pharmacy, please advise

## 2024-10-11 NOTE — TELEPHONE ENCOUNTER
Patient called to cancel her appointment with Per perez, stated she was feeling better.  She requested an appointment for November with Sherron, scheduled for 11/11/24.

## 2024-10-31 ENCOUNTER — OFFICE VISIT (OUTPATIENT)
Dept: URGENT CARE | Facility: CLINIC | Age: 69
End: 2024-10-31
Payer: COMMERCIAL

## 2024-10-31 ENCOUNTER — DOCUMENTATION (OUTPATIENT)
Dept: ADMINISTRATIVE | Facility: OTHER | Age: 69
End: 2024-10-31

## 2024-10-31 VITALS
BODY MASS INDEX: 30.91 KG/M2 | TEMPERATURE: 98.2 F | HEART RATE: 78 BPM | RESPIRATION RATE: 18 BRPM | WEIGHT: 168 LBS | HEIGHT: 62 IN | OXYGEN SATURATION: 98 % | SYSTOLIC BLOOD PRESSURE: 156 MMHG | DIASTOLIC BLOOD PRESSURE: 88 MMHG

## 2024-10-31 VITALS — SYSTOLIC BLOOD PRESSURE: 129 MMHG | DIASTOLIC BLOOD PRESSURE: 83 MMHG

## 2024-10-31 DIAGNOSIS — R05.1 ACUTE COUGH: ICD-10-CM

## 2024-10-31 DIAGNOSIS — S05.02XA ABRASION OF LEFT CORNEA, INITIAL ENCOUNTER: Primary | ICD-10-CM

## 2024-10-31 PROCEDURE — S9083 URGENT CARE CENTER GLOBAL: HCPCS

## 2024-10-31 PROCEDURE — G0382 LEV 3 HOSP TYPE B ED VISIT: HCPCS

## 2024-10-31 RX ORDER — OFLOXACIN 3 MG/ML
1 SOLUTION/ DROPS OPHTHALMIC 4 TIMES DAILY
Qty: 5 ML | Refills: 0 | Status: SHIPPED | OUTPATIENT
Start: 2024-10-31

## 2024-10-31 RX ORDER — PREDNISONE 20 MG/1
40 TABLET ORAL DAILY
Qty: 10 TABLET | Refills: 0 | Status: SHIPPED | OUTPATIENT
Start: 2024-10-31 | End: 2024-11-05

## 2024-10-31 NOTE — PATIENT INSTRUCTIONS
Antibiotic eye drops as directed.  Prednisone as directed.    Follow up with eye MD if no improvement.  Follow up with PCP in 3-5 days.  Proceed to the ER with worsening symptoms.

## 2024-10-31 NOTE — PROGRESS NOTES
ROVERTO Gambino  P Patient Reported Team         Blood pressure elevated  Appointment department: [facility] [place]  Appointment provider: ROVERTO Gambino  Blood pressure  10/31/24 1049 156/88  10/31/24 1018 (!) 174/85  10/31/24 2:59 PM    Patient was called after the Urgent Care visit . Home BP reading(s) was/were 129/83. Patient has no symptoms. No apt needed as per patient. Her next apt is on 11-11-24    Thank you.  Dia Corea MA  [facility]

## 2024-10-31 NOTE — PROGRESS NOTES
[facility]        NAME: Yasmeen Castle is a 69 y.o. female  : 1955    MRN: [id number]  DATE: 2024  TIME: 10:54 AM    Assessment and Plan   Abrasion of left cornea, initial encounter [S05.02XA]  1. Abrasion of left cornea, initial encounter  ofloxacin (OCUFLOX) 0.3 % ophthalmic solution      2. Acute cough  predniSONE 20 mg tablet        After patient consent was obtained, 1 drop of tetracaine and fluorescein stain was administered into L eye. Direct visualization was achieved with UV light with abrasion noted at 6 o clock. L eyelid was everted without evidence of foreign body. L eye was irrigated with saline solution. Patient tolerated procedure without incident.   BP elevated in clinic. Did not take BP meds yet today. Patient going to take medications when she gets home.     Patient Instructions       Follow up with PCP in 3-5 days.  Proceed to  ER if symptoms worsen.    If tests are performed, our office will contact you with results only if changes need to made to the care plan discussed with you at the visit. [facility]    Chief Complaint     Chief Complaint   Patient presents with    Eye Pain     Left eye pain that started . Cough for 2 weeks.         History of Present Illness       The patient presents today with complaints of L eye wateriness, redness, and irritation that started on . She does not wear contacts. Was working outside in her garden and is unsure if something got in her eye. Also complains of an ongoing cough since earlier this month. Was seen earlier this month and given script for albuterol and benzonatate with minimal improvement. Denies wheezing, SOB, fever/chills, weakness. States the cough is dry with occasional production of white colored sputum.         Review of Systems   Review of Systems   Constitutional:  Negative for chills and fever.   HENT:  Negative for congestion.    Eyes:  Positive for  photophobia, pain, discharge and redness. Negative for itching and visual disturbance.        Left   Respiratory:  Positive for cough and chest tightness. Negative for shortness of breath and wheezing.    Gastrointestinal:  Negative for abdominal pain, diarrhea, nausea and vomiting.   Genitourinary:  Negative for difficulty urinating.   Musculoskeletal:  Negative for myalgias.   Skin:  Negative for rash.         Current Medications       Current Outpatient Medications:     acetaminophen (TYLENOL) 500 mg tablet, Take 500 mg by mouth every 6 (six) hours as needed for mild pain, Disp: , Rfl:     albuterol (ProAir HFA) 90 mcg/act inhaler, Inhale 2 puffs every 6 (six) hours as needed for wheezing, Disp: 8.5 g, Rfl: 0    ergocalciferol (VITAMIN D2) 50,000 units, Take 1 capsule (50,000 Units total) by mouth once a week, Disp: 12 capsule, Rfl: 1    ofloxacin (OCUFLOX) 0.3 % ophthalmic solution, Administer 1 drop into the left eye 4 (four) times a day, Disp: 5 mL, Rfl: 0    predniSONE 20 mg tablet, Take 2 tablets (40 mg total) by mouth daily for 5 days, Disp: 10 tablet, Rfl: 0    valsartan-hydrochlorothiazide (DIOVAN-HCT) 80-12.5 MG per tablet, Take 1 tablet by mouth daily, Disp: 90 tablet, Rfl: 3    vitamin B-12 (CYANOCOBALAMIN) 250 MCG TABS, Take 1 tablet (250 mcg total) by mouth daily, Disp: 90 tablet, Rfl: 1    benzonatate (TESSALON) 200 MG capsule, Take 1 capsule (200 mg total) by mouth 3 (three) times a day as needed for cough (Patient not taking: Reported on 10/31/2024), Disp: 20 capsule, Rfl: 0    Diclofenac Sodium (VOLTAREN) 1 %, Apply 2 g topically 2 (two) times a day for 15 days, Disp: 60 g, Rfl: 0    fluticasone (FLONASE) 50 mcg/act nasal spray, 2 sprays into each nostril daily, Disp: 18.2 mL, Rfl: 2    furosemide (LASIX) 20 mg tablet, Take 1 tablet (20 mg total) by mouth daily for 10 days (Patient not taking: Reported on 10/2/2024), Disp: 10 tablet, Rfl: 0    ibuprofen (MOTRIN) 200 mg tablet, Take by mouth every  "6 (six) hours as needed for mild pain (Patient not taking: Reported on 10/2/2024), Disp: , Rfl:     loratadine (CLARITIN) 10 mg tablet, Take 10 mg by mouth daily (Patient not taking: Reported on 9/20/2024), Disp: , Rfl:     Current Allergies     Allergies as of 10/31/2024 - Reviewed 10/31/2024   Allergen Reaction Noted    Aspirin Diarrhea and Vomiting 11/23/2016            The following portions of the patient's history were reviewed and updated as appropriate: allergies, current medications, past family history, past medical history, past social history, past surgical history and problem list.     Past Medical History:   Diagnosis Date    Bunion, right 5/6/2022    Chronic GERD 2/7/2018    Hypertension     Trochanteric bursitis 10/21/2019       Past Surgical History:   Procedure Laterality Date    GASTRECTOMY SLEEVE LAPAROSCOPIC      SC ARTHRODESIS GREAT TOE METATARSOPHALANGEAL JOINT Right 7/22/2022    Procedure: ARTHRODESIS / FUSION FOOT;  Surgeon: Yan Hayden DPM;  Location: [facility];  Service: Podiatry    UTERINE FIBROID SURGERY         Family History   Problem Relation Age of Onset    Hypertension Mother     Diabetes Mother     No Known Problems Father     No Known Problems Maternal Grandmother     No Known Problems Maternal Grandfather     No Known Problems Paternal Grandmother     No Known Problems Paternal Grandfather     No Known Problems Maternal Aunt     No Known Problems Maternal Aunt     No Known Problems Paternal Aunt     No Known Problems Paternal Aunt     No Known Problems Paternal Uncle     No Known Problems Paternal Uncle     No Known Problems Half-Sister     No Known Problems Half-Sister          Medications have been verified.        Objective   /88   Pulse 78   Temp 98.2 °F (36.8 °C) (Temporal)   Resp 18   Ht 5' 2\" (1.575 m)   Wt 76.2 kg (168 lb)   SpO2 98%   BMI 30.73 kg/m²        Physical Exam     Physical Exam  Vitals and nursing note reviewed.   Constitutional:  "      General: She is not in acute distress.     Appearance: Normal appearance. She is not ill-appearing.   HENT:      Head: Normocephalic and atraumatic.      Right Ear: Tympanic membrane, ear canal and external ear normal.      Left Ear: Tympanic membrane, ear canal and external ear normal.      Nose: Nose normal. No congestion or rhinorrhea.      Mouth/Throat:      Lips: Pink.      Mouth: Mucous membranes are moist.      Pharynx: No oropharyngeal exudate or posterior oropharyngeal erythema.      Tonsils: No tonsillar exudate.   Eyes:      General: Vision grossly intact.      Extraocular Movements: Extraocular movements intact.      Conjunctiva/sclera:      Left eye: Left conjunctiva is injected. Exudate (clear watery) present. No chemosis or hemorrhage.     Pupils: Pupils are equal, round, and reactive to light.      Left eye: Corneal abrasion (6 o clock) and fluorescein uptake present.   Cardiovascular:      Rate and Rhythm: Normal rate and regular rhythm.      Heart sounds: Normal heart sounds. No murmur heard.  Pulmonary:      Effort: Pulmonary effort is normal. No respiratory distress.      Breath sounds: Normal breath sounds. No decreased air movement. No decreased breath sounds, wheezing, rhonchi or rales.   Musculoskeletal:         General: Normal range of motion.      Cervical back: Normal range of motion.   Lymphadenopathy:      Cervical: No cervical adenopathy.   Skin:     General: Skin is warm and dry.      Findings: No rash.   Neurological:      Mental Status: She is alert and oriented to person, place, and time. Mental status is at baseline.      Motor: Motor function is intact.      Gait: Gait is intact.   Psychiatric:         Attention and Perception: Attention normal.         Mood and Affect: Mood normal.         Behavior: Behavior normal.

## 2024-11-11 ENCOUNTER — OFFICE VISIT (OUTPATIENT)
Dept: FAMILY MEDICINE CLINIC | Facility: CLINIC | Age: 69
End: 2024-11-11
Payer: COMMERCIAL

## 2024-11-11 VITALS
HEIGHT: 62 IN | TEMPERATURE: 98.6 F | OXYGEN SATURATION: 98 % | SYSTOLIC BLOOD PRESSURE: 110 MMHG | BODY MASS INDEX: 34.6 KG/M2 | HEART RATE: 89 BPM | WEIGHT: 188 LBS | DIASTOLIC BLOOD PRESSURE: 72 MMHG

## 2024-11-11 DIAGNOSIS — J33.9 RIGHT NASAL POLYPS: ICD-10-CM

## 2024-11-11 DIAGNOSIS — H93.12 NEW ONSET TINNITUS OF LEFT EAR: ICD-10-CM

## 2024-11-11 DIAGNOSIS — H93.19 ASYMMETRIC SUBJECTIVE NONPULSATILE TINNITUS WITHOUT HEARING LOSS, OTOSCOPIC FINDING, NEUROLOGIC DEFICIT, OR HEAD TRAUMA: Primary | ICD-10-CM

## 2024-11-11 DIAGNOSIS — R42 DIZZINESS: ICD-10-CM

## 2024-11-11 DIAGNOSIS — S05.02XA ABRASION OF LEFT CORNEA, INITIAL ENCOUNTER: ICD-10-CM

## 2024-11-11 PROCEDURE — 99213 OFFICE O/P EST LOW 20 MIN: CPT | Performed by: NURSE PRACTITIONER

## 2024-11-11 RX ORDER — OFLOXACIN 3 MG/ML
1 SOLUTION/ DROPS OPHTHALMIC 4 TIMES DAILY
Qty: 5 ML | Refills: 0 | Status: SHIPPED | OUTPATIENT
Start: 2024-11-11

## 2024-11-11 NOTE — PROGRESS NOTES
Ambulatory Visit  Name: Yasmeen Castle      : 1955      MRN: [id number]  Encounter Provider: ROVERTO Alexander  Encounter Date: 2024   Encounter department: [facility]    Assessment & Plan  Asymmetric subjective nonpulsatile tinnitus without hearing loss, otoscopic finding, neurologic deficit, or head trauma  Patient with ongoing symptoms for the past year and had seen two ENT to evaluate and had been ordered MRI with IAC has not completed will order test and f/u after    Orders:    MRI brain IAC wo and w contrast; Future    New onset tinnitus of left ear    Orders:    MRI brain IAC wo and w contrast; Future    Right nasal polyps  She has been taking the Flonase with limited benefit and has been compliant     Orders:    MRI brain IAC wo and w contrast; Future    Dizziness    Orders:    MRI brain IAC wo and w contrast; Future    Abrasion of left cornea, initial encounter    Orders:    ofloxacin (OCUFLOX) 0.3 % ophthalmic solution; Administer 1 drop into the left eye 4 (four) times a day  Patient with ongoing eye pain and irritation in the left eye will refill her eye drops      History of Present Illness     Patient here today and reports that she was in with complaints that she was having a cough and had strep throat and was treated with ABX and had to go to the walk in center twice and has resolved. She is feeling much better with the ABX. Patient also went to hearing specialist. She is having ongoing blocked in the left ear and sinus congestion Patient had seen ENT and was advised to have an MRI which she has not completed and she continues with tinnitus         History obtained from : patient  Review of Systems   Constitutional:  Negative for activity change, appetite change, chills, diaphoresis, fatigue, fever and unexpected weight change.   HENT:  Positive for congestion, postnasal drip, rhinorrhea, sinus pressure and tinnitus. Negative for ear pain, hearing loss,  "sinus pain, sneezing and sore throat.    Eyes:  Negative for pain, redness and visual disturbance.   Respiratory:  Negative for cough and shortness of breath.    Cardiovascular:  Negative for chest pain and leg swelling.   Gastrointestinal:  Negative for abdominal pain, diarrhea, nausea and vomiting.   Endocrine: Negative.    Genitourinary: Negative.    Musculoskeletal:  Negative for arthralgias.   Skin: Negative.    Allergic/Immunologic: Negative.    Neurological:  Negative for dizziness and light-headedness.   Hematological: Negative.    Psychiatric/Behavioral:  Negative for behavioral problems and dysphoric mood.            Objective     /72 (BP Location: Left arm, Patient Position: Sitting, Cuff Size: Large)   Pulse 89   Temp 98.6 °F (37 °C)   Ht 5' 2\" (1.575 m)   Wt 85.3 kg (188 lb)   SpO2 98%   BMI 34.39 kg/m²     Physical Exam  Vitals reviewed.   Constitutional:       General: She is not in acute distress.     Appearance: She is well-developed.   HENT:      Head: Normocephalic and atraumatic.      Right Ear: Hearing normal. A middle ear effusion is present.      Left Ear: Hearing normal.   Eyes:      General: Lids are normal.      Pupils: Pupils are equal, round, and reactive to light.   Neck:      Thyroid: No thyromegaly.   Cardiovascular:      Rate and Rhythm: Normal rate and regular rhythm.      Heart sounds: Normal heart sounds. No murmur heard.  Pulmonary:      Effort: Pulmonary effort is normal. No respiratory distress.      Breath sounds: Normal breath sounds. No wheezing.   Abdominal:      General: Bowel sounds are normal.      Palpations: Abdomen is soft.   Musculoskeletal:         General: Normal range of motion.      Cervical back: Normal range of motion.   Skin:     General: Skin is warm and dry.   Neurological:      Mental Status: She is alert and oriented to person, place, and time.   Psychiatric:         Mood and Affect: Mood normal.         "

## 2024-11-11 NOTE — ASSESSMENT & PLAN NOTE
She has been taking the Flonase with limited benefit and has been compliant     Orders:    MRI brain IAC wo and w contrast; Future

## 2024-11-11 NOTE — ASSESSMENT & PLAN NOTE
Patient with ongoing symptoms for the past year and had seen two ENT to evaluate and had been ordered MRI with IAC has not completed will order test and f/u after    Orders:    MRI brain IAC wo and w contrast; Future

## 2024-11-24 ENCOUNTER — APPOINTMENT (EMERGENCY)
Dept: CT IMAGING | Facility: HOSPITAL | Age: 69
End: 2024-11-24
Payer: COMMERCIAL

## 2024-11-24 ENCOUNTER — HOSPITAL ENCOUNTER (EMERGENCY)
Facility: HOSPITAL | Age: 69
Discharge: HOME | End: 2024-11-24
Attending: EMERGENCY MEDICINE
Payer: COMMERCIAL

## 2024-11-24 VITALS
HEART RATE: 73 BPM | WEIGHT: 183 LBS | OXYGEN SATURATION: 95 % | BODY MASS INDEX: 32.43 KG/M2 | SYSTOLIC BLOOD PRESSURE: 147 MMHG | TEMPERATURE: 98 F | DIASTOLIC BLOOD PRESSURE: 82 MMHG | HEIGHT: 63 IN | RESPIRATION RATE: 18 BRPM

## 2024-11-24 DIAGNOSIS — H81.399 PERIPHERAL VERTIGO: Primary | ICD-10-CM

## 2024-11-24 LAB
ANION GAP SERPL CALCULATED.3IONS-SCNC: 7 MMOL/L (ref 4–13)
ATRIAL RATE: 82 BPM
BASOPHILS # BLD AUTO: 0.04 THOUSANDS/ΜL (ref 0–0.1)
BASOPHILS NFR BLD AUTO: 1 % (ref 0–1)
BUN SERPL-MCNC: 11 MG/DL (ref 5–25)
CALCIUM SERPL-MCNC: 8.9 MG/DL (ref 8.4–10.2)
CHLORIDE SERPL-SCNC: 104 MMOL/L (ref 96–108)
CO2 SERPL-SCNC: 29 MMOL/L (ref 21–32)
CREAT SERPL-MCNC: 0.86 MG/DL (ref 0.6–1.3)
EOSINOPHIL # BLD AUTO: 0.1 THOUSAND/ΜL (ref 0–0.61)
EOSINOPHIL NFR BLD AUTO: 1 % (ref 0–6)
ERYTHROCYTE [DISTWIDTH] IN BLOOD BY AUTOMATED COUNT: 12.9 % (ref 11.6–15.1)
GFR SERPL CREATININE-BSD FRML MDRD: 70 ML/MIN/1.73SQ M
GLUCOSE SERPL-MCNC: 90 MG/DL (ref 65–140)
HCT VFR BLD AUTO: 38.1 % (ref 34.8–46.1)
HGB BLD-MCNC: 12.6 G/DL (ref 11.5–15.4)
IMM GRANULOCYTES # BLD AUTO: 0.02 THOUSAND/UL (ref 0–0.2)
IMM GRANULOCYTES NFR BLD AUTO: 0 % (ref 0–2)
LYMPHOCYTES # BLD AUTO: 1.81 THOUSANDS/ΜL (ref 0.6–4.47)
LYMPHOCYTES NFR BLD AUTO: 23 % (ref 14–44)
MCH RBC QN AUTO: 32.1 PG (ref 26.8–34.3)
MCHC RBC AUTO-ENTMCNC: 33.1 G/DL (ref 31.4–37.4)
MCV RBC AUTO: 97 FL (ref 82–98)
MONOCYTES # BLD AUTO: 0.37 THOUSAND/ΜL (ref 0.17–1.22)
MONOCYTES NFR BLD AUTO: 5 % (ref 4–12)
NEUTROPHILS # BLD AUTO: 5.62 THOUSANDS/ΜL (ref 1.85–7.62)
NEUTS SEG NFR BLD AUTO: 70 % (ref 43–75)
NRBC BLD AUTO-RTO: 0 /100 WBCS
P AXIS: 61 DEGREES
PLATELET # BLD AUTO: 320 THOUSANDS/UL (ref 149–390)
PMV BLD AUTO: 9.4 FL (ref 8.9–12.7)
POTASSIUM SERPL-SCNC: 3.4 MMOL/L (ref 3.5–5.3)
PR INTERVAL: 174 MS
QRS AXIS: 39 DEGREES
QRSD INTERVAL: 98 MS
QT INTERVAL: 382 MS
QTC INTERVAL: 446 MS
RBC # BLD AUTO: 3.92 MILLION/UL (ref 3.81–5.12)
SODIUM SERPL-SCNC: 140 MMOL/L (ref 135–147)
T WAVE AXIS: -2 DEGREES
VENTRICULAR RATE: 82 BPM
WBC # BLD AUTO: 7.96 THOUSAND/UL (ref 4.31–10.16)

## 2024-11-24 PROCEDURE — 93005 ELECTROCARDIOGRAM TRACING: CPT

## 2024-11-24 PROCEDURE — 36415 COLL VENOUS BLD VENIPUNCTURE: CPT

## 2024-11-24 PROCEDURE — 99284 EMERGENCY DEPT VISIT MOD MDM: CPT

## 2024-11-24 PROCEDURE — 70450 CT HEAD/BRAIN W/O DYE: CPT

## 2024-11-24 PROCEDURE — 80048 BASIC METABOLIC PNL TOTAL CA: CPT

## 2024-11-24 PROCEDURE — 85025 COMPLETE CBC W/AUTO DIFF WBC: CPT

## 2024-11-24 PROCEDURE — 93010 ELECTROCARDIOGRAM REPORT: CPT | Performed by: INTERNAL MEDICINE

## 2024-11-24 RX ORDER — MECLIZINE HYDROCHLORIDE 25 MG/1
25 TABLET ORAL ONCE
Status: COMPLETED | OUTPATIENT
Start: 2024-11-24 | End: 2024-11-24

## 2024-11-24 RX ORDER — MECLIZINE HYDROCHLORIDE 25 MG/1
25 TABLET ORAL 3 TIMES DAILY PRN
Qty: 45 TABLET | Refills: 0 | Status: SHIPPED | OUTPATIENT
Start: 2024-11-24 | End: 2024-12-09

## 2024-11-24 RX ADMIN — MECLIZINE HYDROCHLORIDE 25 MG: 25 TABLET ORAL at 09:02

## 2024-11-24 NOTE — Clinical Note
Yasmeen Castle was seen and treated in our emergency department on 11/24/2024.                Diagnosis:     Yasmeen  may return to work on return date.    She may return on this date: 11/27/2024         If you have any questions or concerns, please don't hesitate to call.      Evangelina Baez PA-C    ______________________________           _______________          _______________  Hospital Representative                              Date                                Time

## 2024-11-24 NOTE — ED ATTENDING ATTESTATION
11/24/2024  ISergio MD, saw and evaluated the patient. I have discussed the patient with the resident/non-physician practitioner and agree with the resident's/non-physician practitioner's findings, Plan of Care, and MDM as documented in the resident's/non-physician practitioner's note, except where noted. All available labs and Radiology studies were reviewed.  I was present for key portions of any procedure(s) performed by the resident/non-physician practitioner and I was immediately available to provide assistance.       At this point I agree with the current assessment done in the Emergency Department.  I have conducted an independent evaluation of this patient a history and physical is as follows: Also seen by me and evaluated  69-year-old female complains of dizziness worse with movement of her head resolving at rest over the last 3 weeks.  Apparently saw an otolaryngologist and advised to take Flonase for her left ear which also gives her some tinnitus.  Patient reports the tinnitus is very irritating.  Patient denies any trauma to her head.  Denies any focal weakness.  Patient reports she was scheduled for she thought was a CT scan it appears to be an MRI but not until mid December.  Patient was concerned because she has persistent dizziness.  Physical exam awake and alert, head no sign of trauma, neck is supple, pupils equal and reactive to light extraocular motions are intact, lungs are clear heart regular rate and rhythm, neurological exam without focality.    Due to the fact that the patient persistent symptoms will CT to rule out acute pathology was negative.  Laboratory testing is normal.  Presentation most consistent with peripheral vertigo we will treat with meclizine.      ED Course     Normal laboratory testing and imaging, reassured by advanced practitioner.  We discussed follow-up.    Critical Care Time  Procedures

## 2024-11-24 NOTE — ED PROVIDER NOTES
Time reflects when diagnosis was documented in both MDM as applicable and the Disposition within this note       Time User Action Codes Description Comment    11/24/2024  9:44 AM LazaroamairaniEvangelina Rosalind [H81.399] Peripheral vertigo           ED Disposition       ED Disposition   Discharge    Condition   Stable    Date/Time   Sun Nov 24, 2024  9:44 AM    Comment   Yasmeen Castle discharge to home/self care.                   Assessment & Plan       Medical Decision Making  Patient is a 68 y/o F who presents complaining of dizziness onset 3 weeks ago. Patient describes the quality as feeling off-balance with occasional room-spinning sensation. States symptoms have worsened over the last 3 days. Patient was seen by her PCP for this who scheduled her for an MRI in December. Patient has not taken any medications for symptoms. Reports symptoms always improve with being still and rest and only worsened by head movement or standing up. Reports associated constant tinnitus on L with occasional ear pain. Reports occasional nausea over last three days but no vomiting. Reports symptoms starting shortly after strep throat infection. Denies fever, chills, hearing loss, numbness, weakness, trouble walking, chest pain, shortness of breath, visual disturbances, trouble speaking, visual disturbances, head trauma, or other symptoms at this time.     Afebrile. VSS. No acute distress. Normal neurologic exam. No nystagmus. Ears clear.     Ordered CT head w/o contrast, CBC, BMP, and meclizine. Ct and blood work WNL. CT shows some intranasal congestion. Patient feeling better after receiving meclizine.    Recommended meclizine, saline nasal spray, and epley maneuver. Prescribed meclizine. Advised to f/u with PCP and ENT. Case discussed with Dr. Villasenor. Advised patient to return with any new or worsening symptoms including but not limited to worsening or uncontrolled symptoms, worsening headache, chest pain, shortness of breath, persistent  vomiting, vision changes, fainting, or for any other concerning symptoms. Discussed assessment and plan with patient who verbalizes understanding and agrees with plan.    Amount and/or Complexity of Data Reviewed  Labs: ordered. Decision-making details documented in ED Course.  Radiology: ordered. Decision-making details documented in ED Course.  ECG/medicine tests:  Decision-making details documented in ED Course.        ED Course as of 11/24/24 1006   Sun Nov 24, 2024   0826 ECG 12 lead  NSR at 82 pm. Nonspecific T wave abnormality.    0925 WBC: 7.96   0925 RBC: 3.92   0925 Hemoglobin: 12.6   0925 Hematocrit: 38.1   0926 Blood Pressure: 154/78   0926 Temperature: 98 °F (36.7 °C)   0926 Pulse: 87   0926 Respirations: 18   0926 SpO2: 96 %   0926 ECG 12 lead  No acute bleed. No acute intracranial abnormality.    0929 Sodium: 140   0929 Chloride: 104   0929 Potassium(!): 3.4   0929 BUN: 11   0929 Creatinine: 0.86   0929 GLUCOSE: 90   0929 Calcium: 8.9   0943 CT head without contrast  Trace mucosal thickening scattered in the paranasal sinuses.   No acute intracranial abnormality.   1000 Patient feels better after receiving meclizine       Medications   meclizine (ANTIVERT) tablet 25 mg (25 mg Oral Given 11/24/24 0902)       ED Risk Strat Scores                   Identification of Seniors at Risk      Flowsheet Row Most Recent Value   (ISAR) Identification of Seniors at Risk    Before the illness or injury that brought you to the Emergency, did you need someone to help you on a regular basis? 0 Filed at: 11/24/2024 0826   In the last 24 hours, have you needed more help than usual? 0 Filed at: 11/24/2024 0826   Have you been hospitalized for one or more nights during the past 6 months? 0 Filed at: 11/24/2024 0826   In general, do you see well? 0 Filed at: 11/24/2024 0826   In general, do you have serious problems with your memory? 0 Filed at: 11/24/2024 0826   Do you take more than three different medications every day?  "1 Filed at: 11/24/2024 0826   ISAR Score 1 Filed at: 11/24/2024 0826                SBIRT 22yo+      Flowsheet Row Most Recent Value   Initial Alcohol Screen: US AUDIT-C     1. How often do you have a drink containing alcohol? 0 Filed at: 11/24/2024 0826   2. How many drinks containing alcohol do you have on a typical day you are drinking?  0 Filed at: 11/24/2024 0826   3b. FEMALE Any Age, or MALE 65+: How often do you have 4 or more drinks on one occassion? 0 Filed at: 11/24/2024 0826   Audit-C Score 0 Filed at: 11/24/2024 0826   JACOB: How many times in the past year have you...    Used an illegal drug or used a prescription medication for non-medical reasons? Never Filed at: 11/24/2024 0826                            History of Present Illness       Chief Complaint   Patient presents with    Dizziness     Patient co dizziness that started a few weeks ago. Patient reports \"ringing in left ear.\" Denies head trauma. Patient was seen by PCP for this and reports she is scheduled for a CT scan.  Patient states \"the dizziness has just been worse lately especially with movement.\"        Past Medical History:   Diagnosis Date    Bunion, right 5/6/2022    Chronic GERD 2/7/2018    Hypertension     Trochanteric bursitis 10/21/2019      Past Surgical History:   Procedure Laterality Date    GASTRECTOMY SLEEVE LAPAROSCOPIC      [place] ARTHRODESIS GREAT TOE METATARSOPHALANGEAL JOINT Right 7/22/2022    Procedure: ARTHRODESIS / FUSION FOOT;  Surgeon: Yan Hayden DPM;  Location: [facility];  Service: Podiatry    UTERINE FIBROID SURGERY        Family History   Problem Relation Age of Onset    Hypertension Mother     Diabetes Mother     No Known Problems Father     No Known Problems Maternal Grandmother     No Known Problems Maternal Grandfather     No Known Problems Paternal Grandmother     No Known Problems Paternal Grandfather     No Known Problems Maternal Aunt     No Known Problems Maternal Aunt     No Known Problems " Paternal Aunt     No Known Problems Paternal Aunt     No Known Problems Paternal Uncle     No Known Problems Paternal Uncle     No Known Problems Half-Sister     No Known Problems Half-Sister       Social History     Tobacco Use    Smoking status: Never    Smokeless tobacco: Never   Vaping Use    Vaping status: Never Used   Substance Use Topics    Alcohol use: No    Drug use: No      E-Cigarette/Vaping    E-Cigarette Use Never User       E-Cigarette/Vaping Substances    Nicotine No     THC No     CBD No     Flavoring No       I have reviewed and agree with the history as documented.     Patient is a 70 y/o F who presents complaining of dizziness onset 3 weeks ago. Patient describes the quality as feeling off-balance with occasional room-spinning sensation. States symptoms have worsened over the last 3 days. Patient was seen by her PCP for this who scheduled her for an MRI in December. Patient has not taken any medications for symptoms. Reports symptoms always improve with being still and rest and only worsened by head movement or standing up. Reports associated constant tinnitus on L with occasional ear pain. Reports occasional nausea over last three days but no vomiting. Reports symptoms starting shortly after strep throat infection. Denies fever, chills, hearing loss, numbness, weakness, trouble walking, chest pain, shortness of breath, visual disturbances, trouble speaking, visual disturbances, head trauma, or other symptoms at this time.       History provided by:  Patient   used: No    Dizziness  Quality:  Imbalance and room spinning  Severity:  Moderate  Onset quality:  Gradual  Duration:  3 weeks  Timing:  Constant  Progression:  Worsening  Chronicity:  New  Context: not with head movement and not when standing up    Relieved by:  Lying down and being still  Worsened by:  Movement and standing up  Ineffective treatments:  None tried  Associated symptoms: nausea and tinnitus    Associated  symptoms: no chest pain, no hearing loss, no palpitations, no shortness of breath, no vomiting and no weakness    Nausea:     Severity:  Mild    Onset quality:  Gradual    Duration:  3 days    Timing:  Intermittent    Progression:  Resolved      Review of Systems   Constitutional: Negative.  Negative for chills and fever.   HENT:  Positive for ear pain and tinnitus. Negative for hearing loss and sore throat.    Eyes:  Positive for pain and redness. Negative for visual disturbance.   Respiratory:  Negative for shortness of breath.    Cardiovascular:  Negative for chest pain and palpitations.   Gastrointestinal:  Positive for nausea. Negative for abdominal pain and vomiting.   Genitourinary: Negative.    Musculoskeletal: Negative.  Negative for gait problem.   Skin: Negative.  Negative for color change and rash.   Neurological:  Positive for dizziness. Negative for syncope, speech difficulty, weakness and numbness.   All other systems reviewed and are negative.          Objective       ED Triage Vitals [11/24/24 0821]   Temperature Pulse Blood Pressure Respirations SpO2 Patient Position - Orthostatic VS   98 °F (36.7 °C) 87 154/78 18 96 % Sitting      Temp Source Heart Rate Source BP Location FiO2 (%) Pain Score    Temporal Monitor Left arm -- --      Vitals      Date and Time Temp Pulse SpO2 Resp BP Pain Score FACES Pain Rating User   11/24/24 0909 -- 75 95 % -- -- -- -- AA   11/24/24 0821 98 °F (36.7 °C) 87 96 % 18 154/78 -- -- RO            Physical Exam  Vitals and nursing note reviewed.   Constitutional:       General: She is awake. She is not in acute distress.     Appearance: Normal appearance. She is well-developed and well-groomed. She is not ill-appearing, toxic-appearing or diaphoretic.      Comments: Patient is resting comfortably on bed, in no acute distress.  Pleasant and cooperative.    HENT:      Head: Normocephalic and atraumatic.      Right Ear: Tympanic membrane, ear canal and external ear normal.       Left Ear: Tympanic membrane, ear canal and external ear normal.      Nose: Nose normal.      Mouth/Throat:      Lips: Pink.      Mouth: Mucous membranes are moist.      Dentition: Normal dentition.      Tongue: No lesions. Tongue does not deviate from midline.      Palate: No mass and lesions.      Pharynx: Oropharynx is clear. Uvula midline. No pharyngeal swelling or posterior oropharyngeal erythema.      Tonsils: No tonsillar exudate or tonsillar abscesses.   Eyes:      General: Lids are normal.         Right eye: No discharge.         Left eye: No discharge.      Extraocular Movements: Extraocular movements intact.      Right eye: Normal extraocular motion and no nystagmus.      Left eye: Normal extraocular motion and no nystagmus.      Conjunctiva/sclera:      Right eye: Right conjunctiva is not injected. No chemosis, exudate or hemorrhage.     Left eye: Left conjunctiva is injected. No chemosis, exudate or hemorrhage.     Pupils: Pupils are equal, round, and reactive to light.      Comments: Patient has been dx with a L corneal abrasion which she is being treated for with ofloxacin drops.    Cardiovascular:      Rate and Rhythm: Normal rate and regular rhythm.      Heart sounds: Normal heart sounds. No murmur heard.  Pulmonary:      Effort: Pulmonary effort is normal. No tachypnea or respiratory distress.      Breath sounds: Normal breath sounds and air entry. No stridor. No decreased breath sounds, wheezing, rhonchi or rales.   Musculoskeletal:         General: No swelling. Normal range of motion.      Cervical back: Normal range of motion.   Skin:     General: Skin is warm and dry.   Neurological:      General: No focal deficit present.      Mental Status: She is alert and oriented to person, place, and time.      GCS: GCS eye subscore is 4. GCS verbal subscore is 5. GCS motor subscore is 6.      Cranial Nerves: No cranial nerve deficit, dysarthria or facial asymmetry.      Sensory: Sensation is intact.  No sensory deficit.      Motor: Motor function is intact. No weakness or pronator drift.      Coordination: Coordination is intact. Romberg sign negative. Coordination normal. Finger-Nose-Finger Test and Heel to Shin Test normal.      Gait: Gait is intact. Gait normal.   Psychiatric:         Mood and Affect: Mood normal.         Speech: Speech normal.         Behavior: Behavior normal. Behavior is cooperative.         Results Reviewed       Procedure Component Value Units Date/Time    Basic metabolic panel [[id number]]  (Abnormal) Collected: 11/24/24 0906    Lab Status: Final result Specimen: Blood from Arm, Left Updated: 11/24/24 0925     Sodium 140 mmol/L      Potassium 3.4 mmol/L      Chloride 104 mmol/L      CO2 29 mmol/L      ANION GAP 7 mmol/L      BUN 11 mg/dL      Creatinine 0.86 mg/dL      Glucose 90 mg/dL      Calcium 8.9 mg/dL      eGFR 70 ml/min/1.73sq m     Narrative:      National Kid[employer] Foundation guidelines for Chronic Kidney Disease (CKD):     Stage 1 with normal or high GFR (GFR > 90 mL/min/1.73 square meters)    Stage 2 Mild CKD (GFR = 60-89 mL/min/1.73 square meters)    Stage 3A Moderate CKD (GFR = 45-59 mL/min/1.73 square meters)    Stage 3B Moderate CKD (GFR = 30-44 mL/min/1.73 square meters)    Stage 4 Severe CKD (GFR = 15-29 mL/min/1.73 square meters)    Stage 5 End Stage CKD (GFR <15 mL/min/1.73 square meters)  Note: GFR calculation is accurate only with a steady state creatinine    CBC and differential [[id number]] Collected: 11/24/24 0906    Lab Status: Final result Specimen: Blood from Arm, Left Updated: 11/24/24 0912     WBC 7.96 Thousand/uL      RBC 3.92 Million/uL      Hemoglobin 12.6 g/dL      Hematocrit 38.1 %      MCV 97 fL      MCH 32.1 pg      MCHC 33.1 g/dL      RDW 12.9 %      MPV 9.4 fL      Platelets 320 Thousands/uL      nRBC 0 /100 WBCs      Segmented % 70 %      Immature Grans % 0 %      Lymphocytes % 23 %      Monocytes % 5 %      Eosinophils Relative 1 %       Basophils Relative 1 %      Absolute Neutrophils 5.62 Thousands/µL      Absolute Immature Grans 0.02 Thousand/uL      Absolute Lymphocytes 1.81 Thousands/µL      Absolute Monocytes 0.37 Thousand/µL      Eosinophils Absolute 0.10 Thousand/µL      Basophils Absolute 0.04 Thousands/µL             CT head without contrast   Final Interpretation by Jamshid Engel MD (11/24 0930)      No acute intracranial abnormality.                  Workstation performed: [id number]             Procedures    ED Medication and Procedure Management   Prior to Admission Medications   Prescriptions Last Dose Informant Patient Reported? Taking?   Diclofenac Sodium (VOLTAREN) 1 %   No No   Sig: Apply 2 g topically 2 (two) times a day for 15 days   acetaminophen (TYLENOL) 500 mg tablet   Yes No   Sig: Take 500 mg by mouth every 6 (six) hours as needed for mild pain   albuterol (ProAir HFA) 90 mcg/act inhaler   No No   Sig: Inhale 2 puffs every 6 (six) hours as needed for wheezing   ergocalciferol (VITAMIN D2) 50,000 units   No No   Sig: Take 1 capsule (50,000 Units total) by mouth once a week   ofloxacin (OCUFLOX) 0.3 % ophthalmic solution   No No   Sig: Administer 1 drop into the left eye 4 (four) times a day   valsartan-hydrochlorothiazide (DIOVAN-HCT) 80-12.5 MG per tablet   No No   Sig: Take 1 tablet by mouth daily   vitamin B-12 (CYANOCOBALAMIN) 250 MCG TABS   No No   Sig: Take 1 tablet (250 mcg total) by mouth daily      Facility-Administered Medications: None     Current Discharge Medication List        START taking these medications    Details   meclizine (ANTIVERT) 25 mg tablet Take 1 tablet (25 mg total) by mouth 3 (three) times a day as needed for dizziness for up to 15 days  Qty: 45 tablet, Refills: 0    Associated Diagnoses: Peripheral vertigo           CONTINUE these medications which have NOT CHANGED    Details   acetaminophen (TYLENOL) 500 mg tablet Take 500 mg by mouth every 6 (six) hours as needed for mild pain       albuterol (ProAir HFA) 90 mcg/act inhaler Inhale 2 puffs every 6 (six) hours as needed for wheezing  Qty: 8.5 g, Refills: 0    Comments: Substitution to a formulary equivalent within the same pharmaceutical class is authorized.  Associated Diagnoses: Acute cough      Diclofenac Sodium (VOLTAREN) 1 % Apply 2 g topically 2 (two) times a day for 15 days  Qty: 60 g, Refills: 0    Associated Diagnoses: Pain in left hand      ergocalciferol (VITAMIN D2) 50,000 units Take 1 capsule (50,000 Units total) by mouth once a week  Qty: 12 capsule, Refills: 1    Associated Diagnoses: Vitamin D deficiency      ofloxacin (OCUFLOX) 0.3 % ophthalmic solution Administer 1 drop into the left eye 4 (four) times a day  Qty: 5 mL, Refills: 0    Associated Diagnoses: Abrasion of left cornea, initial encounter      valsartan-hydrochlorothiazide (DIOVAN-HCT) 80-12.5 MG per tablet Take 1 tablet by mouth daily  Qty: 90 tablet, Refills: 3    Associated Diagnoses: Essential hypertension      vitamin B-12 (CYANOCOBALAMIN) 250 MCG TABS Take 1 tablet (250 mcg total) by mouth daily  Qty: 90 tablet, Refills: 1    Associated Diagnoses: Vitamin B 12 deficiency           No discharge procedures on file.  ED SEPSIS DOCUMENTATION   Time reflects when diagnosis was documented in both MDM as applicable and the Disposition within this note       Time User Action Codes Description Comment    11/24/2024  9:44 AM Evangelina Baez Add [H81.399] Peripheral vertigo                  Evangelina Baez PA-C  11/24/24 1006

## 2024-11-24 NOTE — DISCHARGE INSTRUCTIONS
You have been evaluated in the Emergency Department today for dizziness. Your evaluation suggests that your symptoms are most likely due to peripheral vertigo.  You have been prescribed meclizine to help relieve your symptoms. Please take your prescription as directed. You can also try Epley Maneuvers at home to help relieve your symptoms- instructions are available online.  Please follow up with your primary care doctor in 2-3 days.  Return to the ER immediately for worsening or uncontrolled symptoms, worsening headache, chest pain, shortness of breath, persistent vomiting, vision changes, fainting, or for any other concerning symptoms.

## 2024-11-29 ENCOUNTER — TELEPHONE (OUTPATIENT)
Age: 69
End: 2024-11-29

## 2024-11-29 NOTE — TELEPHONE ENCOUNTER
Pt called stating she was seen in the ED this past Sunday and diagnosed with Vertigo. States she has a lot of drainage into inner ear and causing the dizziness. Pt also states she has polyps in left nostril and was recommended to see a surgeon.  Pt is feeling a little better and hoping to return back to work tonight. States she has a lot of upcoming doctor appointments and is asking if PCP would fill out FMLA forms?    Please advise

## 2024-11-29 NOTE — TELEPHONE ENCOUNTER
11/29 Attempted to reach patient, someone on the other end picked up and hung up. Will re-try later.

## 2024-12-02 NOTE — TELEPHONE ENCOUNTER
Spoke to the patient, she will be getting the form, the list of dates that she missed and her upcoming appointment dates from her job today and call back tomorrow morning with all of the information for Sherron to start filling out the form.

## 2024-12-04 ENCOUNTER — APPOINTMENT (OUTPATIENT)
Dept: LAB | Facility: CLINIC | Age: 69
End: 2024-12-04
Payer: COMMERCIAL

## 2024-12-04 ENCOUNTER — OFFICE VISIT (OUTPATIENT)
Dept: URGENT CARE | Facility: CLINIC | Age: 69
End: 2024-12-04
Payer: COMMERCIAL

## 2024-12-04 ENCOUNTER — APPOINTMENT (OUTPATIENT)
Dept: RADIOLOGY | Facility: CLINIC | Age: 69
End: 2024-12-04
Payer: COMMERCIAL

## 2024-12-04 VITALS
OXYGEN SATURATION: 94 % | SYSTOLIC BLOOD PRESSURE: 150 MMHG | HEIGHT: 63 IN | BODY MASS INDEX: 32.43 KG/M2 | DIASTOLIC BLOOD PRESSURE: 79 MMHG | WEIGHT: 183 LBS | HEART RATE: 82 BPM

## 2024-12-04 DIAGNOSIS — H57.89 IRRITATION OF BOTH EYES: Primary | ICD-10-CM

## 2024-12-04 DIAGNOSIS — H20.013 PRIMARY IRIDOCYCLITIS OF BOTH EYES: ICD-10-CM

## 2024-12-04 LAB
BASOPHILS # BLD AUTO: 0.03 THOUSANDS/ΜL (ref 0–0.1)
BASOPHILS NFR BLD AUTO: 0 % (ref 0–1)
EOSINOPHIL # BLD AUTO: 0.18 THOUSAND/ΜL (ref 0–0.61)
EOSINOPHIL NFR BLD AUTO: 3 % (ref 0–6)
ERYTHROCYTE [DISTWIDTH] IN BLOOD BY AUTOMATED COUNT: 12.8 % (ref 11.6–15.1)
HCT VFR BLD AUTO: 41.9 % (ref 34.8–46.1)
HGB BLD-MCNC: 13.6 G/DL (ref 11.5–15.4)
IMM GRANULOCYTES # BLD AUTO: 0.01 THOUSAND/UL (ref 0–0.2)
IMM GRANULOCYTES NFR BLD AUTO: 0 % (ref 0–2)
LYMPHOCYTES # BLD AUTO: 1.84 THOUSANDS/ΜL (ref 0.6–4.47)
LYMPHOCYTES NFR BLD AUTO: 25 % (ref 14–44)
MCH RBC QN AUTO: 32.7 PG (ref 26.8–34.3)
MCHC RBC AUTO-ENTMCNC: 32.5 G/DL (ref 31.4–37.4)
MCV RBC AUTO: 101 FL (ref 82–98)
MONOCYTES # BLD AUTO: 0.28 THOUSAND/ΜL (ref 0.17–1.22)
MONOCYTES NFR BLD AUTO: 4 % (ref 4–12)
NEUTROPHILS # BLD AUTO: 4.95 THOUSANDS/ΜL (ref 1.85–7.62)
NEUTS SEG NFR BLD AUTO: 68 % (ref 43–75)
NRBC BLD AUTO-RTO: 0 /100 WBCS
PLATELET # BLD AUTO: 353 THOUSANDS/UL (ref 149–390)
PMV BLD AUTO: 10.7 FL (ref 8.9–12.7)
RBC # BLD AUTO: 4.16 MILLION/UL (ref 3.81–5.12)
WBC # BLD AUTO: 7.29 THOUSAND/UL (ref 4.31–10.16)

## 2024-12-04 PROCEDURE — 86780 TREPONEMA PALLIDUM: CPT

## 2024-12-04 PROCEDURE — 81374 HLA I TYPING 1 ANTIGEN LR: CPT

## 2024-12-04 PROCEDURE — 86430 RHEUMATOID FACTOR TEST QUAL: CPT

## 2024-12-04 PROCEDURE — 82164 ANGIOTENSIN I ENZYME TEST: CPT

## 2024-12-04 PROCEDURE — 71047 X-RAY EXAM CHEST 3 VIEWS: CPT

## 2024-12-04 PROCEDURE — 99213 OFFICE O/P EST LOW 20 MIN: CPT

## 2024-12-04 PROCEDURE — 85025 COMPLETE CBC W/AUTO DIFF WBC: CPT

## 2024-12-04 PROCEDURE — 36415 COLL VENOUS BLD VENIPUNCTURE: CPT

## 2024-12-04 PROCEDURE — 86618 LYME DISEASE ANTIBODY: CPT

## 2024-12-04 NOTE — PATIENT INSTRUCTIONS
Follow up with eye doctor today.    Follow up with PCP in 3-5 days.  Proceed to the ER with worsening symptoms.

## 2024-12-04 NOTE — PROGRESS NOTES
[facility]        NAME: Yasmeen Castle is a 69 y.o. female  : 1955    MRN: [id number]  DATE: 2024  TIME: 9:10 AM    Assessment and Plan   Irritation of both eyes [H57.89]  1. Irritation of both eyes          Differential includes corneal abrasions, dry eyes, allergic conjunctivitis. Antibiotic eye drops not improving symptoms. Patient to follow up with eye doctor today for further evaluation.   Work note given.     Patient Instructions     Follow up with eye doctor today.    Follow up with PCP in 3-5 days.  Proceed to the ER with worsening symptoms.     Chief Complaint     Chief Complaint   Patient presents with    Eye Problem     Both eyes, feels as if there is gravel, itching, burning. She has been using the antibiotics that were prescribed to her. The prescription has not been helping. The eyes both tear as well. Is afraid of driving because of this discomfort.          History of Present Illness       The patient presents today with complaints of BL eye irritation, redness, itchiness that has been ongoing for over 1 month. She was seen here in 10/31/2024 and diagnosed with L corneal abrasion. She was given ofloxacin, but states her symptoms never improved, and now has similar symptoms in her R eye. She has also been having difficulty with her vision especially at night. Denies crustiness. Wears glasses, but not contacts. Has not tried any other OTC eye drops for her symptoms, and has not reached out to her eye MD for a follow up as of yet. She was also recently seen in the ER on 24 and diagnosed with vertigo which she was given meclizine for.         Review of Systems   Review of Systems   Constitutional:  Negative for chills and fever.   Eyes:  Positive for discharge (clear watery), redness, itching and visual disturbance. Negative for photophobia and pain.         Current Medications       Current Outpatient Medications:     acetaminophen (TYLENOL) 500 mg tablet, Take 500  mg by mouth every 6 (six) hours as needed for mild pain, Disp: , Rfl:     albuterol (ProAir HFA) 90 mcg/act inhaler, Inhale 2 puffs every 6 (six) hours as needed for wheezing, Disp: 8.5 g, Rfl: 0    Diclofenac Sodium (VOLTAREN) 1 %, Apply 2 g topically 2 (two) times a day for 15 days, Disp: 60 g, Rfl: 0    ergocalciferol (VITAMIN D2) 50,000 units, Take 1 capsule (50,000 Units total) by mouth once a week, Disp: 12 capsule, Rfl: 1    meclizine (ANTIVERT) 25 mg tablet, Take 1 tablet (25 mg total) by mouth 3 (three) times a day as needed for dizziness for up to 15 days, Disp: 45 tablet, Rfl: 0    ofloxacin (OCUFLOX) 0.3 % ophthalmic solution, Administer 1 drop into the left eye 4 (four) times a day, Disp: 5 mL, Rfl: 0    valsartan-hydrochlorothiazide (DIOVAN-HCT) 80-12.5 MG per tablet, Take 1 tablet by mouth daily, Disp: 90 tablet, Rfl: 3    vitamin B-12 (CYANOCOBALAMIN) 250 MCG TABS, Take 1 tablet (250 mcg total) by mouth daily, Disp: 90 tablet, Rfl: 1    Current Allergies     Allergies as of 12/04/2024 - Reviewed 12/04/2024   Allergen Reaction Noted    Aspirin Diarrhea and Vomiting 11/23/2016            The following portions of the patient's history were reviewed and updated as appropriate: allergies, current medications, past family history, past medical history, past social history, past surgical history and problem list.     Past Medical History:   Diagnosis Date    Bunion, right 5/6/2022    Chronic GERD 2/7/2018    Hypertension     Trochanteric bursitis 10/21/2019       Past Surgical History:   Procedure Laterality Date    GASTRECTOMY SLEEVE LAPAROSCOPIC      [place] ARTHRODESIS GREAT TOE METATARSOPHALANGEAL JOINT Right 7/22/2022    Procedure: ARTHRODESIS / FUSION FOOT;  Surgeon: Yan Hayden DPM;  Location: [facility];  Service: Podiatry    UTERINE FIBROID SURGERY         Family History   Problem Relation Age of Onset    Hypertension Mother     Diabetes Mother     No Known Problems Father     No Known  "Problems Maternal Grandmother     No Known Problems Maternal Grandfather     No Known Problems Paternal Grandmother     No Known Problems Paternal Grandfather     No Known Problems Maternal Aunt     No Known Problems Maternal Aunt     No Known Problems Paternal Aunt     No Known Problems Paternal Aunt     No Known Problems Paternal Uncle     No Known Problems Paternal Uncle     No Known Problems Half-Sister     No Known Problems Half-Sister          Medications have been verified.        Objective   /79 (Patient Position: Sitting, Cuff Size: Standard)   Pulse 82   Ht 5' 3\" (1.6 m)   Wt 83 kg (183 lb)   SpO2 94%   BMI 32.42 kg/m²        Physical Exam     Physical Exam  Vitals and nursing note reviewed.   Constitutional:       General: She is not in acute distress.     Appearance: Normal appearance.   HENT:      Head: Normocephalic and atraumatic.      Right Ear: External ear normal.      Left Ear: External ear normal.      Mouth/Throat:      Mouth: Mucous membranes are moist.   Eyes:      Conjunctiva/sclera:      Right eye: Right conjunctiva is injected. No chemosis, exudate or hemorrhage.     Left eye: Left conjunctiva is injected. No chemosis, exudate or hemorrhage.     Pupils: Pupils are equal, round, and reactive to light.      Comments: BL eyes teary.   Cardiovascular:      Rate and Rhythm: Normal rate.   Pulmonary:      Effort: Pulmonary effort is normal.   Skin:     General: Skin is warm and dry.   Neurological:      Mental Status: She is alert and oriented to person, place, and time. Mental status is at baseline.   Psychiatric:         Mood and Affect: Mood normal.         Behavior: Behavior normal.                   "

## 2024-12-04 NOTE — LETTER
December 4, 2024     Patient: Yasmeen Castle   YOB: 1955   Date of Visit: 12/4/2024       To Whom it May Concern:    Yasmeen Castle was seen in my clinic on 12/4/2024. She may return to work on 12/6/2024 .    If you have any questions or concerns, please don't hesitate to call.         Sincerely,          ROVERTO Gambino        CC: No Recipients

## 2024-12-05 LAB
ACE SERPL-CCNC: 49 U/L (ref 14–82)
B BURGDOR IGG+IGM SER QL IA: NEGATIVE
RHEUMATOID FACT SER QL LA: NEGATIVE
SL AMB TREPONEMA PALLIDUM ANTIBODIES: NON REACTIVE
TREPONEMA PALLIDUM IGG+IGM AB [PRESENCE] IN SERUM OR PLASMA BY IMMUNOASSAY: NORMAL

## 2024-12-09 LAB — HLA-B27 QL NAA+PROBE: NEGATIVE

## 2024-12-10 ENCOUNTER — HOSPITAL ENCOUNTER (OUTPATIENT)
Dept: MRI IMAGING | Facility: HOSPITAL | Age: 69
Discharge: HOME | End: 2024-12-10
Payer: COMMERCIAL

## 2024-12-10 DIAGNOSIS — H93.19 ASYMMETRIC SUBJECTIVE NONPULSATILE TINNITUS WITHOUT HEARING LOSS, OTOSCOPIC FINDING, NEUROLOGIC DEFICIT, OR HEAD TRAUMA: ICD-10-CM

## 2024-12-10 DIAGNOSIS — J33.9 RIGHT NASAL POLYPS: ICD-10-CM

## 2024-12-10 DIAGNOSIS — H93.12 NEW ONSET TINNITUS OF LEFT EAR: ICD-10-CM

## 2024-12-10 DIAGNOSIS — R42 DIZZINESS: ICD-10-CM

## 2024-12-10 PROCEDURE — A9585 GADOBUTROL INJECTION: HCPCS | Performed by: NURSE PRACTITIONER

## 2024-12-10 PROCEDURE — 70553 MRI BRAIN STEM W/O & W/DYE: CPT

## 2024-12-10 RX ORDER — GADOBUTROL 604.72 MG/ML
8 INJECTION INTRAVENOUS
Status: COMPLETED | OUTPATIENT
Start: 2024-12-10 | End: 2024-12-10

## 2024-12-10 RX ADMIN — GADOBUTROL 8 ML: 604.72 INJECTION INTRAVENOUS at 22:12

## 2024-12-12 ENCOUNTER — RESULTS FOLLOW-UP (OUTPATIENT)
Dept: FAMILY MEDICINE CLINIC | Facility: CLINIC | Age: 69
End: 2024-12-12

## 2024-12-24 DIAGNOSIS — I10 ESSENTIAL HYPERTENSION: ICD-10-CM

## 2024-12-24 RX ORDER — VALSARTAN AND HYDROCHLOROTHIAZIDE 80; 12.5 MG/1; MG/1
1 TABLET, FILM COATED ORAL DAILY
Qty: 90 TABLET | Refills: 3 | Status: SHIPPED | OUTPATIENT
Start: 2024-12-24

## 2025-02-06 ENCOUNTER — APPOINTMENT (EMERGENCY)
Dept: RADIOLOGY | Facility: HOSPITAL | Age: 70
End: 2025-02-06
Payer: COMMERCIAL

## 2025-02-06 ENCOUNTER — HOSPITAL ENCOUNTER (EMERGENCY)
Facility: HOSPITAL | Age: 70
Discharge: HOME | End: 2025-02-06
Attending: EMERGENCY MEDICINE
Payer: COMMERCIAL

## 2025-02-06 VITALS
OXYGEN SATURATION: 98 % | RESPIRATION RATE: 16 BRPM | TEMPERATURE: 97.8 F | DIASTOLIC BLOOD PRESSURE: 77 MMHG | SYSTOLIC BLOOD PRESSURE: 154 MMHG | HEART RATE: 74 BPM

## 2025-02-06 DIAGNOSIS — R55 SYNCOPE: Primary | ICD-10-CM

## 2025-02-06 DIAGNOSIS — N39.0 UTI (URINARY TRACT INFECTION): ICD-10-CM

## 2025-02-06 LAB
2HR DELTA HS TROPONIN: 0 NG/L
ALBUMIN SERPL BCG-MCNC: 4.3 G/DL (ref 3.5–5)
ALP SERPL-CCNC: 100 U/L (ref 34–104)
ALT SERPL W P-5'-P-CCNC: 12 U/L (ref 7–52)
ANION GAP SERPL CALCULATED.3IONS-SCNC: 9 MMOL/L (ref 4–13)
AST SERPL W P-5'-P-CCNC: 15 U/L (ref 13–39)
BACTERIA UR QL AUTO: ABNORMAL /HPF
BASOPHILS # BLD AUTO: 0.04 THOUSANDS/ΜL (ref 0–0.1)
BASOPHILS NFR BLD AUTO: 1 % (ref 0–1)
BILIRUB SERPL-MCNC: 0.82 MG/DL (ref 0.2–1)
BILIRUB UR QL STRIP: NEGATIVE
BUN SERPL-MCNC: 16 MG/DL (ref 5–25)
CALCIUM SERPL-MCNC: 9.8 MG/DL (ref 8.4–10.2)
CAOX CRY URNS QL MICRO: ABNORMAL /HPF
CARDIAC TROPONIN I PNL SERPL HS: 3 NG/L (ref ?–50)
CARDIAC TROPONIN I PNL SERPL HS: 3 NG/L (ref ?–50)
CHLORIDE SERPL-SCNC: 104 MMOL/L (ref 96–108)
CLARITY UR: ABNORMAL
CO2 SERPL-SCNC: 26 MMOL/L (ref 21–32)
COLOR UR: YELLOW
CREAT SERPL-MCNC: 1.13 MG/DL (ref 0.6–1.3)
EOSINOPHIL # BLD AUTO: 0.13 THOUSAND/ΜL (ref 0–0.61)
EOSINOPHIL NFR BLD AUTO: 2 % (ref 0–6)
ERYTHROCYTE [DISTWIDTH] IN BLOOD BY AUTOMATED COUNT: 12.5 % (ref 11.6–15.1)
GFR SERPL CREATININE-BSD FRML MDRD: 50 ML/MIN/1.73SQ M
GLUCOSE SERPL-MCNC: 106 MG/DL (ref 65–140)
GLUCOSE SERPL-MCNC: 109 MG/DL (ref 65–140)
GLUCOSE UR STRIP-MCNC: NEGATIVE MG/DL
HCT VFR BLD AUTO: 42.9 % (ref 34.8–46.1)
HGB BLD-MCNC: 13.8 G/DL (ref 11.5–15.4)
HGB UR QL STRIP.AUTO: NEGATIVE
HYALINE CASTS #/AREA URNS LPF: ABNORMAL /LPF
IMM GRANULOCYTES # BLD AUTO: 0.03 THOUSAND/UL (ref 0–0.2)
IMM GRANULOCYTES NFR BLD AUTO: 0 % (ref 0–2)
KETONES UR STRIP-MCNC: NEGATIVE MG/DL
LEUKOCYTE ESTERASE UR QL STRIP: ABNORMAL
LYMPHOCYTES # BLD AUTO: 2 THOUSANDS/ΜL (ref 0.6–4.47)
LYMPHOCYTES NFR BLD AUTO: 25 % (ref 14–44)
MCH RBC QN AUTO: 31.9 PG (ref 26.8–34.3)
MCHC RBC AUTO-ENTMCNC: 32.2 G/DL (ref 31.4–37.4)
MCV RBC AUTO: 99 FL (ref 82–98)
MONOCYTES # BLD AUTO: 0.36 THOUSAND/ΜL (ref 0.17–1.22)
MONOCYTES NFR BLD AUTO: 4 % (ref 4–12)
MUCOUS THREADS UR QL AUTO: ABNORMAL
NEUTROPHILS # BLD AUTO: 5.54 THOUSANDS/ΜL (ref 1.85–7.62)
NEUTS SEG NFR BLD AUTO: 68 % (ref 43–75)
NITRITE UR QL STRIP: NEGATIVE
NON-SQ EPI CELLS URNS QL MICRO: ABNORMAL /HPF
NRBC BLD AUTO-RTO: 0 /100 WBCS
PH UR STRIP.AUTO: 5.5 [PH]
PLATELET # BLD AUTO: 332 THOUSANDS/UL (ref 149–390)
PMV BLD AUTO: 10.4 FL (ref 8.9–12.7)
POTASSIUM SERPL-SCNC: 3.5 MMOL/L (ref 3.5–5.3)
PROT SERPL-MCNC: 7.6 G/DL (ref 6.4–8.4)
PROT UR STRIP-MCNC: ABNORMAL MG/DL
RBC # BLD AUTO: 4.32 MILLION/UL (ref 3.81–5.12)
RBC #/AREA URNS AUTO: ABNORMAL /HPF
SODIUM SERPL-SCNC: 139 MMOL/L (ref 135–147)
SP GR UR STRIP.AUTO: 1.03 (ref 1–1.03)
TSH SERPL DL<=0.05 MIU/L-ACNC: 1.66 UIU/ML (ref 0.45–4.5)
UROBILINOGEN UR STRIP-ACNC: <2 MG/DL
WBC # BLD AUTO: 8.1 THOUSAND/UL (ref 4.31–10.16)
WBC #/AREA URNS AUTO: ABNORMAL /HPF

## 2025-02-06 PROCEDURE — 96360 HYDRATION IV INFUSION INIT: CPT

## 2025-02-06 PROCEDURE — 81001 URINALYSIS AUTO W/SCOPE: CPT | Performed by: PHYSICIAN ASSISTANT

## 2025-02-06 PROCEDURE — 99284 EMERGENCY DEPT VISIT MOD MDM: CPT

## 2025-02-06 PROCEDURE — 80053 COMPREHEN METABOLIC PANEL: CPT | Performed by: PHYSICIAN ASSISTANT

## 2025-02-06 PROCEDURE — 93005 ELECTROCARDIOGRAM TRACING: CPT

## 2025-02-06 PROCEDURE — 71045 X-RAY EXAM CHEST 1 VIEW: CPT

## 2025-02-06 PROCEDURE — 36415 COLL VENOUS BLD VENIPUNCTURE: CPT | Performed by: PHYSICIAN ASSISTANT

## 2025-02-06 PROCEDURE — 99285 EMERGENCY DEPT VISIT HI MDM: CPT | Performed by: PHYSICIAN ASSISTANT

## 2025-02-06 PROCEDURE — 96361 HYDRATE IV INFUSION ADD-ON: CPT

## 2025-02-06 PROCEDURE — 82948 REAGENT STRIP/BLOOD GLUCOSE: CPT

## 2025-02-06 PROCEDURE — 84443 ASSAY THYROID STIM HORMONE: CPT | Performed by: PHYSICIAN ASSISTANT

## 2025-02-06 PROCEDURE — 84484 ASSAY OF TROPONIN QUANT: CPT | Performed by: PHYSICIAN ASSISTANT

## 2025-02-06 PROCEDURE — 85025 COMPLETE CBC W/AUTO DIFF WBC: CPT | Performed by: PHYSICIAN ASSISTANT

## 2025-02-06 PROCEDURE — 87086 URINE CULTURE/COLONY COUNT: CPT | Performed by: PHYSICIAN ASSISTANT

## 2025-02-06 RX ORDER — CEPHALEXIN 500 MG/1
500 CAPSULE ORAL ONCE
Status: COMPLETED | OUTPATIENT
Start: 2025-02-06 | End: 2025-02-06

## 2025-02-06 RX ORDER — CEPHALEXIN 500 MG/1
500 CAPSULE ORAL EVERY 12 HOURS SCHEDULED
Qty: 14 CAPSULE | Refills: 0 | Status: SHIPPED | OUTPATIENT
Start: 2025-02-06 | End: 2025-02-13

## 2025-02-06 RX ADMIN — SODIUM CHLORIDE 1000 ML: 0.9 INJECTION, SOLUTION INTRAVENOUS at 07:03

## 2025-02-06 RX ADMIN — CEPHALEXIN 500 MG: 500 CAPSULE ORAL at 11:20

## 2025-02-06 NOTE — Clinical Note
Yasmeen Castle was seen and treated in our emergency department on 2/6/2025.    No restrictions            Diagnosis:     Yasmeen  may return to work on return date.    She may return on this date: 02/09/2025         If you have any questions or concerns, please don't hesitate to call.      Corinne Hemphill PA-C    ______________________________           _______________          _______________  Hospital Representative                              Date                                Time

## 2025-02-06 NOTE — ED PROVIDER NOTES
Time reflects when diagnosis was documented in both MDM as applicable and the Disposition within this note       Time User Action Codes Description Comment    2/6/2025 11:09 AM Corinne Hemphill Add [R55] Syncope     2/6/2025 11:09 AM Corinne Hemphill Add [N39.0] UTI (urinary tract infection)           ED Disposition       ED Disposition   Discharge    Condition   Stable    Date/Time   Thu Feb 6, 2025 11:09 AM    Comment   Yasmeen Castle discharge to home/self care.                   Assessment & Plan   I had discussion with patient and , she will follow-up with primary care doctor to further address her forgetfulness and reevaluate after treatment of UTI, she will also follow-up with cardiology for further investigation into the syncopal episode she had.  Ambulatory referral placed    Medical Decision Making  Differential diagnosis includes but is not limited to: Arrhythmia, anemia, abnormal TSH, electrolyte disturbance, JANAY, ACS    Amount and/or Complexity of Data Reviewed  Independent Historian: spouse  External Data Reviewed: radiology.     Details: See ED course  Labs: ordered.     Details: Results reviewed  Radiology: ordered.     Details: Report reviewed  ECG/medicine tests: independent interpretation performed.  Discussion of management or test interpretation with external provider(s): ED attending Dr. Delma Jimenez  Prescription drug management.        ED Course as of 02/06/25 1149   Thu Feb 06, 2025   0913 Patient appears happy, sitting up and smiling, on her phone, she states she is doing well   1003 Spoke with  on speaker phone with patient and he is concerned for her forgetfulness over the past month since she was evaluated by the eye doctor and the ENT doctor for vertigo.  Patient denies current vertigo.  She states she was evaluated with a CT and an MRI.  She is not on any new oral medications.  Patient was able to ambulate without difficulty, she states she is hungry.  Food provided, I  "asked her to provide urine sample as well, CT and MRI from November and December respectively reviewed - no acute findings       Medications   sodium chloride 0.9 % bolus 1,000 mL (0 mL Intravenous Stopped 2/6/25 1105)   cephalexin (KEFLEX) capsule 500 mg (500 mg Oral Given 2/6/25 1120)       ED Risk Strat Scores                          SBIRT 22yo+      Flowsheet Row Most Recent Value   Initial Alcohol Screen: US AUDIT-C     1. How often do you have a drink containing alcohol? 0 Filed at: 02/06/2025 0634   2. How many drinks containing alcohol do you have on a typical day you are drinking?  0 Filed at: 02/06/2025 0634   3b. FEMALE Any Age, or MALE 65+: How often do you have 4 or more drinks on one occassion? 0 Filed at: 02/06/2025 0634   Audit-C Score 0 Filed at: 02/06/2025 0634   JACOB: How many times in the past year have you...    Used an illegal drug or used a prescription medication for non-medical reasons? Never Filed at: 02/06/2025 0634                            History of Present Illness       Chief Complaint   Patient presents with    Syncope     Pt has not been feeling well for the past couple days, had a syncopal episode towards the end of her shift where she slid down the wall accompanied by a co worker. Was hypotensive & bradycardic for staff. Patient states she feels \"off\" and has  a hard time staying awake        Past Medical History:   Diagnosis Date    Bunion, right 05/06/2022    Chronic GERD 02/07/2018    Ear problems     occassiobnal pain    Hypertension     Nasal congestion     Tinnitus     Trochanteric bursitis 10/21/2019      Past Surgical History:   Procedure Laterality Date    GASTRECTOMY SLEEVE LAPAROSCOPIC      [place] ARTHRODESIS GREAT TOE METATARSOPHALANGEAL JOINT Right 7/22/2022    Procedure: ARTHRODESIS / FUSION FOOT;  Surgeon: Yan Hayden DPM;  Location: [facility];  Service: Podiatry    UTERINE FIBROID SURGERY        Family History   Problem Relation Age of Onset    " Hypertension Mother     Diabetes Mother     No Known Problems Father     No Known Problems Maternal Grandmother     No Known Problems Maternal Grandfather     No Known Problems Paternal Grandmother     No Known Problems Paternal Grandfather     No Known Problems Maternal Aunt     No Known Problems Maternal Aunt     No Known Problems Paternal Aunt     No Known Problems Paternal Aunt     No Known Problems Paternal Uncle     No Known Problems Paternal Uncle     No Known Problems Half-Sister     No Known Problems Half-Sister       Social History     Tobacco Use    Smoking status: Never    Smokeless tobacco: Never   Vaping Use    Vaping status: Never Used   Substance Use Topics    Alcohol use: No    Drug use: No      E-Cigarette/Vaping    E-Cigarette Use Never User       E-Cigarette/Vaping Substances    Nicotine No     THC No     CBD No     Flavoring No       I have reviewed and agree with the history as documented.     71yo female who presents to ER for evaluation after a syncopal episode at work just prior to arrival. 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  Her coworker said she looked very tired all night, not her normal self.  When she passed out she slid against the wall and the coworker helped her to the floor.  There was no injury.  Patient states she does not remember having any chest pain or palpitations prior to passing out.  She denies any difficulty with her speech.  She denies dizziness or headache.  She denies any weakness or numbness.  Denies any recent illness such as fever, cough, or cold symptoms.      History provided by:  Patient  Syncope  Associated symptoms: no chest pain, no dizziness, no fever, no headaches, no palpitations, no shortness of breath and no vomiting        Review of Systems   Constitutional:  Positive for fatigue. Negative for fever.   Eyes:  Negative for visual disturbance.   Respiratory:  Negative for shortness of breath.    Cardiovascular:  Positive for syncope.  Negative for chest pain and palpitations.   Gastrointestinal:  Negative for abdominal pain, diarrhea and vomiting.   Genitourinary:  Negative for difficulty urinating.   Musculoskeletal:  Negative for myalgias.   Skin:  Negative for rash.   Neurological:  Positive for syncope. Negative for dizziness and headaches.           Objective       ED Triage Vitals [02/06/25 0632]   Temperature Pulse Blood Pressure Respirations SpO2 Patient Position - Orthostatic VS   97.8 °F (36.6 °C) 65 100/57 18 94 % Lying      Temp Source Heart Rate Source BP Location FiO2 (%) Pain Score    Oral Monitor Right arm -- --      Vitals      Date and Time Temp Pulse SpO2 Resp BP Pain Score FACES Pain Rating User   02/06/25 0900 -- 74 98 % -- 154/77 -- -- JK   02/06/25 0645 -- 64 97 % 16 126/65 -- -- PT   02/06/25 0632 97.8 °F (36.6 °C) 65 94 % 18 100/57 -- -- PT            Physical Exam  Vitals and nursing note reviewed.   Constitutional:       Appearance: Normal appearance. She is well-developed.   HENT:      Head: Atraumatic.      Right Ear: External ear normal.      Left Ear: External ear normal.      Nose: Nose normal.      Mouth/Throat:      Mouth: Mucous membranes are moist.   Eyes:      General: No scleral icterus.     Extraocular Movements: Extraocular movements intact.      Conjunctiva/sclera: Conjunctivae normal.      Pupils: Pupils are equal, round, and reactive to light.   Cardiovascular:      Rate and Rhythm: Normal rate and regular rhythm.      Heart sounds: Normal heart sounds.   Pulmonary:      Effort: Pulmonary effort is normal.      Breath sounds: Normal breath sounds.   Abdominal:      General: Bowel sounds are normal. There is no distension.      Palpations: Abdomen is soft.      Tenderness: There is no abdominal tenderness. There is no right CVA tenderness or left CVA tenderness.   Musculoskeletal:         General: Normal range of motion.      Cervical back: Neck supple.      Right lower leg: No edema.      Left lower leg:  No edema.   Skin:     General: Skin is warm and dry.      Findings: No rash.   Neurological:      General: No focal deficit present.      Mental Status: She is alert and oriented to person, place, and time.      Cranial Nerves: No cranial nerve deficit.   Psychiatric:         Mood and Affect: Mood normal.         Results Reviewed       Procedure Component Value Units Date/Time    Urine Microscopic [[id number]]  (Abnormal) Collected: 02/06/25 1014    Lab Status: Final result Specimen: Urine, Clean Catch Updated: 02/06/25 1053     RBC, UA 10-20 /hpf      WBC, UA 10-20 /hpf      Epithelial Cells Occasional /hpf      Bacteria, UA Occasional /hpf      MUCUS THREADS Occasional     Hyaline Casts, UA 3-5 /lpf      Ca Oxalate Simona, UA Occasional /hpf     Urine culture [[id number]] Collected: 02/06/25 1014    Lab Status: In process Specimen: Urine, Clean Catch Updated: 02/06/25 1053    UA w Reflex to Microscopic w Reflex to Culture [[id number]]  (Abnormal) Collected: 02/06/25 1014    Lab Status: Final result Specimen: Urine, Clean Catch Updated: 02/06/25 1025     Color, UA Yellow     Clarity, UA Turbid     Specific Gravity, UA 1.027     pH, UA 5.5     Leukocytes, UA Large     Nitrite, UA Negative     Protein, UA Trace mg/dl      Glucose, UA Negative mg/dl      Ketones, UA Negative mg/dl      Urobilinogen, UA <2.0 mg/dl      Bilirubin, UA Negative     Occult Blood, UA Negative    HS Troponin I 2hr [[id number]]  (Normal) Collected: 02/06/25 0854    Lab Status: Final result Specimen: Blood from Line, Venous Updated: 02/06/25 0929     hs TnI 2hr 3 ng/L      Delta 2hr hsTnI 0 ng/L     TSH, 3rd generation with Free T4 reflex [[id number]]  (Normal) Collected: 02/06/25 0702    Lab Status: Final result Specimen: Blood from Arm, Right Updated: 02/06/25 0822     TSH 3RD GENERATON 1.660 uIU/mL     Comprehensive metabolic panel [[id number]] Collected: 02/06/25 0702    Lab Status: Final result Specimen: Blood from Arm, Right Updated: 02/06/25  0734     Sodium 139 mmol/L      Potassium 3.5 mmol/L      Chloride 104 mmol/L      CO2 26 mmol/L      ANION GAP 9 mmol/L      BUN 16 mg/dL      Creatinine 1.13 mg/dL      Glucose 109 mg/dL      Calcium 9.8 mg/dL      AST 15 U/L      ALT 12 U/L      Alkaline Phosphatase 100 U/L      Total Protein 7.6 g/dL      Albumin 4.3 g/dL      Total Bilirubin 0.82 mg/dL      eGFR 50 ml/min/1.73sq m     Narrative:      National Kidney Disease Foundation guidelines for Chronic Kidney Disease (CKD):     Stage 1 with normal or high GFR (GFR > 90 mL/min/1.73 square meters)    Stage 2 Mild CKD (GFR = 60-89 mL/min/1.73 square meters)    Stage 3A Moderate CKD (GFR = 45-59 mL/min/1.73 square meters)    Stage 3B Moderate CKD (GFR = 30-44 mL/min/1.73 square meters)    Stage 4 Severe CKD (GFR = 15-29 mL/min/1.73 square meters)    Stage 5 End Stage CKD (GFR <15 mL/min/1.73 square meters)  Note: GFR calculation is accurate only with a steady state creatinine    HS Troponin 0hr (reflex protocol) [[id number]]  (Normal) Collected: 02/06/25 0702    Lab Status: Final result Specimen: Blood from Arm, Right Updated: 02/06/25 0732     hs TnI 0hr 3 ng/L     CBC and differential [[id number]]  (Abnormal) Collected: 02/06/25 0702    Lab Status: Final result Specimen: Blood from Arm, Right Updated: 02/06/25 0722     WBC 8.10 Thousand/uL      RBC 4.32 Million/uL      Hemoglobin 13.8 g/dL      Hematocrit 42.9 %      MCV 99 fL      MCH 31.9 pg      MCHC 32.2 g/dL      RDW 12.5 %      MPV 10.4 fL      Platelets 332 Thousands/uL      nRBC 0 /100 WBCs      Segmented % 68 %      Immature Grans % 0 %      Lymphocytes % 25 %      Monocytes % 4 %      Eosinophils Relative 2 %      Basophils Relative 1 %      Absolute Neutrophils 5.54 Thousands/µL      Absolute Immature Grans 0.03 Thousand/uL      Absolute Lymphocytes 2.00 Thousands/µL      Absolute Monocytes 0.36 Thousand/µL      Eosinophils Absolute 0.13 Thousand/µL      Basophils Absolute 0.04 Thousands/µL      Fingerstick Glucose (POCT) [[id number]]  (Normal) Collected: 02/06/25 0653    Lab Status: Final result Specimen: Blood Updated: 02/06/25 0654     POC Glucose 106 mg/dl             XR chest 1 view   Final Interpretation by Carlos Flores MD (02/06 0920)      Hypoventilation. No acute disease            Workstation performed: [id number]         XR chest 1 view portable   Final Interpretation by Carlos Flores MD (02/06 0920)      Hypoventilation. No acute disease            Workstation performed: [id number]             ECG 12 Lead Documentation Only    Date/Time: 2/6/2025 9:40 AM    Performed by: Corinne Hemphill PA-C  Authorized by: Corinne Hemphill PA-C    Indications / Diagnosis:  Syncope  ECG reviewed by me, the ED Provider: yes    Patient location:  ED  Previous ECG:     Previous ECG:  Compared to current    Similarity:  No change  Interpretation:     Interpretation: normal    Rate:     ECG rate:  62    ECG rate assessment: normal    Rhythm:     Rhythm: sinus rhythm    Ectopy:     Ectopy: PVCs      PVCs:  Infrequent  QRS:     QRS axis:  Normal  Conduction:     Conduction: normal    ST segments:     ST segments:  Normal  T waves:     T waves: normal        ED Medication and Procedure Management   Prior to Admission Medications   Prescriptions Last Dose Informant Patient Reported? Taking?   Diclofenac Sodium (VOLTAREN) 1 %   No No   Sig: Apply 2 g topically 2 (two) times a day for 15 days   acetaminophen (TYLENOL) 500 mg tablet   Yes No   Sig: Take 500 mg by mouth every 6 (six) hours as needed for mild pain   albuterol (ProAir HFA) 90 mcg/act inhaler   No No   Sig: Inhale 2 puffs every 6 (six) hours as needed for wheezing   ergocalciferol (VITAMIN D2) 50,000 units   No No   Sig: Take 1 capsule (50,000 Units total) by mouth once a week   meclizine (ANTIVERT) 25 mg tablet   No No   Sig: Take 1 tablet (25 mg total) by mouth 3 (three) times a day as needed for dizziness for up to 15 days   ofloxacin (OCUFLOX)  0.3 % ophthalmic solution   No No   Sig: Administer 1 drop into the left eye 4 (four) times a day   valsartan-hydrochlorothiazide (DIOVAN-HCT) 80-12.5 MG per tablet   No No   Sig: Take 1 tablet by mouth daily   vitamin B-12 (CYANOCOBALAMIN) 250 MCG TABS   No No   Sig: Take 1 tablet (250 mcg total) by mouth daily      Facility-Administered Medications: None     Discharge Medication List as of 2/6/2025 11:12 AM        START taking these medications    Details   cephalexin (Keflex) 500 mg capsule Take 1 capsule (500 mg total) by mouth every 12 (twelve) hours for 7 days, Starting Thu 2/6/2025, Until Thu 2/13/2025, Normal           CONTINUE these medications which have NOT CHANGED    Details   acetaminophen (TYLENOL) 500 mg tablet Take 500 mg by mouth every 6 (six) hours as needed for mild pain, Historical Med      albuterol (ProAir HFA) 90 mcg/act inhaler Inhale 2 puffs every 6 (six) hours as needed for wheezing, Starting Wed 10/2/2024, Normal      Diclofenac Sodium (VOLTAREN) 1 % Apply 2 g topically 2 (two) times a day for 15 days, Starting Tue 6/4/2024, Until Wed 10/2/2024, Normal      ergocalciferol (VITAMIN D2) 50,000 units Take 1 capsule (50,000 Units total) by mouth once a week, Starting Mon 6/24/2024, Until Sat 12/21/2024, Normal      meclizine (ANTIVERT) 25 mg tablet Take 1 tablet (25 mg total) by mouth 3 (three) times a day as needed for dizziness for up to 15 days, Starting Sun 11/24/2024, Until Mon 12/9/2024 at 2359, Normal      ofloxacin (OCUFLOX) 0.3 % ophthalmic solution Administer 1 drop into the left eye 4 (four) times a day, Starting Mon 11/11/2024, Normal      valsartan-hydrochlorothiazide (DIOVAN-HCT) 80-12.5 MG per tablet Take 1 tablet by mouth daily, Starting Tue 12/24/2024, Normal      vitamin B-12 (CYANOCOBALAMIN) 250 MCG TABS Take 1 tablet (250 mcg total) by mouth daily, Starting Thu 8/29/2024, Until Tue 2/25/2025, Normal             ED SEPSIS DOCUMENTATION   Time reflects when diagnosis was  documented in both MDM as applicable and the Disposition within this note       Time User Action Codes Description Comment    2/6/2025 11:09 AM Corinne Hemphill [R55] Syncope     2/6/2025 11:09 AM Corinne Hemphill [N39.0] UTI (urinary tract infection)                  Corinne Hemphill PA-C  02/06/25 1149

## 2025-02-07 LAB
ATRIAL RATE: 62 BPM
P AXIS: 60 DEGREES
PR INTERVAL: 176 MS
QRS AXIS: 15 DEGREES
QRSD INTERVAL: 92 MS
QT INTERVAL: 404 MS
QTC INTERVAL: 410 MS
T WAVE AXIS: -5 DEGREES
VENTRICULAR RATE: 62 BPM

## 2025-02-07 PROCEDURE — 93010 ELECTROCARDIOGRAM REPORT: CPT | Performed by: INTERNAL MEDICINE

## 2025-02-08 LAB — BACTERIA UR CULT: NORMAL

## 2025-02-13 ENCOUNTER — TELEPHONE (OUTPATIENT)
Age: 70
End: 2025-02-13

## 2025-02-13 ENCOUNTER — OFFICE VISIT (OUTPATIENT)
Dept: FAMILY MEDICINE CLINIC | Facility: CLINIC | Age: 70
End: 2025-02-13
Payer: COMMERCIAL

## 2025-02-13 VITALS
HEART RATE: 93 BPM | DIASTOLIC BLOOD PRESSURE: 74 MMHG | HEIGHT: 63 IN | SYSTOLIC BLOOD PRESSURE: 126 MMHG | BODY MASS INDEX: 33.31 KG/M2 | WEIGHT: 188 LBS | OXYGEN SATURATION: 95 % | TEMPERATURE: 98.1 F

## 2025-02-13 DIAGNOSIS — I10 ESSENTIAL HYPERTENSION: ICD-10-CM

## 2025-02-13 DIAGNOSIS — H81.10 BENIGN PAROXYSMAL POSITIONAL VERTIGO, UNSPECIFIED LATERALITY: ICD-10-CM

## 2025-02-13 DIAGNOSIS — R55 SYNCOPE, UNSPECIFIED SYNCOPE TYPE: Primary | ICD-10-CM

## 2025-02-13 PROBLEM — E66.01 OBESITY, MORBID (HCC): Status: RESOLVED | Noted: 2024-08-29 | Resolved: 2025-02-13

## 2025-02-13 PROCEDURE — 99214 OFFICE O/P EST MOD 30 MIN: CPT | Performed by: PHYSICIAN ASSISTANT

## 2025-02-13 NOTE — PROGRESS NOTES
Name: Yasmeen Castle      : 1955      MRN: [id number]  Encounter Provider: Geovanni Perez PA-C  Encounter Date: 2025   Encounter department: [facility]    Assessment & Plan  Syncope, unspecified syncope type  Suspect may have been vasovagal  Recommend echo/holter to evaluate cardiogenic origin  Labs, urine studies, EKG, CXR reviewed  No urinary sx, completing antibiotic  Follow up prn. ER/return precautions provided   Orders:  •  Echo complete w/ contrast if indicated; Future    Benign paroxysmal positional vertigo, unspecified laterality  Refer to PT  Orders:  •  Ambulatory Referral to Physical Therapy; Future    Essential hypertension  At goal             History of Present Illness     Pt with hx of vertigo, unilateral SNHL presents for ER follow up. Was at work and syncopized while standing. Labs, CXR done in ER. Recent MRI/CT head reviewed, was not repeated in ER. No recurrence of sx. Was treated for a UTI as well. Since Er no chest pain, syncope, lightheadedness, palpitations, N/V,  sx.       Review of Systems   Constitutional:  Negative for chills, fatigue and fever.   HENT:  Negative for congestion, ear pain, hearing loss, nosebleeds, postnasal drip, rhinorrhea, sinus pressure, sinus pain, sneezing and sore throat.    Eyes:  Negative for pain, discharge, itching and visual disturbance.   Respiratory:  Negative for cough, chest tightness, shortness of breath and wheezing.    Cardiovascular:  Negative for chest pain, palpitations and leg swelling.   Gastrointestinal:  Negative for abdominal pain, blood in stool, constipation, diarrhea, nausea and vomiting.   Genitourinary:  Negative for frequency and urgency.   Neurological:  Positive for dizziness and syncope. Negative for light-headedness and numbness.     Past Medical History:   Diagnosis Date   • Bunion, right 2022   • Chronic GERD 2018   • Ear problems     occassiobnal pain   • Hypertension    • Nasal  congestion    • Tinnitus    • Trochanteric bursitis 10/21/2019     Past Surgical History:   Procedure Laterality Date   • GASTRECTOMY SLEEVE LAPAROSCOPIC     • AK ARTHRODESIS GREAT TOE METATARSOPHALANGEAL JOINT Right 7/22/2022    Procedure: ARTHRODESIS / FUSION FOOT;  Surgeon: Yan Hayden DPM;  Location: [facility] OR;  Service: Podiatry   • UTERINE FIBROID SURGERY       Family History   Problem Relation Age of Onset   • Hypertension Mother    • Diabetes Mother    • No Known Problems Father    • No Known Problems Maternal Grandmother    • No Known Problems Maternal Grandfather    • No Known Problems Paternal Grandmother    • No Known Problems Paternal Grandfather    • No Known Problems Maternal Aunt    • No Known Problems Maternal Aunt    • No Known Problems Paternal Aunt    • No Known Problems Paternal Aunt    • No Known Problems Paternal Uncle    • No Known Problems Paternal Uncle    • No Known Problems Half-Sister    • No Known Problems Half-Sister      Social History     Tobacco Use   • Smoking status: Never   • Smokeless tobacco: Never   Vaping Use   • Vaping status: Never Used   Substance and Sexual Activity   • Alcohol use: No   • Drug use: No   • Sexual activity: Not on file     Current Outpatient Medications on File Prior to Visit   Medication Sig   • acetaminophen (TYLENOL) 500 mg tablet Take 500 mg by mouth every 6 (six) hours as needed for mild pain   • albuterol (ProAir HFA) 90 mcg/act inhaler Inhale 2 puffs every 6 (six) hours as needed for wheezing   • cephalexin (Keflex) 500 mg capsule Take 1 capsule (500 mg total) by mouth every 12 (twelve) hours for 7 days   • Diclofenac Sodium (VOLTAREN) 1 % Apply 2 g topically 2 (two) times a day for 15 days   • ergocalciferol (VITAMIN D2) 50,000 units Take 1 capsule (50,000 Units total) by mouth once a week   • meclizine (ANTIVERT) 25 mg tablet Take 1 tablet (25 mg total) by mouth 3 (three) times a day as needed for dizziness for up to 15 days   •  "ofloxacin (OCUFLOX) 0.3 % ophthalmic solution Administer 1 drop into the left eye 4 (four) times a day   • valsartan-hydrochlorothiazide (DIOVAN-HCT) 80-12.5 MG per tablet Take 1 tablet by mouth daily   • vitamin B-12 (CYANOCOBALAMIN) 250 MCG TABS Take 1 tablet (250 mcg total) by mouth daily     Allergies   Allergen Reactions   • Aspirin Diarrhea and Vomiting     Immunization History   Administered Date(s) Administered   • COVID-19 PFIZER VACCINE 0.3 ML IM 12/21/2020, 01/10/2021, 12/28/2021   • INFLUENZA 10/17/2019, 12/08/2022   • Influenza, high dose seasonal 0.7 mL 11/15/2023   • Tdap 10/22/2024   • influenza, trivalent, adjuvanted 10/22/2024     Objective   /74   Pulse 93   Temp 98.1 °F (36.7 °C)   Ht 5' 3\" (1.6 m)   Wt 85.3 kg (188 lb)   SpO2 95%   BMI 33.30 kg/m²     Physical Exam  Vitals and nursing note reviewed.   Constitutional:       General: She is not in acute distress.     Appearance: She is well-developed.   HENT:      Head: Normocephalic and atraumatic.   Eyes:      Conjunctiva/sclera: Conjunctivae normal.   Cardiovascular:      Rate and Rhythm: Normal rate and regular rhythm.      Heart sounds: Murmur (subtle sytolic murmur USB) heard.   Pulmonary:      Effort: Pulmonary effort is normal. No respiratory distress.      Breath sounds: Normal breath sounds. No wheezing, rhonchi or rales.   Musculoskeletal:         General: No swelling.      Cervical back: Neck supple.   Skin:     General: Skin is warm and dry.      Capillary Refill: Capillary refill takes less than 2 seconds.   Neurological:      Mental Status: She is alert.   Psychiatric:         Mood and Affect: Mood normal.         "

## 2025-02-13 NOTE — TELEPHONE ENCOUNTER
Patient called stating she wanted to speak with Liseth Townsend. She had an appt today.     Would not say what the message was stating it is personal/private

## 2025-02-17 ENCOUNTER — TELEPHONE (OUTPATIENT)
Dept: FAMILY MEDICINE CLINIC | Facility: CLINIC | Age: 70
End: 2025-02-17

## 2025-02-25 ENCOUNTER — OFFICE VISIT (OUTPATIENT)
Dept: FAMILY MEDICINE CLINIC | Facility: CLINIC | Age: 70
End: 2025-02-25
Payer: COMMERCIAL

## 2025-02-25 VITALS
SYSTOLIC BLOOD PRESSURE: 116 MMHG | DIASTOLIC BLOOD PRESSURE: 72 MMHG | WEIGHT: 187 LBS | OXYGEN SATURATION: 98 % | BODY MASS INDEX: 33.13 KG/M2 | HEART RATE: 89 BPM | HEIGHT: 63 IN | TEMPERATURE: 98.2 F

## 2025-02-25 DIAGNOSIS — I10 ESSENTIAL HYPERTENSION: ICD-10-CM

## 2025-02-25 DIAGNOSIS — R41.3 MEMORY LOSS, SHORT TERM: Primary | ICD-10-CM

## 2025-02-25 DIAGNOSIS — R42 DIZZINESS: ICD-10-CM

## 2025-02-25 PROCEDURE — 99214 OFFICE O/P EST MOD 30 MIN: CPT | Performed by: NURSE PRACTITIONER

## 2025-02-25 RX ORDER — PREDNISOLONE ACETATE 10 MG/ML
SUSPENSION/ DROPS OPHTHALMIC
COMMUNITY
Start: 2025-02-11

## 2025-02-25 NOTE — ASSESSMENT & PLAN NOTE
Patient recently had MRI of brain from 12/2024 and was normal findings   Orders:  •  Ambulatory Referral to Senior Care; Future

## 2025-02-25 NOTE — ASSESSMENT & PLAN NOTE
Patient has not taken her BP medication in two days and will hold the blood pressure medication Diovan-HCTZ her readings are normal int he office today

## 2025-02-25 NOTE — PROGRESS NOTES
"Name: Yasmeen Castle      : 1955      MRN: [id number]  Encounter Provider: ROVERTO Alexander  Encounter Date: 2025   Encounter department: [facility]  :  Assessment & Plan  Dizziness  Patient recently had MRI of brain from 2024 and was normal findings   Orders:  •  Ambulatory Referral to Senior Care; Future    Memory loss, short term    Orders:  •  Ambulatory Referral to Senior Care; Future    Essential hypertension  Patient has not taken her BP medication in two days and will hold the blood pressure medication Diovan-HCTZ her readings are normal int he office today       Discussed with patient and significant other about her memory loss and MOCA  IH holter montior was normal will refer to senior care to evaluate        History of Present Illness   Patient here today for follow up and reports that her significant other is here with her who has noticed a significant changes in her memory and has been more forgetful and her personality has bene changing and more child like demeanor. 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 She has noticed that she is having some hand shaking.   Patient had a syncopal episode the beginning of 2025 at work where she was at the end of her shift and then passed out at work   Syncope       Pt has not been feeling well for the past couple days, had a syncopal episode towards the end of her shift where she slid down the wall accompanied by a co worker. Was hypotensive & bradycardic for staff. Patient states she feels \"off\" and has  a hard time staying awake She went to the ED and had imaging and labs and had followed up here in the office and Holter monitor was completed and was normal She had previously had an MRI of the brain in 2024 and was normal other than some sinus congestion.           Review of Systems   Constitutional:  Negative for activity change, appetite change, chills, " "diaphoresis, fatigue, fever and unexpected weight change.   HENT:  Negative for congestion, ear pain, hearing loss, postnasal drip, sinus pressure, sinus pain, sneezing, sore throat, trouble swallowing and voice change.    Eyes:  Positive for visual disturbance. Negative for pain and redness.        Patient has upcoming cataract surgery scheduled for 3/3 and 3/11   Respiratory:  Negative for cough and shortness of breath.    Cardiovascular:  Negative for chest pain, palpitations and leg swelling.   Gastrointestinal:  Negative for abdominal pain, constipation, diarrhea, nausea and vomiting.   Endocrine: Negative.    Genitourinary: Negative.    Musculoskeletal:  Negative for arthralgias.   Skin: Negative.    Allergic/Immunologic: Negative.    Neurological:  Positive for syncope and headaches. Negative for dizziness, tremors, seizures, facial asymmetry, speech difficulty, weakness, light-headedness and numbness.        Happened at work she passed out at work currently working per [employer]    Hematological: Negative.    Psychiatric/Behavioral:  Positive for suicidal ideas. Negative for agitation, behavioral problems, confusion, decreased concentration, dysphoric mood, hallucinations and self-injury. The patient is not nervous/anxious and is not hyperactive.        Objective   /72 (BP Location: Left arm, Patient Position: Sitting, Cuff Size: Large)   Pulse 89   Temp 98.2 °F (36.8 °C)   Ht 5' 3\" (1.6 m)   Wt 84.8 kg (187 lb)   SpO2 98%   BMI 33.13 kg/m²      Physical Exam  Constitutional:       General: She is not in acute distress.     Appearance: She is well-developed.   HENT:      Head: Normocephalic and atraumatic.      Right Ear: Tympanic membrane normal.      Left Ear: Tympanic membrane normal.      Nose: Nose normal.      Mouth/Throat:      Mouth: Mucous membranes are moist.   Eyes:      Pupils: Pupils are equal, round, and reactive to light.   Neck:      Thyroid: No thyromegaly.   Cardiovascular:      Rate " and Rhythm: Normal rate and regular rhythm.      Heart sounds: Normal heart sounds. No murmur heard.  Pulmonary:      Effort: Pulmonary effort is normal. No respiratory distress.      Breath sounds: Normal breath sounds. No wheezing.   Abdominal:      General: Bowel sounds are normal.      Palpations: Abdomen is soft.   Musculoskeletal:         General: Normal range of motion.      Cervical back: Normal range of motion.   Skin:     General: Skin is warm and dry.      Capillary Refill: Capillary refill takes less than 2 seconds.   Neurological:      General: No focal deficit present.      Mental Status: She is alert and oriented to person, place, and time.   Psychiatric:         Attention and Perception: Attention normal.         Mood and Affect: Mood normal.         Speech: Speech normal.         Behavior: Behavior normal. Behavior is cooperative.         Thought Content: Thought content normal.         Cognition and Memory: Cognition and memory normal.         Judgment: Judgment normal.

## 2025-03-17 ENCOUNTER — EVALUATION (OUTPATIENT)
Dept: PHYSICAL THERAPY | Facility: CLINIC | Age: 70
End: 2025-03-17
Payer: COMMERCIAL

## 2025-03-17 ENCOUNTER — HOSPITAL ENCOUNTER (OUTPATIENT)
Dept: NON INVASIVE DIAGNOSTICS | Facility: HOSPITAL | Age: 70
Discharge: HOME | End: 2025-03-17
Payer: COMMERCIAL

## 2025-03-17 VITALS
SYSTOLIC BLOOD PRESSURE: 116 MMHG | WEIGHT: 187 LBS | HEIGHT: 63 IN | DIASTOLIC BLOOD PRESSURE: 72 MMHG | HEART RATE: 82 BPM | BODY MASS INDEX: 33.13 KG/M2

## 2025-03-17 DIAGNOSIS — H81.10 BENIGN PAROXYSMAL POSITIONAL VERTIGO, UNSPECIFIED LATERALITY: Primary | ICD-10-CM

## 2025-03-17 DIAGNOSIS — R55 SYNCOPE, UNSPECIFIED SYNCOPE TYPE: ICD-10-CM

## 2025-03-17 LAB
AORTIC ROOT: 2.8 CM
AORTIC VALVE MEAN VELOCITY: 10.3 M/S
AV LVOT MEAN GRADIENT: 2 MMHG
AV LVOT PEAK GRADIENT: 4 MMHG
AV MEAN PRESS GRAD SYS DOP V1V2: 5 MMHG
AV PEAK GRADIENT: 10 MMHG
AV VELOCITY RATIO: 0.73
AV VMAX SYS DOP: 1.59 M/S
BSA FOR ECHO PROCEDURE: 1.88 M2
DOP CALC AO VTI: 34.73 CM
DOP CALC LVOT PEAK VEL VTI: 25.26 CM
DOP CALC LVOT PEAK VEL: 1.06 M/S
E WAVE DECELERATION TIME: 190 MS
E/A RATIO: 1.2
FRACTIONAL SHORTENING: 33 (ref 28–44)
INTERVENTRICULAR SEPTUM IN DIASTOLE (PARASTERNAL SHORT AXIS VIEW): 1 CM
INTERVENTRICULAR SEPTUM: 1 CM (ref 0.6–1.1)
LAAS-AP2: 20.5 CM2
LAAS-AP4: 23.9 CM2
LEFT ATRIUM SIZE: 3.8 CM
LEFT ATRIUM VOLUME (MOD BIPLANE): 70 ML
LEFT ATRIUM VOLUME INDEX (MOD BIPLANE): 37.2 ML/M2
LEFT INTERNAL DIMENSION IN SYSTOLE: 3 CM (ref 2.1–4)
LEFT VENTRICULAR INTERNAL DIMENSION IN DIASTOLE: 4.5 CM (ref 3.5–6)
LEFT VENTRICULAR POSTERIOR WALL IN END DIASTOLE: 1 CM
LEFT VENTRICULAR STROKE VOLUME: 59 ML
LV EF US.2D.A4C+ESTIMATED: 60 %
LVSV (TEICH): 59 ML
MV E'TISSUE VEL-LAT: 10 CM/S
MV E'TISSUE VEL-SEP: 9 CM/S
MV PEAK A VEL: 0.87 M/S
MV PEAK E VEL: 104 CM/S
MV STENOSIS PRESSURE HALF TIME: 55 MS
MV VALVE AREA P 1/2 METHOD: 4
PV PEAK GRADIENT: 19 MMHG
RA PRESSURE ESTIMATED: 15 MMHG
RIGHT ATRIUM AREA SYSTOLE A4C: 18.2 CM2
RIGHT VENTRICLE ID DIMENSION: 3.9 CM
RV PSP: 44 MMHG
SL CV LEFT ATRIUM LENGTH A2C: 5.6 CM
SL CV LV EF: 55
SL CV PED ECHO LEFT VENTRICLE DIASTOLIC VOLUME (MOD BIPLANE) 2D: 93 ML
SL CV PED ECHO LEFT VENTRICLE SYSTOLIC VOLUME (MOD BIPLANE) 2D: 35 ML
TR MAX PG: 29 MMHG
TR PEAK VELOCITY: 2.7 M/S
TRICUSPID ANNULAR PLANE SYSTOLIC EXCURSION: 1.7 CM
TRICUSPID VALVE PEAK REGURGITATION VELOCITY: 2.68 M/S

## 2025-03-17 PROCEDURE — 97161 PT EVAL LOW COMPLEX 20 MIN: CPT | Performed by: PHYSICAL THERAPIST

## 2025-03-17 PROCEDURE — 93306 TTE W/DOPPLER COMPLETE: CPT | Performed by: INTERNAL MEDICINE

## 2025-03-17 PROCEDURE — 93306 TTE W/DOPPLER COMPLETE: CPT

## 2025-03-17 PROCEDURE — 95992 CANALITH REPOSITIONING PROC: CPT | Performed by: PHYSICAL THERAPIST

## 2025-03-17 NOTE — PROGRESS NOTES
PT Evaluation     Today's date: 3/17/2025  Patient name: Yasmeen Castle  : 1955  MRN: [id number]  Referring provider: Geovanni Perez PA-C  Dx:   Encounter Diagnosis     ICD-10-CM    1. Benign paroxysmal positional vertigo, unspecified laterality  H81.10           Start Time: 1200  Stop Time: 1255  Total time in clinic (min): 55 minutes    Assessment    Assessment details: Yasmeen Castle is a 70 y.o. female who presents with insidious onset of spinning dizziness produced when transitioning from supine to sitting and rolling in bed. Patient has fallen 2 times and feels unsteady when she is dizzy, but otherwise is not unsteady. Examination findings demonstrate negative VOMS and cervical screen. Left Ecru Hallpike positive for let posterior canalithiasis. Performed Epley and up retesting Patient was asymptomatic. Patient would benefit from skilled physical therapy to address their aforementioned impairments, improve their level of function and to improve their overall quality of life.  Understanding of Dx/Px/POC: excellent     Prognosis: excellent    Goals  Short Term Goals: to be achieved in 2 weeks  1) Patient to be independent with basic HEP.  2) Patient to report 50% reduction in intensity of dizziness.    Long Term Goals: to be achieved by discharge  1) Patient to be independent with comprehensive HEP.  2) Patient to report resolution of dizziness with all a/iadls.    Plan  Patient would benefit from: skilled physical therapy  Planned modality interventions: thermotherapy: hydrocollator packs and cryotherapy    Planned therapy interventions: abdominal trunk stabilization, activity modification, balance, balance/weight bearing training, behavior modification, body mechanics training, canalith repositioning, functional ROM exercises, gait training, home exercise program, transfer training, therapeutic exercise, therapeutic activities, stretching, strengthening, postural training, patient education,  "neuromuscular re-education, massage, manual therapy and joint mobilization    Duration in weeks: 6  Plan of Care beginning date: 3/17/2025  Plan of Care expiration date: 4/28/2025  Treatment plan discussed with: patient        Subjective Evaluation    History of Present Illness  Mechanism of injury: Patient reports that when she she lays down and gets up quickly the whole world starts spinning. Patient has dizziness when laying on her sides as well. Patient's dizziness typically lasts for several seconds. Patient's symptoms first started as pressure from the back of her head to the sides beginning in January. This has since subsided. Patient had been prescribed Meclizine, but this did not help and has since stopped taking it. Patient denies experiencing dysphagia, dysarthria, diplopia, tingling/numbness, and HA. Patient does have weakness in her right hand, which she notices when opening tight jars. Patient had fallen and hit her head earlier this year, which may have contributed to the onset of her dizziness. Patient denies experiencing concussion or osteoporosis. Patient underwent an echo earlier today, but has not received the results.     MRI of brain: \"No inner ear abnormality evident.     Mild  altered signal involving white matter discussed above is a nonspecific finding most often relating to chronic small vessel white matter ischemic disease; the degree of which typical for age.     Mild to moderate mucosal thickening alveolar recess of the right maxillary sinus.     Cervical spondylosis partially included and incompletely evaluated.\"  Patient Goals  Patient goal: \"to get better\"  Pain  No pain reported    Social Support    Employment status: working ([employer])      Objective     Active Range of Motion   Cervical/Thoracic Spine     Normal active range of motion    Tests   Cervical   Negative alar ligament test, Sharp-Jennifer test and VBI.     Ambulation     Ambulation: Level Surfaces   Ambulation without " assistive device: independent    Observational Gait   Gait: within functional limits     Functional Assessment        Comments  FGA: 30/30  Neuro Exam:     Oculomotor exam   Oculomotor ROM: WNL  Resting nystagmus: not present   Gaze holding nystagmus: present left and present right  Smooth pursuits: within normal limits  Vertical saccades: normal  Horizontal saccades: normal  Convergence: normal    Positional testing   Primrose-Hallpike   Left posterior canal: symptomatic, torsional and upbeating               POC expires Unit limit Auth  expiration date PT/OT + Visit Limit?   4/28/25 N/A N/A BOMN                 Visit/Unit Tracking  AUTH Status:  Date 3/17              N/A Used 1               Remaining                  Precautions: standard      Manuals 3/17            L Epley x2                                                   Neuro Re-Ed             Patient education: pathophysiology, differential diagnosis GR                                                                                          Ther Ex                                                                                                                     Ther Activity                                       Gait Training                                       Modalities

## 2025-03-18 ENCOUNTER — RESULTS FOLLOW-UP (OUTPATIENT)
Dept: FAMILY MEDICINE CLINIC | Facility: CLINIC | Age: 70
End: 2025-03-18

## 2025-03-21 ENCOUNTER — OFFICE VISIT (OUTPATIENT)
Dept: PHYSICAL THERAPY | Facility: CLINIC | Age: 70
End: 2025-03-21
Payer: COMMERCIAL

## 2025-03-21 DIAGNOSIS — H81.10 BENIGN PAROXYSMAL POSITIONAL VERTIGO, UNSPECIFIED LATERALITY: Primary | ICD-10-CM

## 2025-03-21 PROCEDURE — 97112 NEUROMUSCULAR REEDUCATION: CPT | Performed by: PHYSICAL THERAPIST

## 2025-03-21 NOTE — PROGRESS NOTES
Daily Note     Today's date: 3/21/2025  Patient name: Yasmeen Castle  : 1955  MRN: [id number]  Referring provider: Geovanni Perez PA-C  Dx:   Encounter Diagnosis     ICD-10-CM    1. Benign paroxysmal positional vertigo, unspecified laterality  H81.10           Start Time: 1105  Stop Time: 1120  Total time in clinic (min): 15 minutes    Subjective: Patient reports that she has not been dizzy since her last treatment session. Patient does have some tinnitus in her left ear, but this is chronic.      Objective: See treatment diary below      Assessment: Tolerated treatment well. Patient would benefit from continued PT. Positional testing for bilateral posterior canal and horizontal canals negative.       Plan: Continue per plan of care.  Progress treatment as tolerated.  Consider d/c if Patient remains asymptomatic.       POC expires Unit limit Auth  expiration date PT/OT + Visit Limit?   25 N/A N/A BOMN                 Visit/Unit Tracking  AUTH Status:  Date 3/17 3/21             N/A Used 1 2              Remaining                  Precautions: standard      Manuals 3/17 3/21           L Epley x2 Not needed           Positional testing  (-)                                     Neuro Re-Ed             Patient education: pathophysiology, differential diagnosis GR GR                                                                                         Ther Ex                                                                                                                     Ther Activity                                       Gait Training                                       Modalities

## 2025-03-24 ENCOUNTER — OFFICE VISIT (OUTPATIENT)
Dept: FAMILY MEDICINE CLINIC | Facility: CLINIC | Age: 70
End: 2025-03-24
Payer: MEDICARE

## 2025-03-24 ENCOUNTER — APPOINTMENT (OUTPATIENT)
Dept: LAB | Facility: CLINIC | Age: 70
End: 2025-03-24
Payer: MEDICARE

## 2025-03-24 VITALS
TEMPERATURE: 97.9 F | OXYGEN SATURATION: 100 % | BODY MASS INDEX: 32.6 KG/M2 | HEART RATE: 71 BPM | HEIGHT: 63 IN | DIASTOLIC BLOOD PRESSURE: 82 MMHG | SYSTOLIC BLOOD PRESSURE: 126 MMHG | WEIGHT: 184 LBS

## 2025-03-24 DIAGNOSIS — Z13.1 SCREENING FOR DIABETES MELLITUS: ICD-10-CM

## 2025-03-24 DIAGNOSIS — Z13.220 SCREENING FOR LIPID DISORDERS: ICD-10-CM

## 2025-03-24 DIAGNOSIS — Z00.00 INITIAL MEDICARE ANNUAL WELLNESS VISIT: Primary | ICD-10-CM

## 2025-03-24 LAB
CHOLEST SERPL-MCNC: 201 MG/DL (ref ?–200)
EST. AVERAGE GLUCOSE BLD GHB EST-MCNC: 94 MG/DL
HBA1C MFR BLD: 4.9 %
HDLC SERPL-MCNC: 67 MG/DL
LDLC SERPL CALC-MCNC: 108 MG/DL (ref 0–100)
TRIGL SERPL-MCNC: 132 MG/DL (ref ?–150)

## 2025-03-24 PROCEDURE — G0402 INITIAL PREVENTIVE EXAM: HCPCS | Performed by: NURSE PRACTITIONER

## 2025-03-24 PROCEDURE — 83036 HEMOGLOBIN GLYCOSYLATED A1C: CPT

## 2025-03-24 PROCEDURE — 36415 COLL VENOUS BLD VENIPUNCTURE: CPT

## 2025-03-24 PROCEDURE — 80061 LIPID PANEL: CPT

## 2025-03-24 NOTE — PROGRESS NOTES
Name: Yasmeen Castle      : 1955      MRN: [id number]  Encounter Provider: ROVERTO Alexander  Encounter Date: 3/24/2025   Encounter department: [facility]    Assessment & Plan  Initial Medicare annual wellness visit         Screening for lipid disorders    Orders:    Lipid Panel with Direct LDL reflex; Future    Screening for diabetes mellitus    Orders:    Hemoglobin A1C; Future      Depression Screening and Follow-up Plan: Patient was screened for depression during today's encounter. They screened negative with a PHQ-2 score of 0.      Falls Plan of Care: balance, strength, and gait training instructions were provided.       Preventive health issues were discussed with patient, and age appropriate screening tests were ordered as noted in patient's After Visit Summary. Personalized health advice and appropriate referrals for health education or preventive services given if needed, as noted in patient's After Visit Summary.    History of Present Illness     Patient here today for the welcome to medicare visit        Patient Care Team:  ROVERTO Alexander as PCP - General (Family Medicine)  Claudia Landry DO as PCP - PCP-[facility] Attributed-[place]    Review of Systems   Constitutional:  Negative for activity change, appetite change, chills, diaphoresis, fatigue, fever and unexpected weight change.   HENT:  Negative for congestion, ear pain, hearing loss, postnasal drip, sinus pressure, sinus pain, sneezing and sore throat.    Eyes:  Negative for pain, redness and visual disturbance.   Respiratory:  Negative for cough and shortness of breath.    Cardiovascular:  Negative for chest pain and leg swelling.   Gastrointestinal:  Negative for abdominal pain, diarrhea, nausea and vomiting.   Musculoskeletal:  Negative for arthralgias.   Neurological:  Negative for dizziness and light-headedness.   Psychiatric/Behavioral:  Negative for behavioral problems and  dysphoric mood.      Medical History Reviewed by provider this encounter:       Annual Wellness Visit Questionnaire   Yasmeen is here for her Welcome to Medicare visit.     Health Risk Assessment:   Patient rates overall health as very good. Patient feels that their physical health rating is much better. Patient is very satisfied with their life. Eyesight was rated as slightly worse. Hearing was rated as same. Patient feels that their emotional and mental health rating is same. Patients states they are sometimes angry. Patient states they are sometimes unusually tired/fatigued. Pain experienced in the last 7 days has been none. Patient states that she has experienced no weight loss or gain in last 6 months.     Depression Screening:   PHQ-2 Score: 0      Fall Risk Screening:   In the past year, patient has experienced: no history of falling in past year      Urinary Incontinence Screening:   Patient has not leaked urine accidently in the last six months.     Home Safety:  Patient does not have trouble with stairs inside or outside of their home. Patient has working smoke alarms and has working carbon monoxide detector.     Nutrition:   Current diet is Regular.     Medications:   Patient is currently taking over-the-counter supplements. OTC medications include: see medication list. Patient is able to manage medications.     Activities of Daily Living (ADLs)/Instrumental Activities of Daily Living (IADLs):   Walk and transfer into and out of bed and chair?: Yes  Dress and groom yourself?: Yes    Bathe or shower yourself?: Yes    Feed yourself? Yes  Do your laundry/housekeeping?: Yes  Manage your money, pay your bills and track your expenses?: Yes  Make your own meals?: Yes    Do your own shopping?: Yes    Previous Hospitalizations:   Any hospitalizations or ED visits within the last 12 months?: Yes    How many hospitalizations have you had in the last year?: 1-2    Advance Care Planning:   Living will: No    Durable  POA for healthcare: No    Advanced directive: No    Advanced directive counseling given: Yes    ACP document given: Yes    End of Life Decisions reviewed with patient: Yes    Provider agrees with end of life decisions: Yes      Cognitive Screening:   Provider or family/friend/caregiver concerned regarding cognition?: No    PREVENTIVE SCREENINGS      Cardiovascular Screening:    General: Screening Not Indicated, History Lipid Disorder, Risks and Benefits Discussed and Screening Current    Due for: Lipid Panel      Diabetes Screening:     General: Screening Current and Risks and Benefits Discussed      Colorectal Cancer Screening:     General: Screening Current      Breast Cancer Screening:     General: Risks and Benefits Discussed and Screening Current      Cervical Cancer Screening:    General: Screening Not Indicated and Risks and Benefits Discussed      Osteoporosis Screening:    General: Risks and Benefits Discussed and Screening Current      Abdominal Aortic Aneurysm (AAA) Screening:        General: Risks and Benefits Discussed and Screening Not Indicated      Lung Cancer Screening:     General: Screening Not Indicated and Risks and Benefits Discussed      Hepatitis C Screening:    General: Screening Current and Risks and Benefits Discussed    Screening, Brief Intervention, and Referral to Treatment (SBIRT)     Screening  Typical number of drinks in a day: 0  Typical number of drinks in a week: 0  Interpretation: Low risk drinking behavior.    Social Drivers of Health     Food Insecurity: Patient Declined (3/24/2025)    Hunger Vital Sign     Worried About Running Out of Food in the Last Year: Patient declined     Ran Out of Food in the Last Year: Patient declined   Transportation Needs: Patient Declined (3/24/2025)    PRAPARE - Transportation     Lack of Transportation (Medical): Patient declined     Lack of Transportation (Non-Medical): Patient declined   Housing Stability: Patient Declined (3/24/2025)     "Housing Stability Vital Sign     Unable to Pay for Housing in the Last Year: Patient declined     Number of Times Moved in the Last Year: 1     Homeless in the Last Year: Patient declined   Utilities: Patient Declined (3/24/2025)    [facility] Utilities     Threatened with loss of utilities: Patient declined     Vision Screening    Right eye Left eye Both eyes   Without correction 20/25 20/30 20/25   With correction      Comments: Color Normal     Objective   /82 (BP Location: Left arm, Patient Position: Sitting, Cuff Size: Large)   Pulse 71   Temp 97.9 °F (36.6 °C)   Ht 5' 3\" (1.6 m)   Wt 83.5 kg (184 lb)   SpO2 100%   BMI 32.59 kg/m²     Physical Exam  Constitutional:       General: She is not in acute distress.     Appearance: She is well-developed.   HENT:      Head: Normocephalic and atraumatic.   Eyes:      Pupils: Pupils are equal, round, and reactive to light.   Neck:      Thyroid: No thyromegaly.   Cardiovascular:      Rate and Rhythm: Normal rate and regular rhythm.      Heart sounds: Normal heart sounds. No murmur heard.  Pulmonary:      Effort: Pulmonary effort is normal. No respiratory distress.      Breath sounds: Normal breath sounds. No wheezing.   Abdominal:      General: Bowel sounds are normal.      Palpations: Abdomen is soft.   Musculoskeletal:         General: Normal range of motion.      Cervical back: Normal range of motion.   Skin:     General: Skin is warm and dry.   Neurological:      Mental Status: She is alert and oriented to person, place, and time.         "

## 2025-03-24 NOTE — PATIENT INSTRUCTIONS
Medicare Preventive Visit Patient Instructions  Thank you for completing your Welcome to Medicare Visit or Medicare Annual Wellness Visit today. Your next wellness visit will be due in one year (3/25/2026).  The screening/preventive services that you may require over the next 5-10 years are detailed below. Some tests may not apply to you based off risk factors and/or age. Screening tests ordered at today's visit but not completed yet may show as past due. Also, please note that scanned in results may not display below.  Preventive Screenings:  Service Recommendations Previous Testing/Comments   Colorectal Cancer Screening  * Colonoscopy    * Fecal Occult Blood Test (FOBT)/Fecal Immunochemical Test (FIT)  * Fecal DNA/Cologuard Test  * Flexible Sigmoidoscopy Age: 45-75 years old   Colonoscopy: every 10 years (may be performed more frequently if at higher risk)  OR  FOBT/FIT: every 1 year  OR  Cologuard: every 3 years  OR  Sigmoidoscopy: every 5 years  Screening may be recommended earlier than age 45 if at higher risk for colorectal cancer. Also, an individualized decision between you and your healthcare provider will decide whether screening between the ages of 76-85 would be appropriate. Colonoscopy: 04/05/2022  FOBT/FIT: Not on file  Cologuard: Not on file  Sigmoidoscopy: Not on file    Screening Current     Breast Cancer Screening Age: 40+ years old  Frequency: every 1-2 years  Not required if history of left and right mastectomy Mammogram: 06/13/2022        Cervical Cancer Screening Between the ages of 21-29, pap smear recommended once every 3 years.   Between the ages of 30-65, can perform pap smear with HPV co-testing every 5 years.   Recommendations may differ for women with a history of total hysterectomy, cervical cancer, or abnormal pap smears in past. Pap Smear: 07/22/2024    Screening Not Indicated   Hepatitis C Screening Once for adults born between 1945 and 1965  More frequently in patients at high risk  for Hepatitis C Hep C Antibody: 10/03/2020    Screening Current   Diabetes Screening 1-2 times per year if you're at risk for diabetes or have pre-diabetes Fasting glucose: 83 mg/dL (7/1/2022)  A1C: 5.1 % (6/4/2024)  Screening Current   Cholesterol Screening Once every 5 years if you don't have a lipid disorder. May order more often based on risk factors. Lipid panel: 06/04/2024    Screening Not Indicated  History Lipid Disorder     Other Preventive Screenings Covered by Medicare:  Abdominal Aortic Aneurysm (AAA) Screening: covered once if your at risk. You're considered to be at risk if you have a family history of AAA.  Lung Cancer Screening: covers low dose CT scan once per year if you meet all of the following conditions: (1) Age 55-77; (2) No signs or symptoms of lung cancer; (3) Current smoker or have quit smoking within the last 15 years; (4) You have a tobacco smoking history of at least 20 pack years (packs per day multiplied by number of years you smoked); (5) You get a written order from a healthcare provider.  Glaucoma Screening: covered annually if you're considered high risk: (1) You have diabetes OR (2) Family history of glaucoma OR (3)  aged 50 and older OR (4)  American aged 65 and older  Osteoporosis Screening: covered every 2 years if you meet one of the following conditions: (1) You're estrogen deficient and at risk for osteoporosis based off medical history and other findings; (2) Have a vertebral abnormality; (3) On glucocorticoid therapy for more than 3 months; (4) Have primary hyperparathyroidism; (5) On osteoporosis medications and need to assess response to drug therapy.   Last bone density test (DXA Scan): Not on file.  HIV Screening: covered annually if you're between the age of 15-65. Also covered annually if you are younger than 15 and older than 65 with risk factors for HIV infection. For pregnant patients, it is covered up to 3 times per  pregnancy.    Immunizations:  Immunization Recommendations   Influenza Vaccine Annual influenza vaccination during flu season is recommended for all persons aged >= 6 months who do not have contraindications   Pneumococcal Vaccine   * Pneumococcal conjugate vaccine = PCV13 (Prevnar 13), PCV15 (Vaxneuvance), PCV20 (Prevnar 20)  * Pneumococcal polysaccharide vaccine = PPSV23 (Pneumovax) Adults 19-63 yo with certain risk factors or if 65+ yo  If never received any pneumonia vaccine: recommend Prevnar 20 (PCV20)  Give PCV20 if previously received 1 dose of PCV13 or PPSV23   Hepatitis B Vaccine 3 dose series if at intermediate or high risk (ex: diabetes, end stage renal disease, liver disease)   Respiratory syncytial virus (RSV) Vaccine - COVERED BY MEDICARE PART D  * RSVPreF3 (Arexvy) CDC recommends that adults 60 years of age and older may receive a single dose of RSV vaccine using shared clinical decision-making (SCDM)   Tetanus (Td) Vaccine - COST NOT COVERED BY MEDICARE PART B Following completion of primary series, a booster dose should be given every 10 years to maintain immunity against tetanus. Td may also be given as tetanus wound prophylaxis.   Tdap Vaccine - COST NOT COVERED BY MEDICARE PART B Recommended at least once for all adults. For pregnant patients, recommended with each pregnancy.   Shingles Vaccine (Shingrix) - COST NOT COVERED BY MEDICARE PART B  2 shot series recommended in those 19 years and older who have or will have weakened immune systems or those 50 years and older     Health Maintenance Due:      Topic Date Due   • Breast Cancer Screening: Mammogram  06/13/2023   • Colorectal Cancer Screening  04/02/2032   • Hepatitis C Screening  Completed     Immunizations Due:      Topic Date Due   • COVID-19 Vaccine (4 - 2024-25 season) 09/01/2024     Advance Directives   What are advance directives?  Advance directives are legal documents that state your wishes and plans for medical care. These plans  are made ahead of time in case you lose your ability to make decisions for yourself. Advance directives can apply to any medical decision, such as the treatments you want, and if you want to donate organs.   What are the types of advance directives?  There are many types of advance directives, and each state has rules about how to use them. You may choose a combination of any of the following:  Living will:  This is a written record of the treatment you want. You can also choose which treatments you do not want, which to limit, and which to stop at a certain time. This includes surgery, medicine, IV fluid, and tube feedings.   Durable power of  for healthcare (DPAHC):  This is a written record that states who you want to make healthcare choices for you when you are unable to make them for yourself. This person, called a proxy, is usually a family member or a friend. You may choose more than 1 proxy.  Do not resuscitate (DNR) order:  A DNR order is used in case your heart stops beating or you stop breathing. It is a request not to have certain forms of treatment, such as CPR. A DNR order may be included in other types of advance directives.  Medical directive:  This covers the care that you want if you are in a coma, near death, or unable to make decisions for yourself. You can list the treatments you want for each condition. Treatment may include pain medicine, surgery, blood transfusions, dialysis, IV or tube feedings, and a ventilator (breathing machine).  Values history:  This document has questions about your views, beliefs, and how you feel and think about life. This information can help others choose the care that you would choose.  Why are advance directives important?  An advance directive helps you control your care. Although spoken wishes may be used, it is better to have your wishes written down. Spoken wishes can be misunderstood, or not followed. Treatments may be given even if you do not want  them. An advance directive may make it easier for your family to make difficult choices about your care.   Weight Management   Why it is important to manage your weight:  Being overweight increases your risk of health conditions such as heart disease, high blood pressure, type 2 diabetes, and certain types of cancer. It can also increase your risk for osteoarthritis, sleep apnea, and other respiratory problems. Aim for a slow, steady weight loss. Even a small amount of weight loss can lower your risk of health problems.  How to lose weight safely:  A safe and healthy way to lose weight is to eat fewer calories and get regular exercise. You can lose up about 1 pound a week by decreasing the number of calories you eat by 500 calories each day.   Healthy meal plan for weight management:  A healthy meal plan includes a variety of foods, contains fewer calories, and helps you stay healthy. A healthy meal plan includes the following:  Eat whole-grain foods more often.  A healthy meal plan should contain fiber. Fiber is the part of grains, fruits, and vegetables that is not broken down by your body. Whole-grain foods are healthy and provide extra fiber in your diet. Some examples of whole-grain foods are whole-wheat breads and pastas, oatmeal, brown rice, and bulgur.  Eat a variety of vegetables every day.  Include dark, leafy greens such as spinach, kale, jessie greens, and mustard greens. Eat yellow and orange vegetables such as carrots, sweet potatoes, and winter squash.   Eat a variety of fruits every day.  Choose fresh or canned fruit (canned in its own juice or light syrup) instead of juice. Fruit juice has very little or no fiber.  Eat low-fat dairy foods.  Drink fat-free (skim) milk or 1% milk. Eat fat-free yogurt and low-fat cottage cheese. Try low-fat cheeses such as mozzarella and other reduced-fat cheeses.  Choose meat and other protein foods that are low in fat.  Choose beans or other legumes such as split  peas or lentils. Choose fish, skinless poultry (chicken or turkey), or lean cuts of red meat (beef or pork). Before you cook meat or poultry, cut off any visible fat.   Use less fat and oil.  Try baking foods instead of frying them. Add less fat, such as margarine, sour cream, regular salad dressing and mayonnaise to foods. Eat fewer high-fat foods. Some examples of high-fat foods include french fries, doughnuts, ice cream, and cakes.  Eat fewer sweets.  Limit foods and drinks that are high in sugar. This includes candy, cookies, regular soda, and sweetened drinks.  Exercise:  Exercise at least 30 minutes per day on most days of the week. Some examples of exercise include walking, biking, dancing, and swimming. You can also fit in more physical activity by taking the stairs instead of the elevator or parking farther away from stores. Ask your healthcare provider about the best exercise plan for you.      © Copyright [employer] 2018 Information is for End User's use only and may not be sold, redistributed or otherwise used for commercial purposes. All illustrations and images included in CareNotes® are the copyrighted property of [employer] Inc[employer] or [employer]

## 2025-03-25 ENCOUNTER — OFFICE VISIT (OUTPATIENT)
Dept: PHYSICAL THERAPY | Facility: CLINIC | Age: 70
End: 2025-03-25
Payer: COMMERCIAL

## 2025-03-25 ENCOUNTER — RESULTS FOLLOW-UP (OUTPATIENT)
Dept: FAMILY MEDICINE CLINIC | Facility: CLINIC | Age: 70
End: 2025-03-25

## 2025-03-25 ENCOUNTER — OFFICE VISIT (OUTPATIENT)
Dept: CARDIOLOGY CLINIC | Facility: CLINIC | Age: 70
End: 2025-03-25
Payer: COMMERCIAL

## 2025-03-25 VITALS
SYSTOLIC BLOOD PRESSURE: 126 MMHG | DIASTOLIC BLOOD PRESSURE: 84 MMHG | HEIGHT: 63 IN | WEIGHT: 185 LBS | OXYGEN SATURATION: 96 % | HEART RATE: 64 BPM | BODY MASS INDEX: 32.78 KG/M2 | RESPIRATION RATE: 16 BRPM

## 2025-03-25 DIAGNOSIS — R94.31 ABNORMAL ELECTROCARDIOGRAM (ECG) (EKG): ICD-10-CM

## 2025-03-25 DIAGNOSIS — E78.2 MIXED HYPERLIPIDEMIA: Primary | ICD-10-CM

## 2025-03-25 DIAGNOSIS — R55 SYNCOPE: ICD-10-CM

## 2025-03-25 DIAGNOSIS — I10 ESSENTIAL HYPERTENSION: ICD-10-CM

## 2025-03-25 DIAGNOSIS — H81.10 BENIGN PAROXYSMAL POSITIONAL VERTIGO, UNSPECIFIED LATERALITY: Primary | ICD-10-CM

## 2025-03-25 PROCEDURE — 97112 NEUROMUSCULAR REEDUCATION: CPT | Performed by: PHYSICAL THERAPIST

## 2025-03-25 PROCEDURE — 99204 OFFICE O/P NEW MOD 45 MIN: CPT | Performed by: INTERNAL MEDICINE

## 2025-03-25 NOTE — PROGRESS NOTES
Daily Note     Today's date: 3/25/2025  Patient name: Yasmeen Castle  : 1955  MRN: [id number]  Referring provider: Geovanni Perez PA-C  Dx:   Encounter Diagnosis     ICD-10-CM    1. Benign paroxysmal positional vertigo, unspecified laterality  H81.10           Start Time: 905  Stop Time: 918  Total time in clinic (min): 13 minutes    Subjective: Patient reports that she has been asymptomatic with no dizziness or loss of balance. Patient does have some tinnitus, which she's had for over a year.      Objective: See treatment diary below      Assessment: Tolerated treatment well. Patient with negative positional testing for both horizontal and posterior canals bilaterally. No subjective dizziness during her a/iadls. Patient has been educated to contact PCP for possible referral to ENT or audiologist to address chronic tinnitus. Patient will be discharged at this time.      Plan: Continue per plan of care.        POC expires Unit limit Auth  expiration date PT/OT + Visit Limit?   25 N/A N/A BOMN                 Visit/Unit Tracking  AUTH Status:  Date 3/17 3/21 3/25            N/A Used 1 2 3             Remaining                  Precautions: standard      Manuals 3/17 3/21 3/25          L Epley x2 Not needed Not needed          Positional testing  (-) (-)                                    Neuro Re-Ed             Patient education: pathophysiology, differential diagnosis GR GR GR                                                                                        Ther Ex                                                                                                                     Ther Activity                                       Gait Training                                       Modalities

## 2025-03-25 NOTE — PROGRESS NOTES
Cardiology Consultation     Yasmeen Castle  [id number]  1955  [address] [facility]  [facility] [place] [id number]    Impression:  Syncope, brief, during UTI  Hypertension- well controlled off meds   on no meds.     Plan:  All previous cardiac testing reviewed.  She is now asymptomatic.  Blood pressure and lipids are well-controlled.  No need for cardiac testing at this time.  She will contact the office should she have recurrent symptoms.  She tells me she had a cardiac monitor but it is not in the system.  All questions answered.      The patient is a 70-year-old female who presents for cardiac evaluation. She says he doc told her to come in. She c/o tinitis L ear. No cp, sob, palpitations. After further questioning In Feb in \[facility] [place] [facility]\"" she was standing at computer and felt presyncopal. She was incoherent. Probably syncopal for a couple seconds. She was sent to the er. She had not been feeling well prior. She was diagnosed with UTI. TSH, trops, cxr negative. EKG, NSR, voltage for LVH, nonspecific T wave abnormality. She says she had a monitor that was negative but not in the system.         Echocardiogram 3/17/2025: Normal LV function, EF 55%.  Mild LAE.  Mild mitral regurgitation.  Mild TR.  RVSP 44 mmHg.      Patient Active Problem List   Diagnosis    Asthma    Essential hypertension    Hyperlipidemia    Nontoxic uninodular goiter    Vitamin D deficiency    H/O bariatric surgery    Toe pain, chronic, right    Pain in left hand    Right hip pain    Annual physical exam    Vitamin B 12 deficiency    Asymmetric subjective nonpulsatile tinnitus without hearing loss, otoscopic finding, neurologic deficit, or head trauma    New onset tinnitus of left ear    Right nasal polyps    Dizziness    Memory loss, short term    Initial Medicare annual wellness visit     Past Medical History:   Diagnosis Date    Anais, right  05/06/2022    Chronic GERD 02/07/2018    Ear problems     occassiobnal pain    Hypertension     Nasal congestion     Tinnitus     Trochanteric bursitis 10/21/2019     Social History     Socioeconomic History    Marital status: Single     Spouse name: Not on file    Number of children: Not on file    Years of education: Not on file    Highest education level: Not on file   Occupational History    Not on file   Tobacco Use    Smoking status: Never    Smokeless tobacco: Never   Vaping Use    Vaping status: Never Used   Substance and Sexual Activity    Alcohol use: No    Drug use: No    Sexual activity: Not on file   Other Topics Concern    Not on file   Social History Narrative    Lives with boyfriend      [facility] ([facility])      Social Drivers of Health     Financial Resource Strain: Not on file   Food Insecurity: Patient Declined (3/24/2025)    Hunger Vital Sign     Worried About Running Out of Food in the Last Year: Patient declined     Ran Out of Food in the Last Year: Patient declined   Transportation Needs: Patient Declined (3/24/2025)    PRAPARE - Transportation     Lack of Transportation (Medical): Patient declined     Lack of Transportation (Non-Medical): Patient declined   Physical Activity: Not on file   Stress: Not on file   Social Connections: Not on file   Intimate Partner Violence: Not on file   Housing Stability: Patient Declined (3/24/2025)    Housing Stability Vital Sign     Unable to Pay for Housing in the Last Year: Patient declined     Number of Times Moved in the Last Year: 1     Homeless in the Last Year: Patient declined      Family History   Problem Relation Age of Onset    Hypertension Mother     Diabetes Mother     No Known Problems Father     No Known Problems Maternal Grandmother     No Known Problems Maternal Grandfather     No Known Problems Paternal Grandmother     No Known Problems Paternal Grandfather     No Known Problems Maternal Aunt     No Known Problems Maternal Aunt     No  "Known Problems Paternal Aunt     No Known Problems Paternal Aunt     No Known Problems Paternal Uncle     No Known Problems Paternal Uncle     No Known Problems Half-Sister     No Known Problems Half-Sister      Past Surgical History:   Procedure Laterality Date    GASTRECTOMY SLEEVE LAPAROSCOPIC      [place] ARTHRODESIS GREAT TOE METATARSOPHALANGEAL JOINT Right 7/22/2022    Procedure: ARTHRODESIS / FUSION FOOT;  Surgeon: Yan Hayden DPM;  Location: [facility];  Service: Podiatry    UTERINE FIBROID SURGERY         Current Outpatient Medications:     acetaminophen (TYLENOL) 500 mg tablet, Take 500 mg by mouth every 6 (six) hours as needed for mild pain, Disp: , Rfl:     albuterol (ProAir HFA) 90 mcg/act inhaler, Inhale 2 puffs every 6 (six) hours as needed for wheezing, Disp: 8.5 g, Rfl: 0    Diclofenac Sodium (VOLTAREN) 1 %, Apply 2 g topically 2 (two) times a day for 15 days, Disp: 60 g, Rfl: 0    ergocalciferol (VITAMIN D2) 50,000 units, Take 1 capsule (50,000 Units total) by mouth once a week, Disp: 12 capsule, Rfl: 1    ofloxacin (OCUFLOX) 0.3 % ophthalmic solution, Administer 1 drop into the left eye 4 (four) times a day, Disp: 5 mL, Rfl: 0    prednisoLONE acetate (PRED FORTE) 1 % ophthalmic suspension, instill 1 drop into each eye 2 times per day, Disp: , Rfl:     vitamin B-12 (CYANOCOBALAMIN) 250 MCG TABS, Take 1 tablet (250 mcg total) by mouth daily, Disp: 90 tablet, Rfl: 1  Allergies   Allergen Reactions    Aspirin Diarrhea and Vomiting     Vitals:    03/25/25 1108   BP: 126/84   BP Location: Left arm   Patient Position: Sitting   Cuff Size: Standard   Pulse: 64   Resp: 16   SpO2: 96%   Weight: 83.9 kg (185 lb)   Height: 5' 3\" (1.6 m)       Labs:  Appointment on 03/24/2025   Component Date Value    Cholesterol 03/24/2025 201 (H)     Triglycerides 03/24/2025 132     HDL, Direct 03/24/2025 67     LDL Calculated 03/24/2025 108 (H)     Hemoglobin A1C 03/24/2025 4.9     EAG 03/24/2025 [address] [place] [address]" Outpatient Visit on 03/17/2025   Component Date Value    BSA 03/17/2025 1.88     A4C EF 03/17/2025 60     LVIDd 03/17/2025 4.50     LVIDS 03/17/2025 3.00     IVSd 03/17/2025 1.00     LVPWd 03/17/2025 1.00     LVOT peak VTI 03/17/2025 25.26     FS 03/17/2025 33     MV E' Tissue Velocity Se* 03/17/2025 9     MV E' Tissue Velocity La* 03/17/2025 10     LA Volume Index (BP) 03/17/2025 37.2     E/A ratio 03/17/2025 1.20     E wave deceleration time 03/17/2025 190     MV Peak E [place] 03/17/2025 104     MV Peak A [place] 03/17/2025 0.87     AV LVOT peak gradient 03/17/2025 4     LVOT peak tristan 03/17/2025 1.06     RVID d 03/17/2025 3.9     Tricuspid annular plane * 03/17/2025 1.70     LA size 03/17/2025 3.8     LA length (A2C) 03/17/2025 5.60     LA volume (BP) 03/17/2025 70     RAA A4C 03/17/2025 18.2     Aortic valve peak veloci* 03/17/2025 1.59     Ao VTI 03/17/2025 34.73     AV mean gradient 03/17/2025 5     LVOT mn grad 03/17/2025 2.0     AV peak gradient 03/17/2025 10     MV stenosis pressure 1/2* 03/17/2025 55     MV valve area p 1/2 meth* 03/17/2025 4.00     TR Peak Tristan 03/17/2025 2.7     Triscuspid Valve Regurgi* 03/17/2025 29.0     PV peak gradient antegra* 03/17/2025 19     Ao root 03/17/2025 2.80     Aortic valve mean veloci* 03/17/2025 10.30     Tricuspid valve peak reg* 03/17/2025 2.68     Left ventricular stroke * 03/17/2025 59.00     IVS 03/17/2025 1     LEFT VENTRICLE SYSTOLIC * 03/17/2025 35     LV DIASTOLIC VOLUME (MOD* 03/17/2025 93     Left Atrium Area-systoli* 03/17/2025 23.9     Left Atrium Area-systoli* 03/17/2025 20.5     LVSV, 2D 03/17/2025 59     DVI 03/17/2025 0.73     LV EF 03/17/2025 55     Est. RA pres 03/17/2025 15.0     Right Ventricular Peak S* 03/17/2025 44.00    Admission on 02/06/2025, Discharged on 02/06/2025   Component Date Value    Ventricular Rate 02/06/2025 62     Atrial Rate 02/06/2025 62     OH Interval 02/06/2025 176     QRSD Interval 02/06/2025 92     QT Interval 02/06/2025 404      QTC Interval 02/06/2025 410     P [place] 02/06/2025 60     QRS Axis 02/06/2025 15     T Wave [place] 02/06/2025 -5     WBC 02/06/2025 8.10     RBC 02/06/2025 4.32     Hemoglobin 02/06/2025 13.8     Hematocrit 02/06/2025 42.9     MCV 02/06/2025 99 (H)     MCH 02/06/2025 31.9     MCHC 02/06/2025 32.2     RDW 02/06/2025 12.5     MPV 02/06/2025 10.4     Platelets 02/06/2025 332     nRBC 02/06/2025 0     Segmented % 02/06/2025 68     Immature Grans % 02/06/2025 0     Lymphocytes % 02/06/2025 25     Monocytes % 02/06/2025 4     Eosinophils Relative 02/06/2025 2     Basophils Relative 02/06/2025 1     Absolute Neutrophils 02/06/2025 5.54     Absolute Immature Grans 02/06/2025 0.03     Absolute Lymphocytes 02/06/2025 2.00     Absolute Monocytes 02/06/2025 0.36     Eosinophils Absolute 02/06/2025 0.13     Basophils Absolute 02/06/2025 0.04     Sodium 02/06/2025 139     Potassium 02/06/2025 3.5     Chloride 02/06/2025 104     CO2 02/06/2025 26     ANION GAP 02/06/2025 9     BUN 02/06/2025 16     Creatinine 02/06/2025 1.13     Glucose 02/06/2025 109     Calcium 02/06/2025 9.8     AST 02/06/2025 15     ALT 02/06/2025 12     Alkaline Phosphatase 02/06/2025 100     Total Protein 02/06/2025 7.6     Albumin 02/06/2025 4.3     Total Bilirubin 02/06/2025 0.82     eGFR 02/06/2025 50     hs TnI 0hr 02/06/2025 3     POC Glucose 02/06/2025 106     hs TnI 2hr 02/06/2025 3     Delta 2hr hsTnI 02/06/2025 0     TSH 3RD GENERATON 02/06/2025 1.660     Color, UA 02/06/2025 Yellow     Clarity, UA 02/06/2025 Turbid     Specific Gravity, UA 02/06/2025 1.027     pH, UA 02/06/2025 5.5     Leukocytes, UA 02/06/2025 Large (A)     Nitrite, UA 02/06/2025 Negative     Protein, UA 02/06/2025 Trace (A)     Glucose, UA 02/06/2025 Negative     Ketones, UA 02/06/2025 Negative     Urobilinogen, UA 02/06/2025 <2.0     Bilirubin, UA 02/06/2025 Negative     Occult Blood, UA 02/06/2025 Negative     RBC, UA 02/06/2025 10-20 (A)     WBC, UA 02/06/2025 10-20 (A)      Epithelial Cells 02/06/2025 Occasional     Bacteria, UA 02/06/2025 Occasional     MUCUS THREADS 02/06/2025 Occasional (A)     Hyaline Casts, UA 02/06/2025 3-5 (A)     Ca Oxalate [place], UA 02/06/2025 Occasional (A)     Urine Culture 02/06/2025 >100,000 cfu/ml    Appointment on 12/04/2024   Component Date Value    WBC 12/04/2024 7.29     RBC 12/04/2024 4.16     Hemoglobin 12/04/2024 13.6     Hematocrit 12/04/2024 41.9     MCV 12/04/2024 101 (H)     MCH 12/04/2024 32.7     MCHC 12/04/2024 32.5     RDW 12/04/2024 12.8     MPV 12/04/2024 10.7     Platelets 12/04/2024 353     nRBC 12/04/2024 0     Segmented % 12/04/2024 68     Immature Grans % 12/04/2024 0     Lymphocytes % 12/04/2024 25     Monocytes % 12/04/2024 4     Eosinophils Relative 12/04/2024 3     Basophils Relative 12/04/2024 0     Absolute Neutrophils 12/04/2024 4.95     Absolute Immature Grans 12/04/2024 0.01     Absolute Lymphocytes 12/04/2024 1.84     Absolute Monocytes 12/04/2024 0.28     Eosinophils Absolute 12/04/2024 0.18     Basophils Absolute 12/04/2024 0.03     Syphilis Total Antibody 12/04/2024 Non-reactive     HLA B27 12/04/2024 Negative     Angio Convert Enzyme 12/04/2024 49     Treponema pallidum Antib* 12/04/2024 Non Reactive     Rheumatoid Factor 12/04/2024 Negative     Lyme Total Antibodies 12/04/2024 Negative    Admission on 11/24/2024, Discharged on 11/24/2024   Component Date Value    Ventricular Rate 11/24/2024 82     Atrial Rate 11/24/2024 82     NM Interval 11/24/2024 174     QRSD Interval 11/24/2024 98     QT Interval 11/24/2024 382     QTC Interval 11/24/2024 446     P Axis 11/24/2024 61     QRS Axis 11/24/2024 39     T Wave [place] 11/24/2024 -2     WBC 11/24/2024 7.96     RBC 11/24/2024 3.92     Hemoglobin 11/24/2024 12.6     Hematocrit 11/24/2024 38.1     MCV 11/24/2024 97     MCH 11/24/2024 32.1     MCHC 11/24/2024 33.1     RDW 11/24/2024 12.9     MPV 11/24/2024 9.4     Platelets 11/24/2024 320     nRBC 11/24/2024 0     Segmented %  11/24/2024 70     Immature Grans % 11/24/2024 0     Lymphocytes % 11/24/2024 23     Monocytes % 11/24/2024 5     Eosinophils Relative 11/24/2024 1     Basophils Relative 11/24/2024 1     Absolute Neutrophils 11/24/2024 5.62     Absolute Immature Grans 11/24/2024 0.02     Absolute Lymphocytes 11/24/2024 1.81     Absolute Monocytes 11/24/2024 0.37     Eosinophils Absolute 11/24/2024 0.10     Basophils Absolute 11/24/2024 0.04     Sodium 11/24/2024 140     Potassium 11/24/2024 3.4 (L)     Chloride 11/24/2024 104     CO2 11/24/2024 29     ANION GAP 11/24/2024 7     BUN 11/24/2024 11     Creatinine 11/24/2024 0.86     Glucose 11/24/2024 90     Calcium 11/24/2024 8.9     eGFR 11/24/2024 70    Office Visit on 10/02/2024   Component Date Value     RAPID STREP A 10/02/2024 Negative      Imaging: Echo complete w/ contrast if indicated  Result Date: 3/17/2025  Narrative:   Left Ventricle: Left ventricular cavity size is normal. Wall thickness is normal. The left ventricular ejection fraction is 55%. Systolic function is normal. Wall motion is normal. Diastolic function is normal.   Right Ventricle: Right ventricular cavity size is normal. Systolic function is normal.   Left Atrium: The atrium is mildly dilated.   Right Atrium: The atrium is upper normal in size.   Mitral Valve: There is mild regurgitation.   Tricuspid Valve: There is mild regurgitation. The right ventricular systolic pressure is mildly elevated. The estimated right ventricular systolic pressure is 44.00 mmHg.   Pulmonic Valve: There is mild to moderate regurgitation.   IVC/SVC: The right atrial pressure is estimated at 15.0 mmHg. The inferior vena cava is dilated. Respirophasic changes were blunted (less than 50% variation).       Review of Systems:  Review of SystemsNegative except for HPI    Physical Exam:  Physical Exam  GEN: Alert and oriented x 3, in no acute distress.  Well appearing and well nourished.   HEENT: Sclera anicteric, conjunctivae pink,  mucous membranes moist. Oropharynx clear.   NECK: Supple, no carotid bruits, no significant JVD. Trachea midline, no thyromegaly.   HEART: Regular rhythm, normal S1 and S2, no murmurs, clicks, gallops or rubs. PMI nondisplaced, no thrills.   LUNGS: Clear to auscultation bilaterally; no wheezes, rales, or rhonchi. No increased work of breathing or signs of respiratory distress.   ABDOMEN: Soft, nontender, nondistended, normoactive bowel sounds.   EXTREMITIES: Skin warm and well perfused, no clubbing, cyanosis, or edema.  NEURO: No focal findings. Normal speech. Mood and affect normal.   SKIN: Normal without suspicious lesions on exposed skin.       1. Mixed hyperlipidemia        2. Syncope  Ambulatory Referral to Cardiology      3. Essential hypertension        4. Abnormal electrocardiogram (ECG) (EKG)

## 2025-03-28 ENCOUNTER — APPOINTMENT (OUTPATIENT)
Dept: PHYSICAL THERAPY | Facility: CLINIC | Age: 70
End: 2025-03-28
Payer: COMMERCIAL

## 2025-03-31 ENCOUNTER — TELEPHONE (OUTPATIENT)
Age: 70
End: 2025-03-31

## 2025-03-31 ENCOUNTER — HOSPITAL ENCOUNTER (OUTPATIENT)
Dept: RADIOLOGY | Facility: MEDICAL CENTER | Age: 70
Discharge: HOME | End: 2025-03-31
Payer: COMMERCIAL

## 2025-03-31 ENCOUNTER — APPOINTMENT (OUTPATIENT)
Dept: RADIOLOGY | Facility: MEDICAL CENTER | Age: 70
End: 2025-03-31
Payer: COMMERCIAL

## 2025-03-31 DIAGNOSIS — Z13.820 ENCOUNTER FOR OSTEOPOROSIS SCREENING IN ASYMPTOMATIC POSTMENOPAUSAL PATIENT: ICD-10-CM

## 2025-03-31 DIAGNOSIS — Z78.0 ENCOUNTER FOR OSTEOPOROSIS SCREENING IN ASYMPTOMATIC POSTMENOPAUSAL PATIENT: ICD-10-CM

## 2025-03-31 PROCEDURE — 77080 DXA BONE DENSITY AXIAL: CPT

## 2025-03-31 NOTE — TELEPHONE ENCOUNTER
Patient called for lab results. Relayed message from Liseth verbatim. Patient requests call back when DXA scan finalized.

## 2025-04-01 ENCOUNTER — APPOINTMENT (OUTPATIENT)
Dept: PHYSICAL THERAPY | Facility: CLINIC | Age: 70
End: 2025-04-01
Payer: MEDICARE

## 2025-04-01 ENCOUNTER — OFFICE VISIT (OUTPATIENT)
Dept: FAMILY MEDICINE CLINIC | Facility: CLINIC | Age: 70
End: 2025-04-01
Payer: COMMERCIAL

## 2025-04-01 ENCOUNTER — APPOINTMENT (OUTPATIENT)
Dept: LAB | Facility: CLINIC | Age: 70
End: 2025-04-01
Payer: COMMERCIAL

## 2025-04-01 VITALS
BODY MASS INDEX: 32.78 KG/M2 | SYSTOLIC BLOOD PRESSURE: 124 MMHG | OXYGEN SATURATION: 99 % | HEART RATE: 61 BPM | TEMPERATURE: 97 F | HEIGHT: 63 IN | DIASTOLIC BLOOD PRESSURE: 78 MMHG | WEIGHT: 185 LBS
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
## 2025-04-01 DIAGNOSIS — H81.10 BENIGN PAROXYSMAL POSITIONAL VERTIGO, UNSPECIFIED LATERALITY: Primary | ICD-10-CM

## 2025-04-01 PROBLEM — Z00.00 INITIAL MEDICARE ANNUAL WELLNESS VISIT: Status: RESOLVED | Noted: 2025-03-24 | Resolved: 2025-04-01

## 2025-04-01 PROBLEM — Z00.00 ANNUAL PHYSICAL EXAM: Status: RESOLVED | Noted: 2024-08-29 | Resolved: 2025-04-01

## 2025-04-01 LAB — ALT SERPL W P-5'-P-CCNC: 12 U/L (ref 7–52)

## 2025-04-01 PROCEDURE — 84460 ALANINE AMINO (ALT) (SGPT): CPT

## 2025-04-01 PROCEDURE — 87389 HIV-1 AG W/HIV-1&-2 AB AG IA: CPT

## 2025-04-01 PROCEDURE — 99214 OFFICE O/P EST MOD 30 MIN: CPT

## 2025-04-01 PROCEDURE — 86803 HEPATITIS C AB TEST: CPT

## 2025-04-01 PROCEDURE — 36415 COLL VENOUS BLD VENIPUNCTURE: CPT

## 2025-04-01 PROCEDURE — 86706 HEP B SURFACE ANTIBODY: CPT

## 2025-04-01 PROCEDURE — 87340 HEPATITIS B SURFACE AG IA: CPT

## 2025-04-01 RX ORDER — MECLIZINE HYDROCHLORIDE 25 MG/1
25 TABLET ORAL 3 TIMES DAILY PRN
Qty: 30 TABLET | Refills: 0 | Status: SHIPPED | OUTPATIENT
Start: 2025-04-01

## 2025-04-01 NOTE — PROGRESS NOTES
Name: Yasmeen Castle      : 1955      MRN: [id number]  Encounter Provider: Per Mcqueen PA-C  Encounter Date: 2025   Encounter department: [facility]    Assessment & Plan  Benign paroxysmal positional vertigo, unspecified laterality  Pt recounts chronic vertigo sx for about 2 years, worsening over the last week  It has been overall stable and responding to PT, but had worsening episode  She recounts last night she felt an episode of feeling like she will fall over, similar to her prior vertigo symptoms in the past  Examination is overall unremarkable/reassuring, dedicated neurologic exam reveals no cranial nerve deficit cerebellar function intact gait normal range of motion and strength is equal in bilateral both sides vertigo, no evidence of otitis media  Based on history and examination, patient's recent increase in symptoms do represent vertigo  After discussing risk and benefits, will trial meclizine for vertigo symptoms  Patient is scheduled for vestibular PT and I recommend she present to the PT session  Orders:    meclizine (ANTIVERT) 25 mg tablet; Take 1 tablet (25 mg total) by mouth 3 (three) times a day as needed for dizziness    Needlestick injury accident with exposure to body fluid  3-4 weeks ago, pt is phlebotomist PCA   Was gloved, but pt stuck herself, states mainly the glove but did feel a pinch   She states that allegedly the patient that she suffered the needlestick from did not have any blood-borne disease but there was also concern for hepatitis C the same day on a patient  Will order exposure panel to assess for any blood-borne illness, did advise in the future that patient should immediately notify her superior for further needlestick injuries  Orders:    Exposure panel - baseline; Future         History of Present Illness     Yasmeen Castle is a 70 y.o. female  presenting for worsening vertigo sx. she also mentions concern for a past needlestick  "injury that she has not been tested for any blood-borne illnesses after        **Note: Portions of the record may have been created with voice recognition software.  Occasional wrong word or \"sound alike\" substitutions may have occurred due to the inherent limitations of voice recognition software.  Please read the chart carefully and recognize, using context, where substitutions have occurred. Please contact for further clarification, when necessary.       Review of Systems   Constitutional:  Negative for chills and fever.   HENT:  Negative for ear pain and sore throat.    Eyes:  Negative for pain and visual disturbance.   Respiratory:  Negative for cough, chest tightness and shortness of breath.    Cardiovascular:  Negative for chest pain and palpitations.   Gastrointestinal:  Negative for abdominal pain and vomiting.   Genitourinary:  Negative for dysuria and hematuria.   Musculoskeletal:  Negative for arthralgias and back pain.   Skin:  Negative for color change and rash.   Neurological:  Positive for dizziness. Negative for tremors, seizures, syncope, facial asymmetry, speech difficulty, weakness, light-headedness, numbness and headaches.   All other systems reviewed and are negative.    Past Medical History:   Diagnosis Date    Bunion, right 05/06/2022    Chronic GERD 02/07/2018    Ear problems     occassiobnal pain    Hypertension     Nasal congestion     Tinnitus     Trochanteric bursitis 10/21/2019     Past Surgical History:   Procedure Laterality Date    GASTRECTOMY SLEEVE LAPAROSCOPIC      [place] ARTHRODESIS GREAT TOE METATARSOPHALANGEAL JOINT Right 7/22/2022    Procedure: ARTHRODESIS / FUSION FOOT;  Surgeon: Yan Hayden DPM;  Location: [facility];  Service: Podiatry    UTERINE FIBROID SURGERY       Family History   Problem Relation Age of Onset    Hypertension Mother     Diabetes Mother     No Known Problems Father     No Known Problems Maternal Grandmother     No Known Problems Maternal " "Grandfather     No Known Problems Paternal Grandmother     No Known Problems Paternal Grandfather     No Known Problems Maternal Aunt     No Known Problems Maternal Aunt     No Known Problems Paternal Aunt     No Known Problems Paternal Aunt     No Known Problems Paternal Uncle     No Known Problems Paternal Uncle     No Known Problems Half-Sister     No Known Problems Half-Sister      Social History     Tobacco Use    Smoking status: Never    Smokeless tobacco: Never   Vaping Use    Vaping status: Never Used   Substance and Sexual Activity    Alcohol use: No    Drug use: No    Sexual activity: Not on file     Current Outpatient Medications on File Prior to Visit   Medication Sig    acetaminophen (TYLENOL) 500 mg tablet Take 500 mg by mouth every 6 (six) hours as needed for mild pain    albuterol (ProAir HFA) 90 mcg/act inhaler Inhale 2 puffs every 6 (six) hours as needed for wheezing    Diclofenac Sodium (VOLTAREN) 1 % Apply 2 g topically 2 (two) times a day for 15 days    ergocalciferol (VITAMIN D2) 50,000 units Take 1 capsule (50,000 Units total) by mouth once a week    ofloxacin (OCUFLOX) 0.3 % ophthalmic solution Administer 1 drop into the left eye 4 (four) times a day    prednisoLONE acetate (PRED FORTE) 1 % ophthalmic suspension instill 1 drop into each eye 2 times per day    vitamin B-12 (CYANOCOBALAMIN) 250 MCG TABS Take 1 tablet (250 mcg total) by mouth daily     Allergies   Allergen Reactions    Aspirin Diarrhea and Vomiting     Immunization History   Administered Date(s) Administered    COVID-19 PFIZER VACCINE 0.3 ML IM 12/21/2020, 01/10/2021, 12/28/2021    INFLUENZA 10/17/2019, 12/08/2022    Influenza, high dose seasonal 0.7 mL 11/15/2023    Tdap 10/22/2024    influenza, trivalent, adjuvanted 10/22/2024     Objective   /78 (BP Location: Left arm, Patient Position: Sitting, Cuff Size: Large)   Pulse 61   Temp (!) 97 °F (36.1 °C)   Ht 5' 3\" (1.6 m)   Wt 83.9 kg (185 lb)   SpO2 99%   BMI 32.77 " kg/m²     Physical Exam  Vitals and nursing note reviewed.   Constitutional:       General: She is not in acute distress.     Appearance: She is well-developed.   HENT:      Head: Normocephalic and atraumatic.      Right Ear: Tympanic membrane normal.      Left Ear: Tympanic membrane normal.      Nose: Nose normal.      Mouth/Throat:      Pharynx: Oropharynx is clear.   Eyes:      Conjunctiva/sclera: Conjunctivae normal.      Pupils: Pupils are equal, round, and reactive to light.   Cardiovascular:      Rate and Rhythm: Normal rate and regular rhythm.      Heart sounds: No murmur heard.  Pulmonary:      Effort: Pulmonary effort is normal. No respiratory distress.      Breath sounds: Normal breath sounds. No stridor. No wheezing or rhonchi.   Abdominal:      General: Abdomen is flat.   Musculoskeletal:         General: No swelling.      Cervical back: Neck supple.   Skin:     General: Skin is warm and dry.   Neurological:      General: No focal deficit present.      Mental Status: She is alert and oriented to person, place, and time. Mental status is at baseline.      Cranial Nerves: No cranial nerve deficit.      Sensory: No sensory deficit.      Motor: No weakness.      Coordination: Coordination normal.      Gait: Gait normal.   Psychiatric:         Mood and Affect: Mood normal.

## 2025-04-02 ENCOUNTER — RESULTS FOLLOW-UP (OUTPATIENT)
Dept: FAMILY MEDICINE CLINIC | Facility: CLINIC | Age: 70
End: 2025-04-02

## 2025-04-02 LAB
HBV SURFACE AB SER-ACNC: >500 MIU/ML
HBV SURFACE AG SER QL: NORMAL
HCV AB SER QL: NORMAL
HIV 1+2 AB+HIV1 P24 AG SERPL QL IA: NORMAL

## 2025-04-03 ENCOUNTER — RESULTS FOLLOW-UP (OUTPATIENT)
Dept: FAMILY MEDICINE CLINIC | Facility: CLINIC | Age: 70
End: 2025-04-03

## 2025-04-04 ENCOUNTER — APPOINTMENT (OUTPATIENT)
Dept: PHYSICAL THERAPY | Facility: CLINIC | Age: 70
End: 2025-04-04
Payer: MEDICARE

## 2025-04-10 ENCOUNTER — APPOINTMENT (OUTPATIENT)
Dept: LAB | Facility: CLINIC | Age: 70
End: 2025-04-10
Payer: COMMERCIAL

## 2025-04-10 ENCOUNTER — CONSULT (OUTPATIENT)
Age: 70
End: 2025-04-10
Payer: MEDICARE

## 2025-04-10 VITALS
HEART RATE: 80 BPM | DIASTOLIC BLOOD PRESSURE: 72 MMHG | OXYGEN SATURATION: 99 % | TEMPERATURE: 98 F | SYSTOLIC BLOOD PRESSURE: 118 MMHG | BODY MASS INDEX: 32.06 KG/M2 | WEIGHT: 187.8 LBS | HEIGHT: 64 IN

## 2025-04-10 DIAGNOSIS — R41.3 MEMORY LOSS, SHORT TERM: ICD-10-CM

## 2025-04-10 DIAGNOSIS — R42 DIZZINESS: ICD-10-CM

## 2025-04-10 DIAGNOSIS — M54.50 CHRONIC BILATERAL LOW BACK PAIN, UNSPECIFIED WHETHER SCIATICA PRESENT: ICD-10-CM

## 2025-04-10 DIAGNOSIS — G89.29 CHRONIC BILATERAL LOW BACK PAIN, UNSPECIFIED WHETHER SCIATICA PRESENT: ICD-10-CM

## 2025-04-10 DIAGNOSIS — R41.3 MEMORY LOSS, SHORT TERM: Primary | ICD-10-CM

## 2025-04-10 LAB
FOLATE SERPL-MCNC: 12.4 NG/ML
VIT B12 SERPL-MCNC: 232 PG/ML (ref 180–914)

## 2025-04-10 PROCEDURE — 82607 VITAMIN B-12: CPT

## 2025-04-10 PROCEDURE — 99205 OFFICE O/P NEW HI 60 MIN: CPT | Performed by: NURSE PRACTITIONER

## 2025-04-10 PROCEDURE — 36415 COLL VENOUS BLD VENIPUNCTURE: CPT

## 2025-04-10 PROCEDURE — 82746 ASSAY OF FOLIC ACID SERUM: CPT

## 2025-04-10 NOTE — ASSESSMENT & PLAN NOTE
Chronic on and off lower back pain  Cont prn tylenol/topical analgesics.  Cont nonpharm methods of pain control.  Avoid nsaids  F/u with pcp for increased or changes in pain type

## 2025-04-10 NOTE — ASSESSMENT & PLAN NOTE
Currently at PT for vertigo.  Meclizine helping.  Reports she has seen ENT (sinus issues, ear ringing issues).  Cont f/u with pcp and consider neuro eval in future as needed.

## 2025-04-10 NOTE — ASSESSMENT & PLAN NOTE
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  Deficits in:  all domains but naming  Pt independent with adl, independent for iadls.  Memory loss:  STM loss not affecting function, but might be starting to  Contributing factors:  mood, dizziness  Memory meds:  can discuss at care conference  Further eval warrants the following:  jose luis labs  2.   Decision-making capacity: Recommend making \"bigger\" decisions with care partner/POA as needed.  In future, if concerns for capacity, would refer to neuropsych for full eval  ACP Review: none on file per review  Level of care:  independent = monitor for needs for supports  Caregiver Review:  future planning  Caregiver stress concerns:  future planning, pt still working - will likely be working through end of the year  SW needs this visit:  family would like education materials, legal resources (poa/will)  Safety:  Medication Review: seems appropriate for current conditions.  Beer's list medications:  none.  Independent with medication administration.  Driving: No concerns.   is just starting to monitor.  We discussed that if he has concerns about putting his grandchild in the car with patient, it is time for evaluation.  Discussed FTD if needed in future.  Fall Risk: Low fall risk  Wandering:  No concerns at present.  Home:  Has left stove on - recommend setting timer any time using the stove to help remind  Financial/Scam safety:  No concerns at present.  Do not answer suspicious mail, phone calls, email, or text message without having second person review d/t safety risk.  Do not give out personal info to questionable sources- read company safety policies for how they might contact you.  3.  Continue to engage in physical, cognitive, social activity.  Cont doing games, puzzles, trivia, current event discussions  Cont daily walks or exercise video  Cont outings with friends and family.  Avoid social " isolation.  Referral to cognitive therapy:  outpt ST  4.  Optimize chronic and acute conditions  Cont to f/u with providers w/chronic conditions and ensure medication compliance  Vascular risk factors:  hld, htn  Geriatric syndromes:  dizziness, memory loss, hip pain  Chronic condition concerns:  *  Ensure good diet (ex Mediterranean diet) and adequate hydration  Monitor for changes in behavior/mood- if noted, notify provider for eval  Encourage mindfulness and positive socialization for general wellness.  5.  Do not overwhelm pt with info.    Do one task at a time. Use slower pace.  Keep to one conversation at a time.  Do not multitask.  6.  Encourage independence as able.  7.  Recommended next follow up: care conference

## 2025-04-10 NOTE — PROGRESS NOTES
"  ASSESSMENT AND PLAN:  1. 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  Deficits in:  all domains but naming  Pt independent with adl, independent for iadls.  Memory loss:  STM loss not affecting function, but might be starting to  Contributing factors:  mood, dizziness  Memory meds:  can discuss at care conference  Further eval warrants the following:  jose luis labs  2.   Decision-making capacity: Recommend making \"bigger\" decisions with care partner/POA as needed.  In future, if concerns for capacity, would refer to neuropsych for full eval  ACP Review: none on file per review  Level of care:  independent = monitor for needs for supports  Caregiver Review:  future planning  Caregiver stress concerns:  future planning, pt still working - will likely be working through end of the year  SW needs this visit:  family would like education materials, legal resources (poa/will)  Safety:  Medication Review: seems appropriate for current conditions.  Beer's list medications:  none.  Independent with medication administration.  Driving: No concerns.   is just starting to monitor.  We discussed that if he has concerns about putting his grandchild in the car with patient, it is time for evaluation.  Discussed FTD if needed in future.  Fall Risk: Low fall risk  Wandering:  No concerns at present.  Home:  Has left stove on - recommend setting timer any time using the stove to help remind  Financial/Scam safety:  No concerns at present.  Do not answer suspicious mail, phone calls, email, or text message without having second person review d/t safety risk.  Do not give out personal info to questionable sources- read company safety policies for how they might contact you.  3.  Continue to engage in physical, cognitive, social activity.  Cont doing games, puzzles, trivia, current event discussions  Cont daily walks or exercise " video  Cont outings with friends and family.  Avoid social isolation.  Referral to cognitive therapy:  outpt ST  4.  Optimize chronic and acute conditions  Cont to f/u with providers w/chronic conditions and ensure medication compliance  Vascular risk factors:  hld, htn  Geriatric syndromes:  dizziness, memory loss, hip pain  Chronic condition concerns:  *  Ensure good diet (ex Mediterranean diet) and adequate hydration  Monitor for changes in behavior/mood- if noted, notify provider for eval  Encourage mindfulness and positive socialization for general wellness.  5.  Do not overwhelm pt with info.    Do one task at a time. Use slower pace.  Keep to one conversation at a time.  Do not multitask.  6.  Encourage independence as able.  7.  Recommended next follow up: care conference    Orders:  -     Ambulatory Referral to [facility]  -     Vitamin B12/Folate, Serum Panel; Future  -     Ambulatory Referral to Speech Therapy; Future  2. Dizziness  Assessment & Plan:  Currently at PT for vertigo.  Meclizine helping.  Reports she has seen ENT (sinus issues, ear ringing issues).  Cont f/u with pcp and consider neuro eval in future as needed.  Orders:  -     [facility] Ref[facility]  3. Chronic bilateral low back pain, unspecified whether sciatica present  Assessment & Plan:  Chronic on and off lower back pain  Cont prn tylenol/topical analgesics.  Cont nonpharm methods of pain control.  Avoid nsaids  F/u with pcp for increased or changes in pain type        HPI:    We had the pleasure of evaluating Yasmeen Castle who is a 70 y.o. female in Geriatric consultation today, along with her  to provide further history.  Pt lives with her     Per patient:  PCP made appt for her to come to visit because she couldn't answer questions when she was sick.  She does not feel memory has changed.  Not misplacing things more often.  No trouble with cooking - not forgetting recipes or trouble with stove.  Still  good with learning new info.  She is doing good working.  She is not forgetting at work, but was told they notice she was stressed out.   When ringing in the ears stops she feels better - she feels worse when ringing starts again.  She tries not to over stress because this makes things worse.   She is going to the doctor for her eyes- she lesions in eye that eye doc fixed with laser surgery and eye drops.  Sleep is doing ok.  Appetite is doing ok.  At first was very bad ringing in the ears and vertigo-  she takes meclizine 25mg daily to stop dizziness.   She did have a fall - does have left over back and shoulder pain.  She did bang her head with fall.  This occurred before she had MRI done.  Tylenol helps.  He takes care of finances (always).  She is good with medications.  Cognitive:  word book.  Nathalie.  Physical:  work, walking.  Social:  work and Judaism.      Per Care Partner:  Mood has changed- more laid back when previously was more intense.  Forgetting stove.  Forgetting convo sometimes.  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  She does not recognize memory loss as much,  recognizes changes and was concerned.  This started in November when vertigo started.  She does work night shift - not sure if some fog from poor sleep.  She has told  she hears kids at night when she leaves for work (no kids out at night when she leaves).  She gets around ok.  Did get treated for cataracts - is getting drops, seeing PT for vertigo, does have meds for that.  SHe hears ringing - has gone to ENT    COGNITION:  Memory Issues noticed since 6 month(s)    Memory affected: short term memory loss    Symptoms started: sudden - this all started with ringing in the ears  Over time the memory has: improved ( per pt, not )  Memory issue(s) were noted by:  .    Difficulty finding the right word while speaking: Yes  Requires repeat  information or asking the same question repeatedly: Yes    Family member with dementia and what type? Yes - her mom  Prior diagnosis of memory loss?  No  History of head trauma: Yes - did faint at work, that is when they did MRI  History of stroke: No  History of alcohol or substance abuse: No    MOOD:  Changes in mood or personality: Yes  Seems anxious: No - seem less anxious  Seems depressed: No  With suicidal ideation: No  Current or previous treatment for depression or anxiety: No  Chronic pain: Yes - chronic back    FUNCTIONAL STATUS:  BADLs  Does patient require assistance with:  Bathing: No  Dressing: No  Transferring: No  Continence: No  Toileting: No  Feeding: No    IADLs  Does patient require assistance with:  Telephone: No  Shopping: No  Food Preparation: No -  does most  Housekeeping: No- doing good except did forget stove already  Laundry: No  Transportation: No   Medications: No - doing ok (leaves on the table)  Finances: No - does auto pay    NEUROPSYCH SYMPTOMS:  Is patient less interested in his or her usual activities or in the activities and plans of others? No  Does patient get angry or hostile?  Resist care from others? No  Does patient act impatient and cranky? Does mood frequently change for no apparent reason? No  Does patient have trouble sleeping at night? No - she work 3-4 night shifts a week  Does patient see or hear things that no one else can see or hear? Yes - auditory - kids in the yard  Does patient act suspicious without good reason (example: believes that others are stealing from him or her, or planning to harm him or her in some way)? No    SAFETY:  Hearing issue: No  Vision issue: Yes - still taking eye drops from cataracts (about a month ago was sx)  Any gait or balance disorder: Yes - dizzy and almost tripping  Uses: No Assistive Devices  Any falls in the last year: No  Any history of wandering: No  Can patient be home alone:  Yes  Does patient drive: Yes  Any driving  accidents or citations in the last year: No  Any concerns about patient's ability to drive: No - just starting to monitor  POA papers completed: No  Any legal assistance needed for POA? No    SAFETY (per SW intake): SW aware of identified needs      Allergies   Allergen Reactions    Aspirin Diarrhea and Vomiting       Medications:    Current Outpatient Medications on File Prior to Visit   Medication Sig Dispense Refill    acetaminophen (TYLENOL) 500 mg tablet Take 500 mg by mouth every 6 (six) hours as needed for mild pain      Diclofenac Sodium (VOLTAREN) 1 % Apply 2 g topically 2 (two) times a day for 15 days 60 g 0    ergocalciferol (VITAMIN D2) 50,000 units Take 1 capsule (50,000 Units total) by mouth once a week 12 capsule 1    meclizine (ANTIVERT) 25 mg tablet Take 1 tablet (25 mg total) by mouth 3 (three) times a day as needed for dizziness 30 tablet 0    ofloxacin (OCUFLOX) 0.3 % ophthalmic solution Administer 1 drop into the left eye 4 (four) times a day 5 mL 0    prednisoLONE acetate (PRED FORTE) 1 % ophthalmic suspension instill 1 drop into each eye 2 times per day      vitamin B-12 (CYANOCOBALAMIN) 250 MCG TABS Take 1 tablet (250 mcg total) by mouth daily 90 tablet 1    albuterol (ProAir HFA) 90 mcg/act inhaler Inhale 2 puffs every 6 (six) hours as needed for wheezing (Patient not taking: Reported on 4/10/2025) 8.5 g 0     No current facility-administered medications on file prior to visit.       History:  Past Medical History:   Diagnosis Date    Bunion, right 05/06/2022    Chronic GERD 02/07/2018    Ear problems     occassiobnal pain    Hypertension     Nasal congestion     Tinnitus     Trochanteric bursitis 10/21/2019     Past Surgical History:   Procedure Laterality Date    GASTRECTOMY SLEEVE LAPAROSCOPIC      GA ARTHRODESIS GREAT TOE METATARSOPHALANGEAL JOINT Right 7/22/2022    Procedure: ARTHRODESIS / FUSION FOOT;  Surgeon: Yan Hayden DPM;  Location: [facility];  Service: Podiatry     UTERINE FIBROID SURGERY       Family History   Problem Relation Age of Onset    Hypertension Mother     Diabetes Mother     No Known Problems Father     No Known Problems Maternal Grandmother     No Known Problems Maternal Grandfather     No Known Problems Paternal Grandmother     No Known Problems Paternal Grandfather     No Known Problems Maternal Aunt     No Known Problems Maternal Aunt     No Known Problems Paternal Aunt     No Known Problems Paternal Aunt     No Known Problems Paternal Uncle     No Known Problems Paternal Uncle     No Known Problems Half-Sister     No Known Problems Half-Sister      Social History     Socioeconomic History    Marital status: /Civil Union     Spouse name: Not on file    Number of children: Not on file    Years of education: Not on file    Highest education level: Not on file   Occupational History    Not on file   Tobacco Use    Smoking status: Never    Smokeless tobacco: Never   Vaping Use    Vaping status: Never Used   Substance and Sexual Activity    Alcohol use: No    Drug use: No    Sexual activity: Not on file   Other Topics Concern    Not on file   Social History Narrative    Lives with boyfriend     [employer] [facility]      Social Drivers of Health     Financial Resource Strain: Not on file   Food Insecurity: Patient Declined (3/24/2025)    Hunger Vital Sign     Worried About Running Out of Food in the Last Year: Patient declined     Ran Out of Food in the Last Year: Patient declined   Transportation Needs: Patient Declined (3/24/2025)    PRAPARE - Transportation     Lack of Transportation (Medical): Patient declined     Lack of Transportation (Non-Medical): Patient declined   Physical Activity: Not on file   Stress: Not on file   Social Connections: Not on file   Intimate Partner Violence: Not on file   Housing Stability: Patient Declined (3/24/2025)    Housing Stability Vital Sign     Unable to Pay for Housing in the Last Year: Patient declined      "Number of Times Moved in the Last Year: 1     Homeless in the Last Year: Patient declined     Past Surgical History:   Procedure Laterality Date    GASTRECTOMY SLEEVE LAPAROSCOPIC      MI ARTHRODESIS GREAT TOE METATARSOPHALANGEAL JOINT Right 7/22/2022    Procedure: ARTHRODESIS / FUSION FOOT;  Surgeon: Yan Hayden DPM;  Location: [facility];  Service: Podiatry    UTERINE FIBROID SURGERY         Review of Systems:  Review of Systems   Constitutional:  Negative for activity change, appetite change, chills and fatigue.   HENT:  Positive for tinnitus (occasional). Negative for congestion and hearing loss.    Eyes:  Negative for visual disturbance.   Respiratory:  Negative for cough and shortness of breath.    Cardiovascular:  Negative for chest pain.   Gastrointestinal:  Negative for abdominal pain, constipation, diarrhea, nausea and vomiting.   Genitourinary:  Negative for difficulty urinating.   Musculoskeletal:  Positive for arthralgias and back pain. Negative for gait problem.   Neurological:  Positive for dizziness (occasional). Negative for light-headedness.   Psychiatric/Behavioral:  Positive for decreased concentration (forgetful). Negative for dysphoric mood and sleep disturbance. The patient is not nervous/anxious.        OBJECTIVE:  Vitals:    04/10/25 0914   BP: 118/72   BP Location: Right arm   Patient Position: Sitting   Cuff Size: Standard   Pulse: 80   Temp: 98 °F (36.7 °C)   TempSrc: Temporal   SpO2: 99%   Weight: 85.2 kg (187 lb 12.8 oz)   Height: 5' 4\" (1.626 m)     Body mass index is 32.24 kg/m².  Physical Exam  Vitals and nursing note reviewed.   Constitutional:       General: She is not in acute distress.     Appearance: Normal appearance. She is well-developed. She is not diaphoretic.   HENT:      Head: Normocephalic.   Cardiovascular:      Rate and Rhythm: Normal rate.   Pulmonary:      Effort: Pulmonary effort is normal. No respiratory distress.      Breath sounds: No wheezing. "   Abdominal:      General: Bowel sounds are normal. There is no distension.      Palpations: Abdomen is soft.      Tenderness: There is no abdominal tenderness.   Musculoskeletal:         General: Normal range of motion.   Skin:     General: Skin is warm and dry.   Neurological:      General: No focal deficit present.      Mental Status: She is alert and oriented to person, place, and time. Mental status is at baseline.   Psychiatric:         Mood and Affect: Mood normal.         Behavior: Behavior normal.         MoCA: 20/30 - deficits in all domains  GDS: 0/15    Labs & Imaging:  Lab Results   Component Value Date    WBC 8.10 02/06/2025    HGB 13.8 02/06/2025    HCT 42.9 02/06/2025    MCV 99 (H) 02/06/2025     02/06/2025     Lab Results   Component Value Date    SODIUM 139 02/06/2025    K 3.5 02/06/2025     02/06/2025    CO2 26 02/06/2025    AGAP 9 02/06/2025    BUN 16 02/06/2025    CREATININE 1.13 02/06/2025    GLUC 109 02/06/2025    GLUF 83 07/01/2022    CALCIUM 9.8 02/06/2025    AST 15 02/06/2025    ALT 12 04/01/2025    ALKPHOS 100 02/06/2025    TP 7.6 02/06/2025    TBILI 0.82 02/06/2025    EGFR 50 02/06/2025     Lab Results   Component Value Date    HGBA1C 4.9 03/24/2025     Lab Results   Component Value Date    CHOLESTEROL 201 (H) 03/24/2025    CHOLESTEROL 177 06/04/2024    CHOLESTEROL 186 07/01/2022     Lab Results   Component Value Date    HDL 67 03/24/2025    HDL 65 06/04/2024    HDL 75 07/01/2022     Lab Results   Component Value Date    TRIG 132 03/24/2025    TRIG 116 06/04/2024    TRIG 68 07/01/2022     Lab Results   Component Value Date    NONHDLC 112 06/04/2024    NONHDLC 111 07/01/2022    NONHDLC 144 09/08/2020     Lab Results   Component Value Date    LDLCALC 108 (H) 03/24/2025    LDLCALC 89 06/04/2024     Lab Results   Component Value Date    HWLPAPAH38 98 (L) 06/04/2024     Lab Results   Component Value Date    [id number] 1.660 02/06/2025     Lab Results   Component Value Date     "SYPHILISAB Non-reactive 12/04/2024     No results found for: \"RPR\"      Lab Results   Component Value Date    [id number] 21.9 (L) 06/04/2024      Results for orders placed during the hospital encounter of 11/24/24    CT head without contrast    Narrative  CT BRAIN - WITHOUT CONTRAST    INDICATION:   dizziness.    COMPARISON: CT head 9/25/2023    TECHNIQUE:  CT examination of the brain was performed.  Multiplanar 2D reformatted images were created from the source data.    Radiation dose length product (DLP) for this visit:  792 mGy-cm .  This examination, like all CT scans performed in the [facility], was performed utilizing techniques to minimize radiation dose exposure, including the use of iterative  reconstruction and automated exposure control.    IMAGE QUALITY:  Diagnostic.    FINDINGS:    PARENCHYMA:No intracranial mass, mass effect or midline shift. No CT signs of acute infarction.  No acute parenchymal hemorrhage.    VENTRICLES AND EXTRA-AXIAL SPACES:  Normal for the patient's age.    VISUALIZED ORBITS:  Normal visualized orbits.    PARANASAL SINUSES:  Trace mucosal thickening scattered in the paranasal sinuses.    CALVARIUM AND EXTRACRANIAL SOFT TISSUES:  Normal.    Impression  No acute intracranial abnormality.      I have spent 61 minutes with Patient and family today including taking history from family, patient, review of records, charting, and counseling regarding diagnosis and management of conditions.      "

## 2025-04-14 ENCOUNTER — EVALUATION (OUTPATIENT)
Dept: PHYSICAL THERAPY | Facility: CLINIC | Age: 70
End: 2025-04-14
Payer: COMMERCIAL

## 2025-04-14 DIAGNOSIS — H81.11 BENIGN PAROXYSMAL POSITIONAL VERTIGO OF RIGHT EAR: Primary | ICD-10-CM

## 2025-04-14 PROCEDURE — 97112 NEUROMUSCULAR REEDUCATION: CPT

## 2025-04-14 PROCEDURE — 97161 PT EVAL LOW COMPLEX 20 MIN: CPT

## 2025-04-14 NOTE — PROGRESS NOTES
"PT Evaluation     Today's date: 2025  Patient name: Yasmeen Castle  : 1955  MRN: [id number]  Referring provider: Geovanni Perez PA-C  Dx:   Encounter Diagnosis     ICD-10-CM    1. Benign paroxysmal positional vertigo of right ear  H81.11           Start Time: 1045  Stop Time: 1130  Total time in clinic (min): 45 minutes    Assessment  Impairments: lacks appropriate home exercise program  Other impairment: Dizziness with laying in bed  Symptom irritability: low    Assessment details: Yasmeen Castle is a 70 y.o. female presenting to physical therapy for an initial evaluation with a MD referral of BPPV. The patient reports that her chief complaints of left ear tinnitus which has been on going for approximately one year when she fell and hit her head. She notes that these symptoms are intermittent in nature, worse with loud noises, yelling/loud talking, or when she \"gets upset\". She denied dizziness with bed mobility or ambulation. Completed VOMS and cervical screening which was unremarkable. Positional testing however revealed positive right [place] Hallpike with torsional up beating nystagmus and reports of \"room spinning\" dizziness. Performed Epley Maneuver 3x this visit. The patient's symptoms are consistent with right posterior canal BPPV. The patient demonstrates the following functional limitations: dizziness with laying in bed. This patient would benefit from OP PT services to address their impairments and functional limitations to maximize functional mobility. Educated patient on follow up with PCP and/or ENT due to her continued tinnitus as this is her largest concern at this time.   Understanding of Dx/Px/POC: good     Prognosis: good    Goals  STG to be achieved in 4 weeks:  1. The patient will report 50% reduction of dizziness to allow improved tolerance to completing functional activities.  2. The patient will be able to lay supine without symptom reproduction.    LTG to be achieved by DC:   1. " "The patient will report resolution of dizziness symptoms to allow for return to PLOF and completion of functional activities.   2. The patient will be able to complete all bed mobility without symptom reproduction.     Plan  Patient would benefit from: skilled physical therapy    Planned therapy interventions: balance/weight bearing training, manual therapy, neuromuscular re-education, patient education, canalith repositioning, postural training, therapeutic exercise, therapeutic activities, gait training and home exercise program    Frequency: 1-2x week  Duration in weeks: 4  Plan of Care beginning date: 4/14/2025  Plan of Care expiration date: 5/12/2025  Treatment plan discussed with: patient        Subjective Evaluation    History of Present Illness  Mechanism of injury: Yasmeen presents with chief complaints of tinnitus. She reports that she began experiencing this approximately one year ago when she fell and hit her head. She describes it as a \"ringing or bees buzzing\" noise. She states that over the past 3 months or so this has worsened. She notes that there are times where this is not occurring, but it typically comes back with loud noises, if she gets upset, or yells/talks loudly.     She was previously here for BPPV in March at which time her symptoms resolved and she was DC from PT services. Per chart review, she did see her PCP on 4/1/25 at which time she mentioned that the vertigo had returned and she was therefore was referred back to PT. She states that she has not been dizzy with laying down or rolling over in bed as she had been previously, therefore she is unsure why she was referred back to PT. She denies visual disturbances (blurry or double), dysphagia, dysarthria, ataxia, nausea/vomiting, numbness/tingling, nor HA.   Patient Goals  Patient goal: resolve the ringing in ears  Pain  No pain reported    Social Support    Employment status: working ([email or web address])  Treatments  Previous " treatment: medication  Current treatment: physical therapy        Objective     Concurrent Complaints  Positive for tinnitus and hearing loss. Negative for nausea/motion sickness and visual change    Active Range of Motion   Cervical/Thoracic Spine       Cervical    Flexion: Neck active flexion: WFL.   Extension: Neck active extension: WFL.      Left lateral flexion: Neck active lateral bend left: WFL.      Right lateral flexion: Neck active lateral bend right: WFL.      Left rotation: Neck active rotation left: WFL.  Right rotation: Neck active rotation right: WFL.       Ambulation   Weight-Bearing Status   Assistive device used: none    Ambulation: Level Surfaces   Ambulation without assistive device: independent    Observational Gait   Gait: within functional limits   Neuro Exam:     Cervical exam   Ligament Laxity Testing   Alar ligament: WNL  Sharp Jennifer: WNL  Modified VBI   Left: asymptomatic  Right: asymptomatic    Oculomotor exam   Oculomotor ROM: WNL  Resting nystagmus: not present   Gaze holding nystagmus: present left and present right  Smooth pursuits: within normal limits  Vertical saccades: normal  Horizontal saccades: normal  Convergence: normal  Head thrust: left normal and right normal    Positional testing   Saundra-Hallpike   Left posterior canal: WNL  Right posterior canal: symptomatic, torsional and upbeating      Vestibular/Ocular-Motor Screening (VOMS):      Headache Dizziness Nausea Fogginess Comments   Baseline symptoms 0/10 0/10 0/10 0/10    Smooth pursuits 0/10 0/10 0/10 0/10    Saccades - horizontal 0/10 0/10 0/10 0/10    Saccades - vertical 0/10 0/10 0/10 0/10    Convergence (near point) 0/10 0/10 0/10 0/10         Trial 1 2 cm   VOR- horizontal 0/10 0/10 0/10 0/10    VOR- vertical 0/10 0/10 0/10 0/10    Visual Motion Sensitivity Test 0/10 0/10 0/10 0/10           Precautions: HTN    POC expires Unit limit Auth  expiration date PT/OT + Visit Limit?   5/12/25 N/A 12/31/25 Pat-BOM      Sec-25  per cly      Used 3     Visit/Unit Tracking  AUTH Status:  Date 4/14              22 remaining Used 4               Remaining  21                 Manuals 4/14            R [place] Hallpike 3x (+)            R Epley Maneuver 3x                                      Neuro Re-Ed                          Pt education PT POC, tinnitus follow up PCP vs ENT                                                                             Ther Ex                                                                                                                     Ther Activity                                       Gait Training                                       Modalities

## 2025-04-18 ENCOUNTER — OFFICE VISIT (OUTPATIENT)
Dept: PHYSICAL THERAPY | Facility: CLINIC | Age: 70
End: 2025-04-18
Payer: MEDICARE

## 2025-04-18 DIAGNOSIS — H81.11 BENIGN PAROXYSMAL POSITIONAL VERTIGO OF RIGHT EAR: Primary | ICD-10-CM

## 2025-04-18 PROCEDURE — 97140 MANUAL THERAPY 1/> REGIONS: CPT

## 2025-04-18 NOTE — PROGRESS NOTES
Daily Note     Today's date: 2025  Patient name: Yasmeen Castle  : 1955  MRN: [id number]  Referring provider: Geovanni Perez PA-C  Dx:   Encounter Diagnosis     ICD-10-CM    1. Benign paroxysmal positional vertigo of right ear  H81.11                      Subjective: Patient reports that she has not been dizzy with rolling in bed since she was last here.       Objective: See treatment diary below      Assessment: Tolerated treatment well. Patient tested negative for bilateral Saundra Hallpike and supine roll testing this visit indicating resolved BPPV. Educated on follow up with PCP vs ENT due to tinnitus concerns. Advised on 30 day hold on chart in case of symptom return; verbalized understanding.       Plan: Hold 30 days in case of BPPV symptoms return for check up.      Precautions: HTN    POC expires Unit limit Auth  expiration date PT/OT + Visit Limit?   25 N/A 25 Pat-BOM      Sec-25 per cly      Used 3     Visit/Unit Tracking  AUTH Status:  Date              22 remaining Used 4 5              Remaining  21 20                Manuals            R [place] Hallpike 3x (+) 3x (-)  and L 3x (-)           R Epley Maneuver 3x            Supine roll test B/L  3x (-)                        Neuro Re-Ed                          Pt education PT POC, tinnitus follow up PCP vs ENT                                                                             Ther Ex                                                                                                                     Ther Activity                                       Gait Training                                       Modalities

## 2025-04-21 ENCOUNTER — OFFICE VISIT (OUTPATIENT)
Dept: FAMILY MEDICINE CLINIC | Facility: CLINIC | Age: 70
End: 2025-04-21
Payer: MEDICARE

## 2025-04-21 VITALS
BODY MASS INDEX: 32.1 KG/M2 | SYSTOLIC BLOOD PRESSURE: 146 MMHG | OXYGEN SATURATION: 97 % | HEART RATE: 64 BPM | HEIGHT: 64 IN | DIASTOLIC BLOOD PRESSURE: 76 MMHG | WEIGHT: 188 LBS | TEMPERATURE: 98.6 F

## 2025-04-21 DIAGNOSIS — J03.90 TONSILLITIS: ICD-10-CM

## 2025-04-21 DIAGNOSIS — R42 DIZZINESS: Primary | ICD-10-CM

## 2025-04-21 DIAGNOSIS — H93.12 NEW ONSET TINNITUS OF LEFT EAR: ICD-10-CM

## 2025-04-21 PROCEDURE — G2211 COMPLEX E/M VISIT ADD ON: HCPCS | Performed by: NURSE PRACTITIONER

## 2025-04-21 PROCEDURE — 99213 OFFICE O/P EST LOW 20 MIN: CPT | Performed by: NURSE PRACTITIONER

## 2025-04-21 RX ORDER — AMOXICILLIN 875 MG/1
875 TABLET, COATED ORAL 2 TIMES DAILY
Qty: 14 TABLET | Refills: 0 | Status: SHIPPED | OUTPATIENT
Start: 2025-04-21 | End: 2025-04-28

## 2025-04-21 RX ORDER — IBUPROFEN 600 MG/1
600 TABLET, FILM COATED ORAL EVERY 4 HOURS PRN
COMMUNITY
Start: 2025-04-15

## 2025-04-21 NOTE — PROGRESS NOTES
Name: Yasmeen Castle      : 1955      MRN: [id number]  Encounter Provider: ROVERTO Alexander  Encounter Date: 2025   Encounter department: [facility]  :  Assessment & Plan  Dizziness  Taking the meclizine prn and is effective and had PT and was not effective          New onset tinnitus of left ear  Ongoing issue with tinnitus and has been to ENT and no interventions and recommend trying Lipoflavonoids to try to help the ringing          Tonsillitis    Orders:    amoxicillin (AMOXIL) 875 mg tablet; Take 1 tablet (875 mg total) by mouth 2 (two) times a day for 7 days  Patient with sore throat and white patches on tonsils and pain with palpation will cover for strep.          History of Present Illness   Patient here today and reports that she has been taking the Meclizine for her dizziness and has been helpful but is still struggling with the tinnitus in the left ear and sometimes feeling a sharp pin in the ear. Patient is taking the Meclizine and doing PT and no longer having dizziness. She has a visit coming up on  for her neuropsychiatry testing and then follow up with geriatrics coming up. Patient also having a sore throat and tonsil pain and swelling and having white patches on her tonsils       Review of Systems   Constitutional:  Negative for activity change, appetite change, chills, diaphoresis, fatigue, fever and unexpected weight change.   HENT:  Positive for sore throat and tinnitus. Negative for congestion, ear pain, hearing loss, postnasal drip, sinus pressure, sinus pain and sneezing.    Eyes:  Negative for pain, redness and visual disturbance.   Respiratory:  Negative for cough and shortness of breath.    Cardiovascular:  Negative for chest pain and leg swelling.   Gastrointestinal:  Negative for abdominal pain, diarrhea, nausea and vomiting.   Endocrine: Negative.    Genitourinary: Negative.    Musculoskeletal:  Negative for arthralgias.   Skin:  "Negative.    Allergic/Immunologic: Negative.    Neurological:  Negative for dizziness and light-headedness.   Hematological: Negative.    Psychiatric/Behavioral:  Negative for behavioral problems and dysphoric mood.        Objective   /76   Pulse 64   Temp 98.6 °F (37 °C)   Ht 5' 4\" (1.626 m)   Wt 85.3 kg (188 lb)   SpO2 97%   BMI 32.27 kg/m²      Physical Exam  Constitutional:       General: She is not in acute distress.     Appearance: She is well-developed.   HENT:      Head: Normocephalic and atraumatic.      Mouth/Throat:        Comments: Geographic tongue   Eyes:      Pupils: Pupils are equal, round, and reactive to light.   Neck:      Thyroid: No thyromegaly.   Cardiovascular:      Rate and Rhythm: Normal rate and regular rhythm.      Heart sounds: Normal heart sounds. No murmur heard.  Pulmonary:      Effort: Pulmonary effort is normal. No respiratory distress.      Breath sounds: Normal breath sounds. No wheezing.   Abdominal:      General: Bowel sounds are normal.      Palpations: Abdomen is soft.   Musculoskeletal:         General: Normal range of motion.      Cervical back: Normal range of motion.   Skin:     General: Skin is warm and dry.   Neurological:      Mental Status: She is alert and oriented to person, place, and time.         "

## 2025-04-21 NOTE — ASSESSMENT & PLAN NOTE
Orders:    amoxicillin (AMOXIL) 875 mg tablet; Take 1 tablet (875 mg total) by mouth 2 (two) times a day for 7 days  Patient with sore throat and white patches on tonsils and pain with palpation will cover for strep.

## 2025-04-21 NOTE — ASSESSMENT & PLAN NOTE
Ongoing issue with tinnitus and has been to ENT and no interventions and recommend trying Lipoflavonoids to try to help the ringing

## 2025-04-23 ENCOUNTER — EVALUATION (OUTPATIENT)
Age: 70
End: 2025-04-23
Attending: NURSE PRACTITIONER
Payer: MEDICARE

## 2025-04-23 DIAGNOSIS — R41.3 MEMORY LOSS, SHORT TERM: ICD-10-CM

## 2025-04-23 DIAGNOSIS — R48.8 OTHER SYMBOLIC DYSFUNCTIONS: ICD-10-CM

## 2025-04-23 DIAGNOSIS — R41.841 COGNITIVE COMMUNICATION DEFICIT: Primary | ICD-10-CM

## 2025-04-23 PROCEDURE — 96125 COGNITIVE TEST BY HC PRO: CPT

## 2025-04-23 NOTE — PROGRESS NOTES
Speech-Language Pathology Initial Evaluation    Today's date: 2025   Patient’s name: Yasmeen Castle  : 1955  MRN: [id number]  Safety measures: HTN, GERD, memory issues, tinnitus  Referring provider: Sandra Jacobs CRNP    Encounter Diagnosis     ICD-10-CM    1. Cognitive communication deficit  R41.841       2. Other symbolic dysfunctions  R48.8       3. Memory loss, short term  R41.3 Ambulatory Referral to Speech Therapy          Assessment:  Patient presents with moderate cognitive-linguistic impairment c/b weaknesses with attention, immediate and delayed memory, insight/awareness of deficits/difficulties/challenges visuospatial/constructional skills, and verbal/semantic fluency, with mild lexical retrieval difficulties noted during structured tasks as well as spontaneous discourse. It is suspected that the patient has some executive dysfunction 2/2 deficits with attention and memory, which are foundational skills for higher level cognitive skills such as sequencing, organizing, problem solving, etc. The patient completed a cognitive checklist in which she indicated that she is experiencing the following symptoms in the designated areas: Memory: Remembering peoples' names Communication: Expressing thoughts and ideas fluently receiving a total score of 2/37. Pt was administered RBANs examination form B this date. Pt scored a rating of extremely low in the area of immediate memory recall, low average in the area of visuospatial/constructional, average for language function, low average for attention to task, and rating extremely low in the area of delayed memory recall. Pt received a score of borderline in the overall summation of index scores. Patient would benefit from OP skilled ST services to target increased functional recall, improve executive functioning skills (e.g. processing, problem-solving, thought organization, etc.), increase verbal fluency, and receive education on word-finding & memory  aids/strategies, in order to experience successful completion of daily tasks, follow directions within functional activities and unstructured tasks, support positive communication interactions with both familiar and unfamiliar listeners, promote safety, and facilitate overall improved quality of life.    Short-term goals:  Patient will be educated on the use of internal and external memory aids and compensatory strategies to facilitate increased recall of routine, personal information, and recent events, to be achieved in 2-4 weeks.  Patient will be educated on and demonstrate understanding of word finding strategies (i.e., circumlocution) for improved generative naming and verbal expression skills, to be achieved in 2-4 weeks.  Patient will utilize word finding strategies during semantic feature analysis treatment activity, with 80% accuracy, IND, in 3 out of 4 consecutive sessions, to facilitate improved naming and verbal expression skills, to be achieved in 10-12 weeks.  Patient will complete auditory immediate and short term memory tasks to facilitate increased ability to retell narratives and recall information within functional living environment, with 80% accuracy, IND using preferred memory strategy, in 3 out of 4 consecutive sessions, to be achieved in 10-12 weeks.  Patient will complete complex auditory attention processing tasks (e.g., sentence unscramble, ranking numbers/words, etc.) to improve working memory, with 80% accuracy, IND, in 3 out of 4 consecutive sessions, to be achieved in 10-12 weeks.  Patient will complete thought organization tasks (e.g., sequencing, deduction puzzles, etc.) with 80% accuracy, IND, in 3 out of 4 consecutive sessions, to facilitate increased executive functioning, working memory, problem solving, and processing skills, to be achieved in 10-12 weeks.  Patient will complete concrete and abstract categorization tasks to 80% accuracy, IND, in 3 out of 4 consecutive sessions,  to facilitate improved generative naming skills and working memory, to be achieved in 10-12 weeks.  Patient will complete higher-level expressive language tasks (e.g., word definitions, idioms, synonym/antonyms, etc) with 80% accuracy, IND, in 3 out of 4 consecutive sessions, to improve functional communication skills, to be achieved in 10-12 weeks.  To target mental manipulation and working memory, patient will participate in word finding activity (i.e., anagrams) with 80% accuracy, IND, in 3 out of 4 consecutive sessions, to be achieved in 10-12 weeks.   Patient will answer questions regarding story read aloud with 80% accuracy, IND, in 3 out of 4 consecutive sessions, to facilitate improved auditory comprehension and recall, to be achieved in 10-12 weeks.   Patient will complete attention tasks (divided, auditory, alternating, sustained) by responding to multiple tasks or details within tasks at the same time in a distracting environment, with 80% accuracy, given min cues, in 3 out of 4 consecutive sessions, to improve attention and working memory, to be achieved in 10-12 weeks.      Long-term goals:  Patient will complete cognitive-linguistic therapy that addresses patient's specific deficits in processing speed, short-term working memory, attention to detail, monitoring, sequencing, and organization skills, with instruction, to alleviate effects of executive functioning disorder deficits by discharge.  Patient will improve ability to facilitate cognitive function and communication skills, including use of compensatory strategies in a variety of functional living tasks, to improve quality of life and to maximize level of independence.          Plan:  Patient would benefit from outpatient skilled Speech Therapy services: Cognitive-linguistic therapy    Frequency: 1-2x weekly  Duration: 3 months    Intervention certification from: 4/23/2025  Intervention certification to: 7/23/2025      Subjective:  History of  "present illness: Patient is a 70 y.o. female who was referred to outpatient skilled Speech Therapy services for a cognitive-linguistic evaluation. PMH remarkable for HTN, nasal congestion, tinnitus, chronic GERD, trochanteric bursitis, hx of ear problems w/occasional pain, and hx of R bunion.       Patient's goal(s): \"To stop the ringing (tinnitus).\" Patient educated on tinnitus, diagnosis, prognosis, and tx, as well as cognitive-linguistic challenges and what tx involves.     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 The RBANS is intended for use with adolescents to adults, ages 12 to 89 years. The following results were obtained during the administration of the assessment.    Form: B    Cognitive Domain/Subtest: Index Score: Percentile Rank: Classification:   IMMEDIATE MEMORY 69 2%ile Extremely Low        1. List Learning (21/40)        2. Story Memory (11/24)       VISUOSPATIAL/  CONSTRUCTIONAL 87 19%ile Low Average        3. Figure Copy (19/20)        4. Line Orientation (11/20)       LANGUAGE 90 25%ile Average        5. Picture Naming (10/10)        6. Semantic Fluency (15/40)       ATTENTION 82 12%ile Low Average        7. Digit Span (9/16)        8. Coding (33/89)       DELAYED MEMORY 68 2%ile Extremely Low        9. List Recall (3/10)        10. List Recognition (16/20)        11. Story Recall (5/12)        12. Figure Recall (9/20)         Sum of Index Scores:  396   Total Score:  74 "   Percentile: 4%ile   Classification: Borderline       *Patient named 11 concrete category members (zoo animals) in 60 sec (norm=15+). -- BELOW AVERAGE    *Patient named 2 abstract category members (words beginning with letter 'D') in 60 sec (norm=10+). -- BELOW AVERAGE            Visit Tracking:  POC expires Unit limit Auth Expiration date PT/OT + Visit Limit?   7/23/25 N/A 12/31/25 BOMN PCY                           Visit/Unit Tracking  AUTH Status:  Date 4/23  IE              No Used 1               Remaining  9                        Intervention Comments:  - 55 minutes standard cognitive-linguistic evaluation   - 60 minutes scoring, report write-up, and establishment of POC

## 2025-05-05 ENCOUNTER — TELEPHONE (OUTPATIENT)
Age: 70
End: 2025-05-05

## 2025-05-05 NOTE — TELEPHONE ENCOUNTER
LVM to inform patient of D/C 2/2 noncompliance with attendance policy. Recommended pt f/u with PCP/referring provider for a new script for eval and tx if she would like to return to be seen for services.

## 2025-05-20 ENCOUNTER — HOSPITAL ENCOUNTER (OUTPATIENT)
Dept: RADIOLOGY | Facility: MEDICAL CENTER | Age: 70
Discharge: HOME | End: 2025-05-20
Payer: MEDICARE

## 2025-05-20 VITALS — HEIGHT: 64 IN | BODY MASS INDEX: 32.27 KG/M2

## 2025-05-20 DIAGNOSIS — Z12.31 ENCOUNTER FOR SCREENING MAMMOGRAM FOR MALIGNANT NEOPLASM OF BREAST: ICD-10-CM

## 2025-05-20 PROCEDURE — 77063 BREAST TOMOSYNTHESIS BI: CPT

## 2025-05-20 PROCEDURE — 77067 SCR MAMMO BI INCL CAD: CPT

## 2025-05-21 PROBLEM — J03.90 TONSILLITIS: Status: RESOLVED | Noted: 2025-04-21 | Resolved: 2025-05-21

## 2025-05-26 ENCOUNTER — RESULTS FOLLOW-UP (OUTPATIENT)
Dept: FAMILY MEDICINE CLINIC | Facility: CLINIC | Age: 70
End: 2025-05-26

## 2025-06-02 ENCOUNTER — OFFICE VISIT (OUTPATIENT)
Dept: FAMILY MEDICINE CLINIC | Facility: CLINIC | Age: 70
End: 2025-06-02
Payer: MEDICARE

## 2025-06-02 ENCOUNTER — TELEPHONE (OUTPATIENT)
Dept: FAMILY MEDICINE CLINIC | Facility: CLINIC | Age: 70
End: 2025-06-02

## 2025-06-02 VITALS
TEMPERATURE: 97.9 F | SYSTOLIC BLOOD PRESSURE: 138 MMHG | BODY MASS INDEX: 32.61 KG/M2 | WEIGHT: 191 LBS | OXYGEN SATURATION: 96 % | HEIGHT: 64 IN | HEART RATE: 70 BPM | DIASTOLIC BLOOD PRESSURE: 84 MMHG

## 2025-06-02 DIAGNOSIS — I10 ESSENTIAL HYPERTENSION: ICD-10-CM

## 2025-06-02 DIAGNOSIS — E55.9 VITAMIN D DEFICIENCY: ICD-10-CM

## 2025-06-02 DIAGNOSIS — R41.3 MEMORY LOSS, SHORT TERM: ICD-10-CM

## 2025-06-02 DIAGNOSIS — Z13.220 SCREENING FOR LIPID DISORDERS: ICD-10-CM

## 2025-06-02 DIAGNOSIS — R41.841 COGNITIVE COMMUNICATION DEFICIT: Primary | ICD-10-CM

## 2025-06-02 DIAGNOSIS — L85.3 DRY SKIN DERMATITIS: ICD-10-CM

## 2025-06-02 DIAGNOSIS — E53.8 VITAMIN B 12 DEFICIENCY: ICD-10-CM

## 2025-06-02 PROCEDURE — 99213 OFFICE O/P EST LOW 20 MIN: CPT | Performed by: NURSE PRACTITIONER

## 2025-06-02 PROCEDURE — G2211 COMPLEX E/M VISIT ADD ON: HCPCS | Performed by: NURSE PRACTITIONER

## 2025-06-02 RX ORDER — IBUPROFEN 800 MG/1
TABLET, FILM COATED ORAL
COMMUNITY
Start: 2025-05-28

## 2025-06-02 RX ORDER — TRIAMCINOLONE ACETONIDE 5 MG/G
CREAM TOPICAL 3 TIMES DAILY
Qty: 15 G | Refills: 1 | Status: SHIPPED | OUTPATIENT
Start: 2025-06-02

## 2025-06-02 RX ORDER — AMOXICILLIN 500 MG/1
500 TABLET, FILM COATED ORAL 3 TIMES DAILY
COMMUNITY
Start: 2025-05-21

## 2025-06-02 NOTE — PROGRESS NOTES
Name: Yasmeen Castle      : 1955      MRN: [id number]  Encounter Provider: ROVERTO Alexander  Encounter Date: 2025   Encounter department: [facility]  :  Assessment & Plan  Cognitive communication deficit  Patient has decided with her  that she would like ot hold off on therpay at this point and is planning in the future for the vertigo   Orders:    Comprehensive metabolic panel; Future    CBC and differential; Future    Memory loss, short term  Patient denies any current symptoms   Orders:    Comprehensive metabolic panel; Future    CBC and differential; Future    Dry skin dermatitis    Orders:    triamcinolone (KENALOG) 0.5 % cream; Apply topically 3 (three) times a day    Essential hypertension  BP is well controlled     Orders:    Comprehensive metabolic panel; Future    Lipid Panel with Direct LDL reflex; Future    Vitamin D deficiency    Orders:    Vitamin D 25 hydroxy; Future    Vitamin B 12 deficiency    Orders:    Vitamin B12; Future    Screening for lipid disorders    Orders:    Hemoglobin A1C; Future           History of Present Illness   Patient has followed up with physical and OT for her vertigo and her short term memory loss and has completed therapy on the vertgio and is doing ok with the vertigo and is not having an issue currently and has small amount of tinnitus She has been using topical lotions for her dry skin not itchy and is asking for a cream to try. She also has some toenail thickening not fungal       Review of Systems   Constitutional:  Negative for activity change, appetite change, chills, diaphoresis, fatigue, fever and unexpected weight change.   HENT:  Negative for congestion, ear pain, hearing loss, postnasal drip, sinus pressure, sinus pain, sneezing and sore throat.    Eyes:  Negative for pain, redness and visual disturbance.   Respiratory:  Negative for cough and shortness of breath.    Cardiovascular:  Negative for chest pain  "and leg swelling.   Gastrointestinal:  Negative for abdominal pain, diarrhea, nausea and vomiting.   Endocrine: Negative.    Genitourinary: Negative.    Musculoskeletal:  Negative for arthralgias.   Skin:  Positive for color change.   Allergic/Immunologic: Negative.    Neurological:  Positive for dizziness. Negative for light-headedness.   Hematological: Negative.    Psychiatric/Behavioral:  Negative for behavioral problems and dysphoric mood.        Objective   /84 (BP Location: Left arm, Patient Position: Sitting, Cuff Size: Large)   Pulse 70   Temp 97.9 °F (36.6 °C)   Ht 5' 4\" (1.626 m)   Wt 86.6 kg (191 lb)   SpO2 96%   BMI 32.79 kg/m²      Physical Exam  Constitutional:       General: She is not in acute distress.     Appearance: She is well-developed.   HENT:      Head: Normocephalic and atraumatic.     Eyes:      Pupils: Pupils are equal, round, and reactive to light.     Neck:      Thyroid: No thyromegaly.     Cardiovascular:      Rate and Rhythm: Normal rate and regular rhythm.      Heart sounds: Normal heart sounds. No murmur heard.  Pulmonary:      [place]: Pulmonary effort is normal. No respiratory distress.      Breath sounds: Normal breath sounds. No wheezing.   Abdominal:      General: Bowel sounds are normal.      Palpations: Abdomen is soft.     Musculoskeletal:         General: Normal range of motion.      Cervical back: Normal range of motion.     Skin:     General: Skin is warm and dry.     Neurological:      Mental Status: She is alert and oriented to person, place, and time.         "

## 2025-06-02 NOTE — ASSESSMENT & PLAN NOTE
Patient has decided with her  that she would like ot hold off on therpay at this point and is planning in the future for the vertigo   Orders:    Comprehensive metabolic panel; Future    CBC and differential; Future

## 2025-06-02 NOTE — ASSESSMENT & PLAN NOTE
BP is well controlled     Orders:    Comprehensive metabolic panel; Future    Lipid Panel with Direct LDL reflex; Future

## 2025-06-02 NOTE — ASSESSMENT & PLAN NOTE
Patient denies any current symptoms   Orders:    Comprehensive metabolic panel; Future    CBC and differential; Future

## 2025-06-02 NOTE — TELEPHONE ENCOUNTER
Pt stopped back in office after check out and wanted to know if you were going to put medication in for sore throat. Advise

## 2025-06-03 DIAGNOSIS — H81.10 BENIGN PAROXYSMAL POSITIONAL VERTIGO, UNSPECIFIED LATERALITY: ICD-10-CM

## 2025-06-03 RX ORDER — MECLIZINE HYDROCHLORIDE 25 MG/1
25 TABLET ORAL 3 TIMES DAILY PRN
Qty: 30 TABLET | Refills: 0 | Status: SHIPPED | OUTPATIENT
Start: 2025-06-03

## 2025-06-03 NOTE — TELEPHONE ENCOUNTER
Reason for call:   [x] Refill   [] Prior Auth  [] Other:     Office:   [x] PCP/Provider -   [] Specialty/Provider -     Medication: meclizine (ANTIVERT) 25 mg     Dose/Frequency:  Take 1 tablet (25 mg total) by mouth 3 (three) times a day as needed     Quantity: 30    Pharmacy: [facility]/pharmacy #[id number] - [place], [place] - [address]     Local Pharmacy   Does the patient have enough for 3 days?   [] Yes   [x] No - Send as HP to POD    Mail Away Pharmacy   Does the patient have enough for 10 days?   [] Yes   [] No - Send as HP to POD

## 2025-06-04 ENCOUNTER — APPOINTMENT (EMERGENCY)
Dept: CT IMAGING | Facility: HOSPITAL | Age: 70
End: 2025-06-04
Payer: COMMERCIAL

## 2025-06-04 ENCOUNTER — HOSPITAL ENCOUNTER (EMERGENCY)
Facility: HOSPITAL | Age: 70
Discharge: HOME | End: 2025-06-04
Attending: EMERGENCY MEDICINE
Payer: COMMERCIAL

## 2025-06-04 VITALS
HEART RATE: 79 BPM | TEMPERATURE: 97.9 F | RESPIRATION RATE: 19 BRPM | OXYGEN SATURATION: 97 % | DIASTOLIC BLOOD PRESSURE: 63 MMHG | SYSTOLIC BLOOD PRESSURE: 132 MMHG

## 2025-06-04 DIAGNOSIS — M54.9 BACK PAIN: ICD-10-CM
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
## 2025-06-04 DIAGNOSIS — R93.89 ABNORMAL CAT SCAN: ICD-10-CM

## 2025-06-04 PROCEDURE — 72131 CT LUMBAR SPINE W/O DYE: CPT

## 2025-06-04 PROCEDURE — 99284 EMERGENCY DEPT VISIT MOD MDM: CPT | Performed by: EMERGENCY MEDICINE

## 2025-06-04 PROCEDURE — 99284 EMERGENCY DEPT VISIT MOD MDM: CPT

## 2025-06-04 NOTE — ED PROVIDER NOTES
Time reflects when diagnosis was documented in both MDM as applicable and the Disposition within this note       Time User Action Codes Description Comment    6/4/2025  4:29 PM SlaughterJamal Add [W19.XXXA] Fall, initial encounter     6/4/2025  4:29 PM SlaughterJamal Add [M54.9] Back pain     6/4/2025  4:29 PM SlaughterJamal Add [R93.89] Abnormal CAT scan           ED Disposition       ED Disposition   Discharge    Condition   Stable    Date/Time   Wed Jun 4, 2025  4:29 PM    Comment   Yasmeen Castle discharge to home/self care.                   Assessment & Plan       Medical Decision Making  Problems Addressed:  Abnormal CAT scan:     Details: Discussed results and ddx with pt, aware of need for f/u MRI, given copy of ct results by me at time of discharge.   Back pain: complicated acute illness or injury     Details: Ddx includes fracture, sprain, strain, contusion, spinal cord injury, etc.   Fall, initial encounter: complicated acute illness or injury    Amount and/or Complexity of Data Reviewed  Radiology: ordered.             Medications - No data to display    ED Risk Strat Scores                    No data recorded        SBIRT 20yo+      Flowsheet Row Most Recent Value   Initial Alcohol Screen: US AUDIT-C     1. How often do you have a drink containing alcohol? 0 Filed at: 06/04/2025 1451   2. How many drinks containing alcohol do you have on a typical day you are drinking?  0 Filed at: 06/04/2025 1451   3a. Male UNDER 65: How often do you have five or more drinks on one occasion? 0 Filed at: 06/04/2025 1451   3b. FEMALE Any Age, or MALE 65+: How often do you have 4 or more drinks on one occassion? 0 Filed at: 06/04/2025 1451   Audit-C Score 0 Filed at: 06/04/2025 1451   JACOB: How many times in the past year have you...    Used an illegal drug or used a prescription medication for non-medical reasons? Never Filed at: 06/04/2025 1451                            History of Present Illness       Chief Complaint    Patient presents with    Fall     Pt. Had a fall Saturday and landed on her buttocks. Complaint of pain on her tailbone 7/10. Pt.states she has history of vertigo, denies LOC, not on BT.       Past Medical History[1]   Past Surgical History[2]   Family History[3]   Social History[4]   E-Cigarette/Vaping    E-Cigarette Use Never User       E-Cigarette/Vaping Substances    Nicotine No     THC No     CBD No     Flavoring No       I have reviewed and agree with the history as documented.     69 yo female with low back pain s/p multiple falls 3 days ago. States she was dizzy after running out of her vertigo medicine which caused her to fall 4 times landing on low back. No head strike, HA, neck pain, neuro complaints. C/o low back pain with palpation and turning and bending. No incontinence. No fever. Dizziness resolved since refilling her vertigo medicine 2 days ago. Does not want w/u for dizziness, states she knows why she was dizzy and fell.         Review of Systems   Musculoskeletal:  Positive for back pain. Negative for neck pain.   Neurological:  Positive for dizziness.           Objective       ED Triage Vitals [06/04/25 1452]   Temperature Pulse Blood Pressure Respirations SpO2 Patient Position - Orthostatic VS   97.9 °F (36.6 °C) 84 139/76 20 99 % Sitting      Temp Source Heart Rate Source BP Location FiO2 (%) Pain Score    Temporal -- Left arm -- --      Vitals      Date and Time Temp Pulse SpO2 Resp BP Pain Score FACES Pain Rating User   06/04/25 1545 -- 79 97 % 19 132/63 -- -- FB   06/04/25 1452 97.9 °F (36.6 °C) 84 99 % 20 139/76 -- -- GP            Physical Exam  Vitals and nursing note reviewed.   Constitutional:       General: She is not in acute distress.     Appearance: Normal appearance. She is well-developed. She is not ill-appearing, toxic-appearing or diaphoretic.   HENT:      Head: Normocephalic and atraumatic.      Mouth/Throat:      Mouth: Mucous membranes are moist.      Pharynx: Oropharynx is  clear.     Eyes:      Conjunctiva/sclera: Conjunctivae normal.      Pupils: Pupils are equal, round, and reactive to light.     Neck:      Vascular: No JVD.     Cardiovascular:      Rate and Rhythm: Normal rate and regular rhythm.      Pulses: Normal pulses.      Heart sounds: Normal heart sounds.   Pulmonary:      [place]: Pulmonary effort is normal. No respiratory distress.      Breath sounds: Normal breath sounds. No stridor.   Abdominal:      General: There is no distension.      Palpations: Abdomen is soft.      Tenderness: There is no abdominal tenderness. There is no guarding or rebound.     Musculoskeletal:         General: No tenderness or deformity. Normal range of motion.      Cervical back: Normal range of motion and neck supple. No rigidity.      Comments: L5 and sacrum ttp.      Skin:     General: Skin is warm and dry.      Capillary Refill: Capillary refill takes less than 2 seconds.      Coloration: Skin is not jaundiced or pale.      Findings: No bruising, erythema or rash.     Neurological:      General: No focal deficit present.      Mental Status: She is alert and oriented to person, place, and time.      Cranial Nerves: No cranial nerve deficit.      Sensory: No sensory deficit.      Motor: No weakness or abnormal muscle tone.      Coordination: Coordination normal.      Gait: Gait normal.         Results Reviewed       None            CT spine lumbar without contrast   Final Interpretation by Richard De La Torre MD (06/04 4176)      No acute osseous abnormality of lumbar spine.      T11-T12: Probable prominent left subarticular disc extrusion resulting in mild canal stenosis. Alternative consideration includes intradural/extramedullary mass such as meningioma or schwannoma. Consider follow-up MRI lumbar spine with and without    contrast to include all of T11 vertebral body.      Degenerative changes, as detailed above.      Additional chronic/incidental findings as detailed above.      The  study was marked in EPIC for immediate notification.      Workstation performed: [id number]             Procedures    ED Medication and Procedure Management   Prior to Admission Medications   Prescriptions Last Dose Informant Patient Reported? Taking?   Diclofenac Sodium (VOLTAREN) 1 %  Self No No   Sig: Apply 2 g topically 2 (two) times a day for 15 days   acetaminophen (TYLENOL) 500 mg tablet  Self Yes No   Sig: Take 500 mg by mouth every 6 (six) hours as needed for mild pain   albuterol (ProAir HFA) 90 mcg/act inhaler  Self No No   Sig: Inhale 2 puffs every 6 (six) hours as needed for wheezing   Patient not taking: Reported on 4/10/2025   amoxicillin (AMOXIL) 500 MG tablet   Yes No   Sig: Take 500 mg by mouth 3 (three) times a day   ergocalciferol (VITAMIN D2) 50,000 units  Self No No   Sig: Take 1 capsule (50,000 Units total) by mouth once a week   ibuprofen (MOTRIN) 800 mg tablet   Yes No   Sig: take 1 tablet by mouth every 6 to 8 hours if needed maximum daily dose of 3 tablets in 24 hours   meclizine (ANTIVERT) 25 mg tablet   No No   Sig: Take 1 tablet (25 mg total) by mouth 3 (three) times a day as needed for dizziness   ofloxacin (OCUFLOX) 0.3 % ophthalmic solution  Self No No   Sig: Administer 1 drop into the left eye 4 (four) times a day   prednisoLONE acetate (PRED FORTE) 1 % ophthalmic suspension  Self Yes No   Sig: instill 1 drop into each eye 2 times per day   triamcinolone (KENALOG) 0.5 % cream   No No   Sig: Apply topically 3 (three) times a day   vitamin B-12 (CYANOCOBALAMIN) 250 MCG TABS  Self No No   Sig: Take 1 tablet (250 mcg total) by mouth daily      Facility-Administered Medications: None     Discharge Medication List as of 6/4/2025  4:29 PM        CONTINUE these medications which have NOT CHANGED    Details   acetaminophen (TYLENOL) 500 mg tablet Take 500 mg by mouth every 6 (six) hours as needed for mild pain, Historical Med      albuterol (ProAir HFA) 90 mcg/act inhaler Inhale 2 puffs every  6 (six) hours as needed for wheezing, Starting Wed 10/2/2024, Normal      amoxicillin (AMOXIL) 500 MG tablet Take 500 mg by mouth 3 (three) times a day, Starting Wed 5/21/2025, Historical Med      Diclofenac Sodium (VOLTAREN) 1 % Apply 2 g topically 2 (two) times a day for 15 days, Starting Tue 6/4/2024, Until Thu 4/10/2025, Normal      ergocalciferol (VITAMIN D2) 50,000 units Take 1 capsule (50,000 Units total) by mouth once a week, Starting Mon 6/24/2024, Until Thu 4/10/2025, Normal      ibuprofen (MOTRIN) 800 mg tablet take 1 tablet by mouth every 6 to 8 hours if needed maximum daily dose of 3 tablets in 24 hours, Historical Med      meclizine (ANTIVERT) 25 mg tablet Take 1 tablet (25 mg total) by mouth 3 (three) times a day as needed for dizziness, Starting Tue 6/3/2025, Normal      ofloxacin (OCUFLOX) 0.3 % ophthalmic solution Administer 1 drop into the left eye 4 (four) times a day, Starting Mon 11/11/2024, Normal      prednisoLONE acetate (PRED FORTE) 1 % ophthalmic suspension instill 1 drop into each eye 2 times per day, Historical Med      triamcinolone (KENALOG) 0.5 % cream Apply topically 3 (three) times a day, Starting Mon 6/2/2025, Normal      vitamin B-12 (CYANOCOBALAMIN) 250 MCG TABS Take 1 tablet (250 mcg total) by mouth daily, Starting Thu 8/29/2024, Until Thu 4/10/2025, Normal           No discharge procedures on file.  ED SEPSIS DOCUMENTATION   Time reflects when diagnosis was documented in both MDM as applicable and the Disposition within this note       Time User Action Codes Description Comment    6/4/2025  4:29 PM Slaughter, Jamal Add [W19.XXXA] Fall, initial encounter     6/4/2025  4:29 PM Slaughter, Jamal Add [M54.9] Back pain     6/4/2025  4:29 PM Slaughter, Jamal Add [R93.89] Abnormal CAT scan                      [1]   Past Medical History:  Diagnosis Date    Bunion, right 05/06/2022    Chronic GERD 02/07/2018    Ear problems     occassiobnal pain    Hypertension     Nasal congestion     Tinnitus      Trochanteric bursitis 10/21/2019   [2]   Past Surgical History:  Procedure Laterality Date    GASTRECTOMY SLEEVE LAPAROSCOPIC      RI ARTHRODESIS GREAT TOE METATARSOPHALANGEAL JOINT Right 7/22/2022    Procedure: ARTHRODESIS / FUSION FOOT;  Surgeon: Yan Hayden DPM;  Location: [facility];  Service: Podiatry    UTERINE FIBROID SURGERY     [3]   Family History  Problem Relation Name Age of Onset    Hypertension Mother      Diabetes Mother      No Known Problems Father      No Known Problems Maternal Grandmother      No Known Problems Maternal Grandfather      No Known Problems Paternal Grandmother      No Known Problems Paternal Grandfather      No Known Problems Maternal Aunt      No Known Problems Maternal Aunt      No Known Problems Paternal Aunt      No Known Problems Paternal Aunt      No Known Problems Paternal Uncle      No Known Problems Paternal Uncle      No Known Problems Half-Sister      No Known Problems Half-Sister     [4]   Social History  Tobacco Use    Smoking status: Never    Smokeless tobacco: Never   Vaping Use    Vaping status: Never Used   Substance Use Topics    Alcohol use: No    Drug use: No        Jamal Slaughter MD  06/04/25 1939

## 2025-06-04 NOTE — DISCHARGE INSTRUCTIONS
No acute osseous abnormality of lumbar spine.     T11-T12: Probable prominent left subarticular disc extrusion resulting in mild canal stenosis. Alternative consideration includes intradural/extramedullary mass such as meningioma or schwannoma. Consider follow-up MRI lumbar spine with and without   contrast to include all of T11 vertebral body.

## 2025-06-05 DIAGNOSIS — H81.10 BENIGN PAROXYSMAL POSITIONAL VERTIGO, UNSPECIFIED LATERALITY: Primary | ICD-10-CM

## 2025-06-09 ENCOUNTER — OFFICE VISIT (OUTPATIENT)
Dept: FAMILY MEDICINE CLINIC | Facility: CLINIC | Age: 70
End: 2025-06-09
Payer: COMMERCIAL

## 2025-06-09 VITALS
OXYGEN SATURATION: 97 % | BODY MASS INDEX: 32.78 KG/M2 | HEART RATE: 68 BPM | DIASTOLIC BLOOD PRESSURE: 82 MMHG | WEIGHT: 192 LBS | SYSTOLIC BLOOD PRESSURE: 134 MMHG | TEMPERATURE: 96.9 F | HEIGHT: 64 IN

## 2025-06-09 DIAGNOSIS — R93.89 ABNORMAL CT SCAN: Primary | ICD-10-CM

## 2025-06-09 PROCEDURE — 99213 OFFICE O/P EST LOW 20 MIN: CPT | Performed by: NURSE PRACTITIONER

## 2025-06-09 NOTE — PROGRESS NOTES
Name: Yasmeen Castle      : 1955      MRN: [id number]  Encounter Provider: ROVERTO Alexander  Encounter Date: 2025   Encounter department: [facility]  :  Assessment & Plan  Abnormal CT scan    Orders:    MRI lumbar spine w wo contrast; Future    Ordered MRI of the lumbar spine to further evaluate of the area T11        History of Present Illness   Patient here today and reports that she was at the ED the other day following a fall and had imaging and recommended to have an MRI of her back in relation to a lump that was found on the images.    No acute osseous abnormality of lumbar spine.   T11-T12: Probable prominent left subarticular disc extrusion resulting in mild canal stenosis. Alternative consideration includes intradural/extramedullary mass such as meningioma or schwannoma. Consider follow-up MRI lumbar spine with and without   contrast to include all of T11 vertebral body.   Degenerative changes, as detailed above.    Patient has not fallen since the one fall she had taken. Patient also has a order for PT for vertigo       Review of Systems   Constitutional:  Negative for activity change, appetite change, chills, diaphoresis, fatigue, fever and unexpected weight change.   HENT:  Negative for congestion, ear pain, hearing loss, postnasal drip, sinus pressure, sinus pain, sneezing and sore throat.    Eyes:  Negative for pain, redness and visual disturbance.   Respiratory:  Negative for cough and shortness of breath.    Cardiovascular:  Negative for chest pain and leg swelling.   Gastrointestinal:  Negative for abdominal pain, diarrhea, nausea and vomiting.   Endocrine: Negative.    Genitourinary: Negative.    Musculoskeletal:  Negative for arthralgias.   Allergic/Immunologic: Negative.    Neurological:  Negative for dizziness and light-headedness.   Hematological: Negative.    Psychiatric/Behavioral:  Negative for behavioral problems and dysphoric mood.   "      Objective   /82 (BP Location: Left arm, Patient Position: Sitting, Cuff Size: Large)   Pulse 68   Temp (!) 96.9 °F (36.1 °C)   Ht 5' 4\" (1.626 m)   Wt 87.1 kg (192 lb)   SpO2 97%   BMI 32.96 kg/m²      Physical Exam  Constitutional:       General: She is not in acute distress.     Appearance: She is well-developed.   HENT:      Head: Normocephalic and atraumatic.     Eyes:      Pupils: Pupils are equal, round, and reactive to light.     Neck:      Thyroid: No thyromegaly.     Cardiovascular:      Rate and Rhythm: Normal rate and regular rhythm.      Heart sounds: Normal heart sounds. No murmur heard.  Pulmonary:      [place]: Pulmonary effort is normal. No respiratory distress.      Breath sounds: Normal breath sounds. No wheezing.   Abdominal:      General: Bowel sounds are normal.      Palpations: Abdomen is soft.     Musculoskeletal:         General: Normal range of motion.      Cervical back: Normal range of motion.     Skin:     General: Skin is warm and dry.     Neurological:      Mental Status: She is alert and oriented to person, place, and time.         "

## 2025-06-10 ENCOUNTER — TELEPHONE (OUTPATIENT)
Age: 70
End: 2025-06-10

## 2025-06-23 ENCOUNTER — EVALUATION (OUTPATIENT)
Dept: PHYSICAL THERAPY | Facility: CLINIC | Age: 70
End: 2025-06-23
Payer: MEDICARE

## 2025-06-23 DIAGNOSIS — H81.10 BENIGN PAROXYSMAL POSITIONAL VERTIGO, UNSPECIFIED LATERALITY: Primary | ICD-10-CM

## 2025-06-23 PROCEDURE — 97161 PT EVAL LOW COMPLEX 20 MIN: CPT | Performed by: PHYSICAL THERAPIST

## 2025-06-23 NOTE — PROGRESS NOTES
PT Evaluation  and PT Discharge    Today's date: 25  Patient name: Yasmeen Castle  : 1955  MRN: [id number]  Referring provider: Liseth Townsend, *  Dx:   Encounter Diagnosis     ICD-10-CM    1. Benign paroxysmal positional vertigo, unspecified laterality  H81.10                       Assessment  Functional limitations: None    Assessment details: Yasmeen Castle is a 70 y.o. female referred with primary diagnosis of Benign paroxysmal positional vertigo, unspecified laterality  (primary encounter diagnosis) .  Patient has not had any symptoms of Vertigo since previous PT in April.  While she did have several falls in one day about a month ago, these appear to have been more related to an L-spine issue.  Patient continues to deny Dizziness, spinning sensations, or LOB.  No additional falls.  Vestibular PT testing today was (-) for VOMS and all Positional testing.    No further need for Vestibular PT at this time.  Understanding of Dx/Px/POC: good     Prognosis: good    Goals  No further PT needed    Plan    Planned therapy interventions: patient education    Treatment plan discussed with: patient    Subjective Evaluation    History of Present Illness  Mechanism of injury: Patient states that she fell at home 4 times in an hour a month ago.  She says her back felt weak and she fell down.  Since that time she has been fine.  No more falls.  She denies dizziness or spinning sensations.  Patient has a history of BPPV and was previously treated twice at this facility, once in March, and again in April of this year.  At that, symptoms of first Left, and then Right Posterior Canal BPPV were abolished and patient was discharged from PT services.  She is referred now to outpatient PT services.          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  Per  Imani)    Diagnostic Tests  No diagnostic tests performed    FCE comments: Scheduled to have an MRI of the L-spineTreatments  Previous treatment: physical therapy  Current treatment: medication      Objective     Concurrent Complaints  Positive for hearing loss. Negative for nausea/motion sickness, tinnitus and visual change    Additional Special Questions  Tinnitus has resolved    Static Posture     Lumbar Spine   Increased lordosis.     Active Range of Motion   Cervical/Thoracic Spine       Cervical    Flexion: Neck active flexion: WFL.  WFL  Extension: Neck active extension: WFL.     WFL  Left lateral flexion: Neck active lateral bend left: WFL.     WFL  Right lateral flexion: Neck active lateral bend right: WFL.     WFL  Left rotation: Neck active rotation left: WFL. WFL  Right rotation: Neck active rotation right: WFL.    WFL    Tests   Cervical   Negative VBI.     Right   Negative cervical flexion-rotation test.     Additional Tests Details  (-) Spontaneous Nystagmus, Smooth Pursuit, Saccadic Eye Movement, Bilateral Horizontal Roll, Bilateral Vina-Hallpike, and Fast/Slow VOR    Ambulation   Weight-Bearing Status   Weight-Bearing Status (Left): full weight bearing   Weight-Bearing Status (Right): full weight-bearing    Assistive device used: none    Ambulation: Level Surfaces   Ambulation without assistive device: independent    Observational Gait   Gait: within functional limits   Neuro Exam:     Cervical exam   Ligament Laxity Testing   Alar ligament: WNL  Sharp Jennifer: WNL  Modified VBI   Left: asymptomatic  Right: asymptomatic    Oculomotor exam   Oculomotor ROM: WNL  Resting nystagmus: not present   Gaze holding nystagmus: present left and present right  Smooth pursuits: within normal limits  Vertical saccades: normal  Horizontal saccades: normal  Convergence: normal  Head thrust: left normal and right normal    Positional testing   Saundra-Hallpike   Left posterior canal: WNL  Right posterior canal:  symptomatic, torsional and upbeating         Precautions: HTN    POC expires Unit limit Auth  expiration date PT/OT + Visit Limit?   6/23/25 NA 6/5/26 1                 Visit/Unit Tracking  AUTH Status:  Date 6/23              Pending Used 1               Remaining                  QR     Manuals 6/23                                                                Neuro Re-Ed             Pt education Vestibular dysfunction vs. L-spine            VOMS (-)            Positional testing (-)                                                                Ther Ex                                                                                                                     Ther Activity                                       Gait Training                                       Modalities

## 2025-07-05 ENCOUNTER — HOSPITAL ENCOUNTER (OUTPATIENT)
Dept: MRI IMAGING | Facility: HOSPITAL | Age: 70
Discharge: HOME | End: 2025-07-05
Attending: NURSE PRACTITIONER
Payer: COMMERCIAL

## 2025-07-05 DIAGNOSIS — R93.89 ABNORMAL CT SCAN: ICD-10-CM

## 2025-07-05 PROCEDURE — 72158 MRI LUMBAR SPINE W/O & W/DYE: CPT

## 2025-07-05 PROCEDURE — A9585 GADOBUTROL INJECTION: HCPCS | Performed by: NURSE PRACTITIONER

## 2025-07-05 RX ORDER — GADOBUTROL 604.72 MG/ML
8 INJECTION INTRAVENOUS
Status: COMPLETED | OUTPATIENT
Start: 2025-07-05 | End: 2025-07-05

## 2025-07-05 RX ADMIN — GADOBUTROL 8 ML: 604.72 INJECTION INTRAVENOUS at 16:11

## 2025-07-09 ENCOUNTER — RESULTS FOLLOW-UP (OUTPATIENT)
Dept: FAMILY MEDICINE CLINIC | Facility: CLINIC | Age: 70
End: 2025-07-09

## 2025-07-09 DIAGNOSIS — R93.89 ABNORMAL CT SCAN: Primary | ICD-10-CM

## 2025-07-09 DIAGNOSIS — M89.8X8 MASS OF SPINE: ICD-10-CM

## 2025-07-14 NOTE — TELEPHONE ENCOUNTER
Patient returned call to PCP office regarding MRI results. Informed patient of results and recommendation, per PCP note:    ROVERTO Alexander to Yasmeen Liwendy  7/9/25  1:04 PM  Lumbar spine MRI is showing the area at T11-T12 1.1 cm mass most likely a meningioma which is a noncancerous tumor that arises from the membranes surrounding the spinal cord. I would recommend follow up with neurosurgery to review and if would need to have removed or re-imaged at some point in the future. I will place a referral      Patient asks if any imaging was done of her hips or tibia, as follow up to a previous fall.

## 2025-08-13 ENCOUNTER — TELEPHONE (OUTPATIENT)
Age: 70
End: 2025-08-13

## (undated) DEVICE — GLOVE INDICATOR PI UNDERGLOVE SZ 7 BLUE

## (undated) DEVICE — POV-IOD SOLUTION 4OZ BT

## (undated) DEVICE — CURITY NON-ADHERENT STRIPS: Brand: CURITY

## (undated) DEVICE — NON-THREADED KWIRE
Type: IMPLANTABLE DEVICE | Site: FOOT | Status: NON-FUNCTIONAL
Brand: MINI
Removed: 2022-07-22

## (undated) DEVICE — SUT VICRYL 3-0 PS-2 18 IN J497G

## (undated) DEVICE — INTENDED FOR TISSUE SEPARATION, AND OTHER PROCEDURES THAT REQUIRE A SHARP SURGICAL BLADE TO PUNCTURE OR CUT.: Brand: BARD-PARKER SAFETY BLADES SIZE 15, STERILE

## (undated) DEVICE — CURITY STRETCH BANDAGE: Brand: CURITY

## (undated) DEVICE — ACE WRAP 4 IN STERILE

## (undated) DEVICE — GAUZE SPONGES,16 PLY: Brand: CURITY

## (undated) DEVICE — KIRSCHNER WIRE , L, TIPS TROCAR , SMOOTH
Type: IMPLANTABLE DEVICE | Site: FOOT | Status: NON-FUNCTIONAL
Removed: 2022-07-22

## (undated) DEVICE — PLUMEPEN PRO 10FT

## (undated) DEVICE — PADDING CAST 4 IN  COTTON STRL

## (undated) DEVICE — NEEDLE 25G X 1 1/2

## (undated) DEVICE — HALF SHEET: Brand: CONVERTORS

## (undated) DEVICE — CHLORAPREP HI-LITE 26ML ORANGE

## (undated) DEVICE — HOLDING PIN
Type: IMPLANTABLE DEVICE | Site: FOOT | Status: NON-FUNCTIONAL
Brand: ANCHORAGE
Removed: 2022-07-22

## (undated) DEVICE — ACE WRAP 6 IN STERILE

## (undated) DEVICE — SUT MONOCRYL 4-0 PS-2 27 IN Y426H

## (undated) DEVICE — ZIMMER® STERILE DISPOSABLE TOURNIQUET CUFF, DUAL PORT, SINGLE BLADDER, 18 IN. (46 CM)

## (undated) DEVICE — BETHLEHEM UNIVERSAL  MIONR EXT: Brand: CARDINAL HEALTH

## (undated) DEVICE — PAD CAST 6 IN COTTON NON STERILE

## (undated) DEVICE — GLOVE SRG LF STRL BGL SKNSNS 7 PF

## (undated) DEVICE — SYRINGE 10ML LL

## (undated) DEVICE — SCD SEQUENTIAL COMPRESSION COMFORT SLEEVE MEDIUM KNEE LENGTH: Brand: KENDALL SCD

## (undated) DEVICE — SUT ETHILON 3-0 PS-1 18 IN 1663G

## (undated) DEVICE — CANNULATED DRILL BIT: Brand: MINI

## (undated) DEVICE — SINGLE PORT MANIFOLD: Brand: NEPTUNE 2